# Patient Record
Sex: MALE | Race: WHITE | Employment: OTHER | URBAN - METROPOLITAN AREA
[De-identification: names, ages, dates, MRNs, and addresses within clinical notes are randomized per-mention and may not be internally consistent; named-entity substitution may affect disease eponyms.]

---

## 2017-11-06 ENCOUNTER — HOSPITAL ENCOUNTER (EMERGENCY)
Age: 67
Discharge: HOME | End: 2017-11-06
Attending: EMERGENCY MEDICINE
Payer: MEDICARE

## 2017-11-06 ENCOUNTER — APPOINTMENT (OUTPATIENT)
Dept: GENERAL RADIOLOGY | Age: 67
End: 2017-11-06
Attending: EMERGENCY MEDICINE
Payer: MEDICARE

## 2017-11-06 VITALS
OXYGEN SATURATION: 99 % | SYSTOLIC BLOOD PRESSURE: 155 MMHG | HEART RATE: 70 BPM | RESPIRATION RATE: 12 BRPM | BODY MASS INDEX: 22.19 KG/M2 | DIASTOLIC BLOOD PRESSURE: 63 MMHG | TEMPERATURE: 98.4 F | WEIGHT: 155 LBS | HEIGHT: 70 IN

## 2017-11-06 DIAGNOSIS — M79.605 LEG PAIN, LEFT: ICD-10-CM

## 2017-11-06 DIAGNOSIS — R53.1 LEFT-SIDED WEAKNESS: ICD-10-CM

## 2017-11-06 DIAGNOSIS — R07.9 CHEST PAIN, UNSPECIFIED TYPE: Primary | ICD-10-CM

## 2017-11-06 LAB
ALBUMIN SERPL-MCNC: 2.6 G/DL (ref 3.4–5)
ALBUMIN/GLOB SERPL: 0.8 {RATIO} (ref 0.8–1.7)
ALP SERPL-CCNC: 74 U/L (ref 45–117)
ALT SERPL-CCNC: 11 U/L (ref 16–61)
ANION GAP SERPL CALC-SCNC: 8 MMOL/L (ref 3–18)
AST SERPL-CCNC: 9 U/L (ref 15–37)
ATRIAL RATE: 74 BPM
BASOPHILS # BLD: 0 K/UL (ref 0–0.06)
BASOPHILS NFR BLD: 0 % (ref 0–2)
BILIRUB SERPL-MCNC: 0.3 MG/DL (ref 0.2–1)
BNP SERPL-MCNC: 5383 PG/ML (ref 0–900)
BUN SERPL-MCNC: 28 MG/DL (ref 7–18)
BUN/CREAT SERPL: 17 (ref 12–20)
CALCIUM SERPL-MCNC: 7.8 MG/DL (ref 8.5–10.1)
CALCULATED P AXIS, ECG09: 107 DEGREES
CALCULATED R AXIS, ECG10: 31 DEGREES
CALCULATED T AXIS, ECG11: 52 DEGREES
CHLORIDE SERPL-SCNC: 108 MMOL/L (ref 100–108)
CK MB CFR SERPL CALC: NORMAL % (ref 0–4)
CK MB SERPL-MCNC: <1 NG/ML (ref 5–25)
CK SERPL-CCNC: 47 U/L (ref 39–308)
CO2 SERPL-SCNC: 26 MMOL/L (ref 21–32)
CREAT SERPL-MCNC: 1.62 MG/DL (ref 0.6–1.3)
DIAGNOSIS, 93000: NORMAL
DIFFERENTIAL METHOD BLD: ABNORMAL
EOSINOPHIL # BLD: 0.2 K/UL (ref 0–0.4)
EOSINOPHIL NFR BLD: 1 % (ref 0–5)
ERYTHROCYTE [DISTWIDTH] IN BLOOD BY AUTOMATED COUNT: 14.1 % (ref 11.6–14.5)
GLOBULIN SER CALC-MCNC: 3.2 G/DL (ref 2–4)
GLUCOSE SERPL-MCNC: 145 MG/DL (ref 74–99)
HCT VFR BLD AUTO: 28.9 % (ref 36–48)
HGB BLD-MCNC: 9.5 G/DL (ref 13–16)
LYMPHOCYTES # BLD: 1.6 K/UL (ref 0.9–3.6)
LYMPHOCYTES NFR BLD: 10 % (ref 21–52)
MCH RBC QN AUTO: 29.8 PG (ref 24–34)
MCHC RBC AUTO-ENTMCNC: 32.9 G/DL (ref 31–37)
MCV RBC AUTO: 90.6 FL (ref 74–97)
MONOCYTES # BLD: 0.8 K/UL (ref 0.05–1.2)
MONOCYTES NFR BLD: 5 % (ref 3–10)
NEUTS SEG # BLD: 12.6 K/UL (ref 1.8–8)
NEUTS SEG NFR BLD: 84 % (ref 40–73)
P-R INTERVAL, ECG05: 154 MS
PLATELET # BLD AUTO: 316 K/UL (ref 135–420)
PMV BLD AUTO: 9.2 FL (ref 9.2–11.8)
POTASSIUM SERPL-SCNC: 4.3 MMOL/L (ref 3.5–5.5)
PROT SERPL-MCNC: 5.8 G/DL (ref 6.4–8.2)
Q-T INTERVAL, ECG07: 410 MS
QRS DURATION, ECG06: 84 MS
QTC CALCULATION (BEZET), ECG08: 455 MS
RBC # BLD AUTO: 3.19 M/UL (ref 4.7–5.5)
SODIUM SERPL-SCNC: 142 MMOL/L (ref 136–145)
TROPONIN I BLD-MCNC: 0.07 NG/ML (ref 0–0.08)
TROPONIN I SERPL-MCNC: 0.02 NG/ML (ref 0–0.04)
VENTRICULAR RATE, ECG03: 74 BPM
WBC # BLD AUTO: 15.1 K/UL (ref 4.6–13.2)

## 2017-11-06 PROCEDURE — 80053 COMPREHEN METABOLIC PANEL: CPT | Performed by: EMERGENCY MEDICINE

## 2017-11-06 PROCEDURE — 96374 THER/PROPH/DIAG INJ IV PUSH: CPT

## 2017-11-06 PROCEDURE — 93005 ELECTROCARDIOGRAM TRACING: CPT

## 2017-11-06 PROCEDURE — 74011250636 HC RX REV CODE- 250/636: Performed by: EMERGENCY MEDICINE

## 2017-11-06 PROCEDURE — 71010 XR CHEST PORT: CPT

## 2017-11-06 PROCEDURE — 85025 COMPLETE CBC W/AUTO DIFF WBC: CPT | Performed by: EMERGENCY MEDICINE

## 2017-11-06 PROCEDURE — 84484 ASSAY OF TROPONIN QUANT: CPT | Performed by: EMERGENCY MEDICINE

## 2017-11-06 PROCEDURE — 82550 ASSAY OF CK (CPK): CPT | Performed by: EMERGENCY MEDICINE

## 2017-11-06 PROCEDURE — 83880 ASSAY OF NATRIURETIC PEPTIDE: CPT | Performed by: EMERGENCY MEDICINE

## 2017-11-06 PROCEDURE — 99285 EMERGENCY DEPT VISIT HI MDM: CPT

## 2017-11-06 RX ORDER — FLUTICASONE FUROATE AND VILANTEROL 200; 25 UG/1; UG/1
1 POWDER RESPIRATORY (INHALATION) DAILY
COMMUNITY

## 2017-11-06 RX ORDER — AMMONIUM LACTATE 12 G/100G
LOTION TOPICAL AS NEEDED
COMMUNITY

## 2017-11-06 RX ORDER — NALOXONE HYDROCHLORIDE 4 MG/.1ML
1 SPRAY NASAL
COMMUNITY

## 2017-11-06 RX ORDER — FENTANYL 12.5 UG/1
1 PATCH TRANSDERMAL
COMMUNITY
End: 2017-11-27

## 2017-11-06 RX ORDER — ADHESIVE BANDAGE
30 BANDAGE TOPICAL DAILY PRN
COMMUNITY

## 2017-11-06 RX ORDER — INSULIN ASPART 100 [IU]/ML
INJECTION, SUSPENSION SUBCUTANEOUS
COMMUNITY

## 2017-11-06 RX ORDER — NYSTATIN 100000 U/G
OINTMENT TOPICAL 2 TIMES DAILY
COMMUNITY

## 2017-11-06 RX ORDER — NITROGLYCERIN 0.4 MG/1
0.4 TABLET SUBLINGUAL
COMMUNITY

## 2017-11-06 RX ORDER — CLOPIDOGREL BISULFATE 75 MG/1
75 TABLET ORAL
COMMUNITY

## 2017-11-06 RX ORDER — GUAIFENESIN 600 MG/1
600 TABLET, EXTENDED RELEASE ORAL 2 TIMES DAILY
COMMUNITY

## 2017-11-06 RX ORDER — ATORVASTATIN CALCIUM 80 MG/1
80 TABLET, FILM COATED ORAL DAILY
COMMUNITY

## 2017-11-06 RX ORDER — SENNOSIDES 8.6 MG/1
1 TABLET ORAL DAILY
COMMUNITY

## 2017-11-06 RX ORDER — GABAPENTIN 400 MG/1
400 CAPSULE ORAL 3 TIMES DAILY
COMMUNITY

## 2017-11-06 RX ORDER — HYDROMORPHONE HYDROCHLORIDE 1 MG/ML
1 INJECTION, SOLUTION INTRAMUSCULAR; INTRAVENOUS; SUBCUTANEOUS ONCE
Status: COMPLETED | OUTPATIENT
Start: 2017-11-06 | End: 2017-11-06

## 2017-11-06 RX ORDER — ESCITALOPRAM OXALATE 10 MG/1
10 TABLET ORAL DAILY
COMMUNITY

## 2017-11-06 RX ORDER — AMLODIPINE BESYLATE 10 MG/1
10 TABLET ORAL DAILY
COMMUNITY

## 2017-11-06 RX ORDER — FINASTERIDE 5 MG/1
5 TABLET, FILM COATED ORAL DAILY
COMMUNITY

## 2017-11-06 RX ORDER — BACITRACIN ZINC 500 UNIT/G
OINTMENT (GRAM) TOPICAL 2 TIMES DAILY
COMMUNITY

## 2017-11-06 RX ORDER — LANOLIN ALCOHOL/MO/W.PET/CERES
3 CREAM (GRAM) TOPICAL
COMMUNITY

## 2017-11-06 RX ORDER — OXYCODONE HYDROCHLORIDE 10 MG/1
10 TABLET ORAL
Status: ON HOLD | COMMUNITY
End: 2017-11-22

## 2017-11-06 RX ORDER — SODIUM BICARBONATE 650 MG/1
650 TABLET ORAL 4 TIMES DAILY
COMMUNITY

## 2017-11-06 RX ORDER — GLUCOSAMINE SULFATE 1500 MG
POWDER IN PACKET (EA) ORAL DAILY
COMMUNITY

## 2017-11-06 RX ORDER — TRAZODONE HYDROCHLORIDE 50 MG/1
50 TABLET ORAL
COMMUNITY

## 2017-11-06 RX ORDER — ONDANSETRON 8 MG/1
8 TABLET, ORALLY DISINTEGRATING ORAL
COMMUNITY

## 2017-11-06 RX ADMIN — HYDROMORPHONE HYDROCHLORIDE 1 MG: 1 INJECTION, SOLUTION INTRAMUSCULAR; INTRAVENOUS; SUBCUTANEOUS at 08:35

## 2017-11-06 NOTE — DISCHARGE INSTRUCTIONS
Chest Pain: Care Instructions  Your Care Instructions    There are many things that can cause chest pain. Some are not serious and will get better on their own in a few days. But some kinds of chest pain need more testing and treatment. Your doctor may have recommended a follow-up visit in the next 8 to 12 hours. If you are not getting better, you may need more tests or treatment. Even though your doctor has released you, you still need to watch for any problems. The doctor carefully checked you, but sometimes problems can develop later. If you have new symptoms or if your symptoms do not get better, get medical care right away. If you have worse or different chest pain or pressure that lasts more than 5 minutes or you passed out (lost consciousness), call 911 or seek other emergency help right away. A medical visit is only one step in your treatment. Even if you feel better, you still need to do what your doctor recommends, such as going to all suggested follow-up appointments and taking medicines exactly as directed. This will help you recover and help prevent future problems. How can you care for yourself at home? · Rest until you feel better. · Take your medicine exactly as prescribed. Call your doctor if you think you are having a problem with your medicine. · Do not drive after taking a prescription pain medicine. When should you call for help? Call 911 if:  ? · You passed out (lost consciousness). ? · You have severe difficulty breathing. ? · You have symptoms of a heart attack. These may include:  ¨ Chest pain or pressure, or a strange feeling in your chest.  ¨ Sweating. ¨ Shortness of breath. ¨ Nausea or vomiting. ¨ Pain, pressure, or a strange feeling in your back, neck, jaw, or upper belly or in one or both shoulders or arms. ¨ Lightheadedness or sudden weakness. ¨ A fast or irregular heartbeat.   After you call 911, the  may tell you to chew 1 adult-strength or 2 to 4 low-dose aspirin. Wait for an ambulance. Do not try to drive yourself. ?Call your doctor today if:  ? · You have any trouble breathing. ? · Your chest pain gets worse. ? · You are dizzy or lightheaded, or you feel like you may faint. ? · You are not getting better as expected. ? · You are having new or different chest pain. Where can you learn more? Go to http://lani-caleb.info/. Enter A120 in the search box to learn more about \"Chest Pain: Care Instructions. \"  Current as of: March 20, 2017  Content Version: 11.4  © 1648-8677 [employer]. Care instructions adapted un       Fatigue: Care Instructions  Your Care Instructions    Fatigue is a feeling of tiredness, exhaustion, or lack of energy. You may feel fatigue because of too much or not enough activity. It can also come from stress, lack of sleep, boredom, and poor diet. Many medical problems, such as viral infections, can cause fatigue. Emotional problems, especially depression, are often the cause of fatigue. Fatigue is most often a symptom of another problem. Treatment for fatigue depends on the cause. For example, if you have fatigue because you have a certain health problem, treating this problem also treats your fatigue. If depression or anxiety is the cause, treatment may help. Follow-up care is a key part of your treatment and safety. Be sure to make and go to all appointments, and call your doctor if you are having problems. It's also a good idea to know your test results and keep a list of the medicines you take. How can you care for yourself at home? · Get regular exercise. But don't overdo it. Go back and forth between rest and exercise. · Get plenty of rest.  · Eat a healthy diet. Do not skip meals, especially breakfast.  · Reduce your use of caffeine, tobacco, and alcohol. Caffeine is most often found in coffee, tea, cola drinks, and chocolate. · Limit medicines that can cause fatigue.  This includes tranquilizers and cold and allergy medicines. When should you call for help? Watch closely for changes in your health, and be sure to contact your doctor if:  ? · You have new symptoms such as fever or a rash. ? · Your fatigue gets worse. ? · You have been feeling down, depressed, or hopeless. Or you may have lost interest in things that you usually enjoy. ? · You are not getting better as expected. Where can you learn more? Go to http://lani-caleb.info/. Enter J855 in the search box to learn more about \"Fatigue: Care Instructions. \"  Current as of: March 20, 2017  Content Version: 11.4  © 8658-2653 [employer]. Care instructions adapted under license by [employer] (which disclaims liability or warranty for this information). If you have questions about a medical condition or this instruction, always ask your healthcare professional. [place] [address] any warranty or liability for your use of this information. Weakness: Care Instructions  Your Care Instructions    Weakness is a lack of physical or muscle strength. You may feel that you need to make extra effort to move your arms, legs, or other muscles. Generalized weakness means that you feel weak in most areas of your body. Another type of weakness may affect just one muscle or group of muscles. You may feel weak and tired after you have done too much activity, such as taking an extra-long hike. This is not a serious problem. It often goes away on its own. Feeling weak can also be caused by medical conditions like thyroid problems, depression, or a virus. Sometimes the cause can be serious. Your doctor may want to do more tests to try to find the cause of the weakness. The doctor has checked you carefully, but problems can develop later. If you notice any problems or new symptoms, get medical treatment right away.   Follow-up care is a key part of your treatment and safety. Be sure to make and go to all appointments, and call your doctor if you are having problems. It's also a good idea to know your test results and keep a list of the medicines you take. How can you care for yourself at home? · Rest when you feel tired. · Be safe with medicines. If your doctor prescribed medicine, take it exactly as prescribed. Call your doctor if you think you are having a problem with your medicine. You will get more details on the specific medicines your doctor prescribes. · Do not skip meals. Eating a balanced diet may increase your energy level. · Get some physical activity every day, but do not get too tired. When should you call for help? Call your doctor now or seek immediate medical care if:  ? · You have new or worse weakness. ? · You are dizzy or lightheaded, or you feel like you may faint. ? Watch closely for changes in your health, and be sure to contact your doctor if:  ? · You do not get better as expected. Where can you learn more? Go to http://lani-caleb.info/. Enter 633 [id number] [id number] in the search box to learn more about \"Weakness: Care Instructions. \"  Current as of: March 20, 2017  Content Version: 11.4  © 4307-6435 [employer], [employer]. Care instructions adapted under license by [employer] (which disclaims liability or warranty for this information). If you have questions about a medical condition or this instruction, always ask your healthcare professional. [address] any warranty or liability for your use of this information. huey license by [address] [employer] (which disclaims liability or warranty for this information). If you have questions about a medical condition or this instruction, always ask your healthcare professional. [address] any warranty or liability for your use of this information.

## 2017-11-06 NOTE — ED NOTES
Patient requesting to be taken outside to sit in the sun, explained to the patient that as he arrived for chest pain, I cannot take him off the monitor and have hime sit outside, patient refusing to have any further tests, patient wants to be taken home. Provider in the room to explain the tests to the patient. Katie Davis at [facility] was called to inform her of patient status.

## 2017-11-06 NOTE — ED NOTES
Patient cleaned and a new Depends placed on the patient (patient requested change as his Depends was wet), patient given a pillow and warm blanket

## 2017-11-06 NOTE — ED NOTES
Patient cleaned of stool and urine, placed into a wheel chair per patient's request. Placed socks on patient per request.

## 2017-11-06 NOTE — ED NOTES
Patient was discharged roughly two hours ago by another nurse. No one charted time. This nurse discharged patient from system to clear board.

## 2017-11-06 NOTE — ED PROVIDER NOTES
HPI Comments: 8:33 AM  Nicole Leung is a 78 y.o. male with a h/o MI, HTN, DM, CHF, CAD, and stroke presents to ED, via EMS, c/o left-sided CP onset 2 weeks ago. Pain is 9/10 in intensity, intermittent, radiates to left shoulder, and lasts for 3 minutes each episode. Associated symptoms include SOB. Patient reports that he has a history of seven MI, DVT, PE, and a stroke 1 year ago. He states that the stroke caused left-sided weakness and chronic left leg pain since 1 year ago. Patient states that he was recently admitted to [facility] but did not have an angiogram or cardiac catheterization done, leaving before having a complete workup. He notes that he is usually taken care of at a nursing home. Patient's exertional symptoms are unclear due to him being wheelchair bound. He states that he takes blood thinner Coumadin and would like pain medication for his chronic left leg, which he states that the pain medications at the nursing home does not improve this pain. Patient denies any other symptoms or complaints. PCP: Ashlee Kelley MD      The history is provided by the patient. No  was used. Past Medical History:   Diagnosis Date    CAD (coronary artery disease)     [facility]     Diabetes ([facility]     Hypertension     Ill-defined condition     hip pain/infection    MI, old     Stroke (cerebrum) ([facility]        Past Surgical History:   Procedure Laterality Date    HX APPENDECTOMY  13         Family History:   Problem Relation Age of Onset    Cancer Father      Pancreatic       Social History     Social History    Marital status: SINGLE     Spouse name: N/A    Number of children: N/A    Years of education: N/A     Occupational History    Not on file.      Social History Main Topics    Smoking status: Current Every Day Smoker     Packs/day: 1.00    Smokeless tobacco: Not on file    Alcohol use 3.6 oz/week     6 Cans of beer per week    Drug use: Not on file    Sexual activity: Not on file     Other Topics Concern    Not on file     Social History Narrative         ALLERGIES: Biaxin [clarithromycin]    Review of Systems   Constitutional: Negative for activity change, fatigue and fever. HENT: Negative for congestion and rhinorrhea. Eyes: Negative for visual disturbance. Respiratory: Positive for shortness of breath. Cardiovascular: Positive for chest pain (left sided). Negative for palpitations. Gastrointestinal: Negative for abdominal pain, diarrhea, nausea and vomiting. Genitourinary: Negative for dysuria and hematuria. Musculoskeletal: Negative for back pain. Hip pain. Skin: Negative for rash. Neurological: Positive for weakness (left sude). Negative for dizziness and light-headedness. Psychiatric/Behavioral: Negative for agitation. All other systems reviewed and are negative. Vitals:    11/06/17 1150 11/06/17 1200 11/06/17 1230 11/06/17 1240   BP: 151/66 137/57 137/73 155/63   Pulse: 68 70 71 70   Resp: 13 17 16 12   Temp:       SpO2: 98% 99%     Weight:       Height:                Physical Exam   Constitutional: He appears well-developed and well-nourished. HENT:   Head: Normocephalic and atraumatic. Eyes: Conjunctivae are normal. Pupils are equal, round, and reactive to light. Neck: Normal range of motion. Neck supple. Cardiovascular: Normal rate and regular rhythm. Pulmonary/Chest: Effort normal and breath sounds normal.   Abdominal: Soft. Bowel sounds are normal.   Right to midline abdomen hernia appreciated, reducible and non-tender. Musculoskeletal: Normal range of motion. Lymphadenopathy:     He has no cervical adenopathy. Neurological: He is alert. Skin: Skin is warm. No inguinal or perineal rash. Right foot dorsum stage 3 ulcer with mild erythema. Large eschar to the right heel. Psychiatric: He has a normal mood and affect.         MDM  Number of Diagnoses or Management Options  Diagnosis management comments: 78 y.o. Male presents to ED with CP. He has a history of peripheral vascular disease, stroke, and recent admission to [facility] with an incomplete workup. Patient has left leg pain from stroke, a history of DVT and PE, and right foot ulcer. I will rule out CAD and do a chest x-ray for pneumonia. Patient states that chronic pain is the main reason he is here. He would like pain medication for his leg.     ED Course       Procedures    Vitals:  Patient Vitals for the past 12 hrs:   Temp Pulse Resp BP SpO2   11/06/17 1240 - 70 12 155/63 -   11/06/17 1230 - 71 16 137/73 -   11/06/17 1200 - 70 17 137/57 99 %   11/06/17 1150 - 68 13 151/66 98 %   11/06/17 1140 - 72 15 (!) 118/94 -   11/06/17 1130 - 67 15 177/65 -   11/06/17 1120 - 66 14 157/70 -   11/06/17 1110 - 68 17 134/56 99 %   11/06/17 1100 - 64 14 166/62 100 %   11/06/17 1050 - 64 11 173/61 99 %   11/06/17 1040 - 65 9 159/62 100 %   11/06/17 1030 - 65 13 172/62 99 %   11/06/17 1020 - 65 13 167/59 100 %   11/06/17 1010 - 66 14 168/59 99 %   11/06/17 1000 - 64 13 161/57 99 %   11/06/17 0950 - 65 14 159/57 98 %   11/06/17 0930 - 65 13 166/57 100 %   11/06/17 0900 - 66 18 137/59 100 %   11/06/17 0830 - 69 17 159/61 100 %   11/06/17 0800 - 74 21 161/61 99 %   11/06/17 0750 98.4 °F (36.9 °C) 69 17 159/61 -       Medications Ordered:  Medications   HYDROmorphone (PF) (DILAUDID) injection 1 mg (1 mg IntraVENous Given 11/6/17 0835)       Lab Findings:  Recent Results (from the past 12 hour(s))   EKG, 12 LEAD, INITIAL    Collection Time: 11/06/17  7:38 AM   Result Value Ref Range    Ventricular Rate 74 BPM    Atrial Rate 74 BPM    P-R Interval 154 ms    QRS Duration 84 ms    Q-T Interval 410 ms    QTC Calculation (Bezet) 455 ms    Calculated P Axis 107 degrees    Calculated R Axis 31 degrees    Calculated T Axis 52 degrees    Diagnosis       Sinus rhythm with premature atrial complexes  Otherwise normal ECG  When compared with ECG of 14-MAY-2016 09:00,  premature atrial complexes are now present  Confirmed by Jenny Reich ([id number]) on 11/6/2017 9:07:24 AM     CBC WITH AUTOMATED DIFF    Collection Time: 11/06/17  8:00 AM   Result Value Ref Range    WBC 15.1 (H) 4.6 - 13.2 K/uL    RBC 3.19 (L) 4.70 - 5.50 M/uL    HGB 9.5 (L) 13.0 - 16.0 g/dL    HCT 28.9 (L) 36.0 - 48.0 %    MCV 90.6 74.0 - 97.0 FL    MCH 29.8 24.0 - 34.0 PG    MCHC 32.9 31.0 - 37.0 g/dL    RDW 14.1 11.6 - 14.5 %    PLATELET 837 303 - 674 K/uL    MPV 9.2 9.2 - 11.8 FL    NEUTROPHILS 84 (H) 40 - 73 %    LYMPHOCYTES 10 (L) 21 - 52 %    MONOCYTES 5 3 - 10 %    EOSINOPHILS 1 0 - 5 %    BASOPHILS 0 0 - 2 %    ABS. NEUTROPHILS 12.6 (H) 1.8 - 8.0 K/UL    ABS. LYMPHOCYTES 1.6 0.9 - 3.6 K/UL    ABS. MONOCYTES 0.8 0.05 - 1.2 K/UL    ABS. EOSINOPHILS 0.2 0.0 - 0.4 K/UL    ABS. BASOPHILS 0.0 0.0 - 0.06 K/UL    DF AUTOMATED     METABOLIC PANEL, COMPREHENSIVE    Collection Time: 11/06/17  8:00 AM   Result Value Ref Range    Sodium 142 136 - 145 mmol/L    Potassium 4.3 3.5 - 5.5 mmol/L    Chloride 108 100 - 108 mmol/L    CO2 26 21 - 32 mmol/L    Anion gap 8 3.0 - 18 mmol/L    Glucose 145 (H) 74 - 99 mg/dL    BUN 28 (H) 7.0 - 18 MG/DL    Creatinine 1.62 (H) 0.6 - 1.3 MG/DL    BUN/Creatinine ratio 17 12 - 20      GFR est AA 52 (L) >60 ml/min/1.73m2    GFR est non-AA 43 (L) >60 ml/min/1.73m2    Calcium 7.8 (L) 8.5 - 10.1 MG/DL    Bilirubin, total 0.3 0.2 - 1.0 MG/DL    ALT (SGPT) 11 (L) 16 - 61 U/L    AST (SGOT) 9 (L) 15 - 37 U/L    Alk.  phosphatase 74 45 - 117 U/L    Protein, total 5.8 (L) 6.4 - 8.2 g/dL    Albumin 2.6 (L) 3.4 - 5.0 g/dL    Globulin 3.2 2.0 - 4.0 g/dL    A-G Ratio 0.8 0.8 - 1.7     CARDIAC PANEL,(CK, CKMB & TROPONIN)    Collection Time: 11/06/17  8:00 AM   Result Value Ref Range    CK 47 39 - 308 U/L    CK - MB <1.0 <3.6 ng/ml    CK-MB Index  0.0 - 4.0 %     CALCULATION NOT PERFORMED WHEN RESULT IS BELOW LINEAR LIMIT    Troponin-I, Qt. 0.02 0.0 - 0.045 NG/ML   NT-PRO BNP    Collection Time: 11/06/17  8:00 AM   Result Value Ref Range    NT pro-BNP 5383 (H) 0 - 900 PG/ML   POC TROPONIN-I    Collection Time: 11/06/17 11:59 AM   Result Value Ref Range    Troponin-I (POC) 0.07 0.00 - 0.08 ng/mL       EKG Interpretation by ED physician:  7:38 AM  NSR, 74, normal intervals, and normal axis. No ischemic changes. X-ray, CT or radiology findings or impressions:  XR CHEST PORT   Final Result      Xr Chest Port    Result Date: 11/6/2017  Chest, single view Indication: Coronary artery disease, left-sided chest pain. Comparison: Chest radiograph May 14, 2016 Findings:  Portable upright AP view of the chest was obtained. There is no focal pneumonic opacity, pneumothorax, or pleural effusion. Cardiac size and mediastinal contours are stable. Atherosclerotic calcification of the thoracic aorta noted. No acute osseous abnormality. Impression: No radiographic evidence of an acute abnormality. Stable exam.        Progress notes, consult notes, or additional procedure notes:  8:48 AM  Patient's white count is 15,000 and hemoglobin is 9. Creatine is 1.62. These labs are abnormal and worsening compared to May of 2016.    8:50 AM  Compared to [facility] labs 3 days ago, patient's hemoglobin is 9.2. There was a request to get an aortogram but patient was unable to get that done and left before treatment was completed. 1:04 PM  Patient's case was discussed with Dr. Marry EMERSON who states patient has chronic leg pain and body pain. I did ask that patient has multiple stents and CAD hx, and she was unaware of this pathology, and I have asked them to follow up on his care. At 1300 hour patient stated that he is ready to go home. I did tell him that his 2nd tropinin was a different number because it was PO testing. Patient does not want to stay for repeat testing to make sure it was not abnormal. I am honoroing patients wishes. He is awake, alert, and oriented.  Patient will follow up in the clinic. Reevaluation of the patient:      Diagnosis:   1. Chest pain, unspecified type    2. Leg pain, left    3. Left-sided weakness        Disposition: DC home with followup / patient was done and did not want any more testing done. He was awake. alert. No distress and wanted to go out and sit in the sun. Follow-up Information     Follow up With Details Comments Contact Info    Zeeshan Lagunas MD Call today Follow Up From Emergency Department [address] [facility] EMERGENCY DEPT  If symptoms worsen [address] [facility] [address].  [phone]           Patient's Medications   Start Taking    No medications on file   Continue Taking    ACETAMINOPHEN (TYLENOL) 325 MG TABLET    Take  by mouth every four (4) hours as needed for Pain. AMLODIPINE (NORVASC) 10 MG TABLET    Take 10 mg by mouth daily. AMMONIUM LACTATE (LAC-HYDRIN) 12 % LOTION    Apply  to affected area as needed. rub in to affected area well    APIXABAN (ELIQUIS) 5 MG TABLET    Take 2.5 mg by mouth two (2) times a day. ASPIRIN DELAYED-RELEASE 81 MG TABLET    Take 1 Tab by mouth daily. ATORVASTATIN (LIPITOR) 80 MG TABLET    Take 80 mg by mouth daily. BACITRACIN ZINC (BACITRACIN) OINTMENT    Apply  to affected area two (2) times a day. CHOLECALCIFEROL (VITAMIN D3) 1,000 UNIT CAP    Take  by mouth daily. CLOPIDOGREL (PLAVIX) 75 MG TAB    Take 75 mg by mouth. DIPHENHYDRAMINE-ZINC ACETATE 2%-0.1% (BENADRYL EXTRA STRENGTH) 2-0.1 % TOPICAL CREAM    Apply  to affected area two (2) times daily as needed for Itching. DULOXETINE (CYMBALTA) 60 MG CAPSULE    Take 60 mg by mouth daily. ESCITALOPRAM OXALATE (LEXAPRO) 10 MG TABLET    Take 10 mg by mouth daily. FENTANYL (DURAGESIC) 12 MCG/HR PATCH    1 Patch by TransDERmal route every seventy-two (72) hours. FINASTERIDE (PROSCAR) 5 MG TABLET    Take 5 mg by mouth daily.     FLUTICASONE-VILANTEROL (BREO ELLIPTA) 200-25 MCG/DOSE INHALER    Take 1 Puff by inhalation daily. FUROSEMIDE (LASIX) 20 MG TABLET    Take  by mouth daily. GABAPENTIN (NEURONTIN) 400 MG CAPSULE    Take 400 mg by mouth three (3) times daily. GUAIFENESIN ER (MUCINEX) 600 MG ER TABLET    Take 600 mg by mouth two (2) times a day. INSULIN ASPART PROTAMINE/INSULIN ASPART (NOVOLOG MIX 70-30 FLEXPEN) 100 UNIT/ML (70-30) INPN    by SubCUTAneous route. INSULIN GLARGINE (LANTUS) 100 UNIT/ML INJECTION    by SubCUTAneous route nightly. Per scale    INSULIN LISPRO (HUMALOG) 100 UNIT/ML INJECTION    by SubCUTAneous route once. INSULIN NPH/INSULIN REGULAR (NOVOLIN 70/30) 100 UNIT/ML (70-30) INJECTION    10 Units by SubCUTAneous route two (2) times a day. IPRATROPIUM-ALBUTEROL (COMBIVENT RESPIMAT)  MCG/ACTUATION INHALER    Take 1 Puff by inhalation two (2) times a day. LINAGLIPTIN (TRADJENTA) 5 MG TABLET    Take 5 mg by mouth daily. LOSARTAN (COZAAR) 100 MG TABLET    Take 100 mg by mouth daily. MAGNESIUM HYDROXIDE (iPAYst MILK OF MAGNESIA) 400 MG/5 ML SUSPENSION    Take 30 mL by mouth daily as needed for Constipation. MELATONIN 3 MG TABLET    Take 3 mg by mouth. MIRABEGRON ER (MYRBETRIQ) 25 MG ER TABLET    Take 1 Tab by mouth daily. NALOXONE (NARCAN) 4 MG/ACTUATION NASAL SPRAY    1 [place] by IntraNASal route once as needed. Use 1 spray intranasally into 1 nostril. Use a new Narcan nasal spray for subsequent doses and administer into alternating nostrils. May repeat every 2 to 3 minutes as needed. NITROGLYCERIN (NITROSTAT) 0.4 MG SL TABLET    0.4 mg by SubLINGual route every five (5) minutes as needed for Chest Pain. NYSTATIN (MYCOSTATIN) 100,000 UNIT/GRAM OINTMENT    Apply  to affected area two (2) times a day. OMEGA-3 FATTY ACIDS/FISH OIL (THERAGRAN-M PO)    Take  by mouth. ONDANSETRON (ZOFRAN ODT) 8 MG DISINTEGRATING TABLET    Take 8 mg by mouth every eight (8) hours as needed for Nausea.     OXYCODONE IR (Stana Luz Elena) 10 MG TAB IMMEDIATE RELEASE TABLET    Take 10 mg by mouth. OXYMETAZOLINE (AFRIN, OXYMETAZOLINE,) 0.05 % NASAL SPRAY    2 Sprays two (2) times a day. SENNA (SENNA) 8.6 MG TABLET    Take 1 Tab by mouth daily. SODIUM BICARBONATE 650 MG TABLET    Take 650 mg by mouth four (4) times daily. TAMSULOSIN (FLOMAX) 0.4 MG CAPSULE    Take 0.4 mg by mouth daily. TIOTROPIUM (SPIRIVA) 18 MCG INHALATION CAPSULE    Take 1 Cap by inhalation daily. TRAZODONE (DESYREL) 50 MG TABLET    Take 50 mg by mouth nightly. ZINC OXIDE (SECURA PROTECTIVE, ZINC OXIDE,) 10 % TOPICAL CREAM    Apply  to affected area. These Medications have changed    No medications on file   Stop Taking    No medications on file       Scribe Attestation      Talisha Benavides acting as a scribe for and in the presence of Leeann Foster MD  November 06, 2017 at 8:37 AM       Provider Attestation:      I personally performed the services described in the documentation, reviewed the documentation, as recorded by the scribe in my presence, and it accurately and completely records my words and actions.  November 06, 2017 at 8:37 AM - Leeann Foster MD

## 2017-11-17 ENCOUNTER — HOSPITAL ENCOUNTER (INPATIENT)
Age: 67
LOS: 10 days | Discharge: NURSING, REHAB OR LONG-TERM CARE | DRG: 270 | End: 2017-11-27
Attending: EMERGENCY MEDICINE | Admitting: HOSPITALIST
Payer: MEDICARE

## 2017-11-17 ENCOUNTER — APPOINTMENT (OUTPATIENT)
Dept: GENERAL RADIOLOGY | Age: 67
DRG: 270 | End: 2017-11-17
Attending: PHYSICIAN ASSISTANT
Payer: MEDICARE

## 2017-11-17 DIAGNOSIS — L89.894: Primary | ICD-10-CM

## 2017-11-17 DIAGNOSIS — E86.0 DEHYDRATION: ICD-10-CM

## 2017-11-17 DIAGNOSIS — R77.8 ELEVATED TROPONIN: ICD-10-CM

## 2017-11-17 DIAGNOSIS — N17.9 AKI (ACUTE KIDNEY INJURY) (HCC): ICD-10-CM

## 2017-11-17 PROBLEM — I99.8: Status: ACTIVE | Noted: 2017-11-17

## 2017-11-17 PROBLEM — M79.673: Status: ACTIVE | Noted: 2017-11-17

## 2017-11-17 LAB
ALBUMIN SERPL-MCNC: 2.2 G/DL (ref 3.4–5)
ALBUMIN/GLOB SERPL: 0.4 {RATIO} (ref 0.8–1.7)
ALP SERPL-CCNC: 81 U/L (ref 45–117)
ALT SERPL-CCNC: 11 U/L (ref 16–61)
ANION GAP SERPL CALC-SCNC: 7 MMOL/L (ref 3–18)
APPEARANCE UR: CLEAR
AST SERPL-CCNC: 14 U/L (ref 15–37)
ATRIAL RATE: 86 BPM
BACTERIA URNS QL MICRO: ABNORMAL /HPF
BASOPHILS # BLD: 0 K/UL (ref 0–0.06)
BASOPHILS NFR BLD: 0 % (ref 0–2)
BILIRUB SERPL-MCNC: 0.3 MG/DL (ref 0.2–1)
BILIRUB UR QL: NEGATIVE
BNP SERPL-MCNC: ABNORMAL PG/ML (ref 0–900)
BUN SERPL-MCNC: 44 MG/DL (ref 7–18)
BUN/CREAT SERPL: 18 (ref 12–20)
CALCIUM SERPL-MCNC: 8.6 MG/DL (ref 8.5–10.1)
CALCULATED P AXIS, ECG09: 4 DEGREES
CALCULATED R AXIS, ECG10: 60 DEGREES
CALCULATED T AXIS, ECG11: -47 DEGREES
CHLORIDE SERPL-SCNC: 104 MMOL/L (ref 100–108)
CK MB CFR SERPL CALC: 2.3 % (ref 0–4)
CK MB SERPL-MCNC: 3.8 NG/ML (ref 5–25)
CK SERPL-CCNC: 168 U/L (ref 39–308)
CO2 SERPL-SCNC: 27 MMOL/L (ref 21–32)
COLOR UR: YELLOW
CREAT SERPL-MCNC: 2.4 MG/DL (ref 0.6–1.3)
DIAGNOSIS, 93000: NORMAL
DIFFERENTIAL METHOD BLD: ABNORMAL
EOSINOPHIL # BLD: 0.2 K/UL (ref 0–0.4)
EOSINOPHIL NFR BLD: 1 % (ref 0–5)
EPITH CASTS URNS QL MICRO: ABNORMAL /LPF (ref 0–5)
ERYTHROCYTE [DISTWIDTH] IN BLOOD BY AUTOMATED COUNT: 14.1 % (ref 11.6–14.5)
EST. AVERAGE GLUCOSE BLD GHB EST-MCNC: 120 MG/DL
GLOBULIN SER CALC-MCNC: 4.9 G/DL (ref 2–4)
GLUCOSE SERPL-MCNC: 136 MG/DL (ref 74–99)
GLUCOSE UR STRIP.AUTO-MCNC: 250 MG/DL
HBA1C MFR BLD: 5.8 % (ref 4.2–5.6)
HCT VFR BLD AUTO: 26.7 % (ref 36–48)
HGB BLD-MCNC: 8.6 G/DL (ref 13–16)
HGB UR QL STRIP: NEGATIVE
KETONES UR QL STRIP.AUTO: NEGATIVE MG/DL
LACTATE BLD-SCNC: 1.4 MMOL/L (ref 0.4–2)
LEUKOCYTE ESTERASE UR QL STRIP.AUTO: NEGATIVE
LYMPHOCYTES # BLD: 1.4 K/UL (ref 0.9–3.6)
LYMPHOCYTES NFR BLD: 11 % (ref 21–52)
MCH RBC QN AUTO: 29.5 PG (ref 24–34)
MCHC RBC AUTO-ENTMCNC: 32.2 G/DL (ref 31–37)
MCV RBC AUTO: 91.4 FL (ref 74–97)
MONOCYTES # BLD: 0.6 K/UL (ref 0.05–1.2)
MONOCYTES NFR BLD: 5 % (ref 3–10)
NEUTS SEG # BLD: 10.9 K/UL (ref 1.8–8)
NEUTS SEG NFR BLD: 83 % (ref 40–73)
NITRITE UR QL STRIP.AUTO: NEGATIVE
P-R INTERVAL, ECG05: 140 MS
PH UR STRIP: 5 [PH] (ref 5–8)
PLATELET # BLD AUTO: 332 K/UL (ref 135–420)
PMV BLD AUTO: 9.1 FL (ref 9.2–11.8)
POTASSIUM SERPL-SCNC: 5.4 MMOL/L (ref 3.5–5.5)
PROT SERPL-MCNC: 7.1 G/DL (ref 6.4–8.2)
PROT UR STRIP-MCNC: >1000 MG/DL
Q-T INTERVAL, ECG07: 370 MS
QRS DURATION, ECG06: 82 MS
QTC CALCULATION (BEZET), ECG08: 442 MS
RBC # BLD AUTO: 2.92 M/UL (ref 4.7–5.5)
RBC #/AREA URNS HPF: ABNORMAL /HPF (ref 0–5)
SODIUM SERPL-SCNC: 138 MMOL/L (ref 136–145)
SP GR UR REFRACTOMETRY: 1.02 (ref 1–1.03)
TROPONIN I SERPL-MCNC: 0.94 NG/ML (ref 0–0.04)
TROPONIN I SERPL-MCNC: 1.23 NG/ML (ref 0–0.04)
UROBILINOGEN UR QL STRIP.AUTO: 0.2 EU/DL (ref 0.2–1)
VENTRICULAR RATE, ECG03: 86 BPM
WBC # BLD AUTO: 13 K/UL (ref 4.6–13.2)
WBC URNS QL MICRO: ABNORMAL /HPF (ref 0–4)

## 2017-11-17 PROCEDURE — 77030011256 HC DRSG MEPILEX <16IN NO BORD MOLN -A

## 2017-11-17 PROCEDURE — 83880 ASSAY OF NATRIURETIC PEPTIDE: CPT | Performed by: HOSPITALIST

## 2017-11-17 PROCEDURE — 74011250636 HC RX REV CODE- 250/636: Performed by: EMERGENCY MEDICINE

## 2017-11-17 PROCEDURE — 80053 COMPREHEN METABOLIC PANEL: CPT | Performed by: PHYSICIAN ASSISTANT

## 2017-11-17 PROCEDURE — 74011000258 HC RX REV CODE- 258: Performed by: PHYSICIAN ASSISTANT

## 2017-11-17 PROCEDURE — 96367 TX/PROPH/DG ADDL SEQ IV INF: CPT

## 2017-11-17 PROCEDURE — 74011250636 HC RX REV CODE- 250/636: Performed by: HOSPITALIST

## 2017-11-17 PROCEDURE — 83036 HEMOGLOBIN GLYCOSYLATED A1C: CPT | Performed by: HOSPITALIST

## 2017-11-17 PROCEDURE — 74011250637 HC RX REV CODE- 250/637: Performed by: HOSPITALIST

## 2017-11-17 PROCEDURE — 96368 THER/DIAG CONCURRENT INF: CPT

## 2017-11-17 PROCEDURE — 74011250636 HC RX REV CODE- 250/636: Performed by: PHYSICIAN ASSISTANT

## 2017-11-17 PROCEDURE — 51701 INSERT BLADDER CATHETER: CPT

## 2017-11-17 PROCEDURE — 74011250637 HC RX REV CODE- 250/637: Performed by: PHYSICIAN ASSISTANT

## 2017-11-17 PROCEDURE — 82550 ASSAY OF CK (CPK): CPT | Performed by: HOSPITALIST

## 2017-11-17 PROCEDURE — 93005 ELECTROCARDIOGRAM TRACING: CPT

## 2017-11-17 PROCEDURE — 65270000029 HC RM PRIVATE

## 2017-11-17 PROCEDURE — 94762 N-INVAS EAR/PLS OXIMTRY CONT: CPT

## 2017-11-17 PROCEDURE — 85025 COMPLETE CBC W/AUTO DIFF WBC: CPT | Performed by: PHYSICIAN ASSISTANT

## 2017-11-17 PROCEDURE — 81001 URINALYSIS AUTO W/SCOPE: CPT | Performed by: PHYSICIAN ASSISTANT

## 2017-11-17 PROCEDURE — 71010 XR CHEST PORT: CPT

## 2017-11-17 PROCEDURE — 96365 THER/PROPH/DIAG IV INF INIT: CPT

## 2017-11-17 PROCEDURE — 83605 ASSAY OF LACTIC ACID: CPT

## 2017-11-17 PROCEDURE — 84484 ASSAY OF TROPONIN QUANT: CPT | Performed by: PHYSICIAN ASSISTANT

## 2017-11-17 PROCEDURE — 73630 X-RAY EXAM OF FOOT: CPT

## 2017-11-17 PROCEDURE — 99284 EMERGENCY DEPT VISIT MOD MDM: CPT

## 2017-11-17 PROCEDURE — 96366 THER/PROPH/DIAG IV INF ADDON: CPT

## 2017-11-17 PROCEDURE — 87040 BLOOD CULTURE FOR BACTERIA: CPT | Performed by: PHYSICIAN ASSISTANT

## 2017-11-17 RX ORDER — DULOXETIN HYDROCHLORIDE 60 MG/1
60 CAPSULE, DELAYED RELEASE ORAL DAILY
Status: DISCONTINUED | OUTPATIENT
Start: 2017-11-18 | End: 2017-11-27 | Stop reason: HOSPADM

## 2017-11-17 RX ORDER — ACETAMINOPHEN 325 MG/1
650 TABLET ORAL
Status: DISCONTINUED | OUTPATIENT
Start: 2017-11-17 | End: 2017-11-27 | Stop reason: HOSPADM

## 2017-11-17 RX ORDER — LEVOFLOXACIN 5 MG/ML
750 INJECTION, SOLUTION INTRAVENOUS
Status: DISCONTINUED | OUTPATIENT
Start: 2017-11-17 | End: 2017-11-19 | Stop reason: CLARIF

## 2017-11-17 RX ORDER — SODIUM BICARBONATE 650 MG/1
650 TABLET ORAL 4 TIMES DAILY
Status: DISCONTINUED | OUTPATIENT
Start: 2017-11-17 | End: 2017-11-27 | Stop reason: HOSPADM

## 2017-11-17 RX ORDER — CLOPIDOGREL BISULFATE 75 MG/1
75 TABLET ORAL DAILY
Status: DISCONTINUED | OUTPATIENT
Start: 2017-11-18 | End: 2017-11-19

## 2017-11-17 RX ORDER — AMLODIPINE BESYLATE 10 MG/1
10 TABLET ORAL DAILY
Status: DISCONTINUED | OUTPATIENT
Start: 2017-11-18 | End: 2017-11-27 | Stop reason: HOSPADM

## 2017-11-17 RX ORDER — ADHESIVE BANDAGE
30 BANDAGE TOPICAL DAILY PRN
Status: DISCONTINUED | OUTPATIENT
Start: 2017-11-17 | End: 2017-11-27 | Stop reason: HOSPADM

## 2017-11-17 RX ORDER — ACETAMINOPHEN 500 MG
1000 TABLET ORAL
Status: COMPLETED | OUTPATIENT
Start: 2017-11-17 | End: 2017-11-17

## 2017-11-17 RX ORDER — NITROGLYCERIN 0.4 MG/1
0.4 TABLET SUBLINGUAL
Status: DISCONTINUED | OUTPATIENT
Start: 2017-11-17 | End: 2017-11-27 | Stop reason: HOSPADM

## 2017-11-17 RX ORDER — ONDANSETRON 2 MG/ML
4 INJECTION INTRAMUSCULAR; INTRAVENOUS
Status: DISCONTINUED | OUTPATIENT
Start: 2017-11-17 | End: 2017-11-27 | Stop reason: HOSPADM

## 2017-11-17 RX ORDER — SODIUM CHLORIDE 0.9 % (FLUSH) 0.9 %
5-10 SYRINGE (ML) INJECTION AS NEEDED
Status: DISCONTINUED | OUTPATIENT
Start: 2017-11-17 | End: 2017-11-27 | Stop reason: HOSPADM

## 2017-11-17 RX ORDER — FUROSEMIDE 20 MG/1
20 TABLET ORAL DAILY
Status: DISCONTINUED | OUTPATIENT
Start: 2017-11-18 | End: 2017-11-18

## 2017-11-17 RX ORDER — GABAPENTIN 400 MG/1
400 CAPSULE ORAL 3 TIMES DAILY
Status: DISCONTINUED | OUTPATIENT
Start: 2017-11-17 | End: 2017-11-27 | Stop reason: HOSPADM

## 2017-11-17 RX ORDER — FINASTERIDE 5 MG/1
5 TABLET, FILM COATED ORAL DAILY
Status: DISCONTINUED | OUTPATIENT
Start: 2017-11-18 | End: 2017-11-27 | Stop reason: HOSPADM

## 2017-11-17 RX ORDER — SODIUM CHLORIDE 9 MG/ML
50 INJECTION, SOLUTION INTRAVENOUS CONTINUOUS
Status: DISCONTINUED | OUTPATIENT
Start: 2017-11-17 | End: 2017-11-18

## 2017-11-17 RX ORDER — TAMSULOSIN HYDROCHLORIDE 0.4 MG/1
0.4 CAPSULE ORAL DAILY
Status: DISCONTINUED | OUTPATIENT
Start: 2017-11-18 | End: 2017-11-27 | Stop reason: HOSPADM

## 2017-11-17 RX ORDER — ASPIRIN 81 MG/1
81 TABLET ORAL DAILY
Status: DISCONTINUED | OUTPATIENT
Start: 2017-11-18 | End: 2017-11-27 | Stop reason: HOSPADM

## 2017-11-17 RX ORDER — FENTANYL 12.5 UG/1
1 PATCH TRANSDERMAL
Status: DISCONTINUED | OUTPATIENT
Start: 2017-11-17 | End: 2017-11-26

## 2017-11-17 RX ORDER — LOSARTAN POTASSIUM 50 MG/1
100 TABLET ORAL DAILY
Status: DISCONTINUED | OUTPATIENT
Start: 2017-11-18 | End: 2017-11-27 | Stop reason: HOSPADM

## 2017-11-17 RX ORDER — GUAIFENESIN 600 MG/1
600 TABLET, EXTENDED RELEASE ORAL 2 TIMES DAILY
Status: DISCONTINUED | OUTPATIENT
Start: 2017-11-17 | End: 2017-11-27 | Stop reason: HOSPADM

## 2017-11-17 RX ORDER — ATORVASTATIN CALCIUM 40 MG/1
80 TABLET, FILM COATED ORAL DAILY
Status: DISCONTINUED | OUTPATIENT
Start: 2017-11-18 | End: 2017-11-27 | Stop reason: HOSPADM

## 2017-11-17 RX ORDER — HYDROCODONE BITARTRATE AND ACETAMINOPHEN 5; 325 MG/1; MG/1
1 TABLET ORAL
Status: DISCONTINUED | OUTPATIENT
Start: 2017-11-17 | End: 2017-11-20

## 2017-11-17 RX ADMIN — GABAPENTIN 400 MG: 400 CAPSULE ORAL at 22:10

## 2017-11-17 RX ADMIN — APIXABAN 2.5 MG: 2.5 TABLET, FILM COATED ORAL at 23:02

## 2017-11-17 RX ADMIN — ACETAMINOPHEN 1000 MG: 500 TABLET ORAL at 14:43

## 2017-11-17 RX ADMIN — SODIUM CHLORIDE 50 ML/HR: 900 INJECTION, SOLUTION INTRAVENOUS at 23:07

## 2017-11-17 RX ADMIN — SODIUM CHLORIDE 500 ML: 900 INJECTION, SOLUTION INTRAVENOUS at 13:30

## 2017-11-17 RX ADMIN — ONDANSETRON 4 MG: 2 INJECTION INTRAMUSCULAR; INTRAVENOUS at 22:06

## 2017-11-17 RX ADMIN — SODIUM CHLORIDE 500 ML: 900 INJECTION, SOLUTION INTRAVENOUS at 13:27

## 2017-11-17 RX ADMIN — SODIUM BICARBONATE 650 MG TABLET 650 MG: at 23:03

## 2017-11-17 RX ADMIN — SODIUM CHLORIDE 1750 MG: 900 INJECTION, SOLUTION INTRAVENOUS at 15:31

## 2017-11-17 RX ADMIN — PIPERACILLIN SODIUM,TAZOBACTAM SODIUM 4.5 G: 4; .5 INJECTION, POWDER, FOR SOLUTION INTRAVENOUS at 13:47

## 2017-11-17 RX ADMIN — ACETAMINOPHEN 650 MG: 325 TABLET, FILM COATED ORAL at 22:10

## 2017-11-17 RX ADMIN — GUAIFENESIN 600 MG: 600 TABLET, EXTENDED RELEASE ORAL at 22:10

## 2017-11-17 RX ADMIN — LEVOFLOXACIN 750 MG: 5 INJECTION, SOLUTION INTRAVENOUS at 13:30

## 2017-11-17 NOTE — IP AVS SNAPSHOT
[address] [address] 
[phone] Patient: Zeyad Limon MRN: [id number] My Medications STOP taking these medications   
 fentaNYL 12 mcg/hr patch Commonly known as:  [address] these medications as instructed Instructions Each Dose to Equal  
 Morning Noon Evening Bedtime  
 acetaminophen 325 mg tablet Commonly known as:  TYLENOL Your last dose was: Your next dose is: Take  by mouth every four (4) hours as needed for Pain. AFRIN (OXYMETAZOLINE) 0.05 % nasal spray Generic drug:  oxymetazoline Your last dose was: Your next dose is: 2 Sprays two (2) times a day. 2 Spray  
    
   
   
   
  
 ammonium lactate 12 % lotion Commonly known as:  LAC-HYDRIN Your last dose was: Your next dose is:    
   
   
 Apply  to affected area as needed. rub in to affected area well  
     
   
   
   
  
 amoxicillin-clavulanate 875-125 mg per tablet Commonly known as:  AUGMENTIN Your last dose was: Your next dose is: Take 1 Tab by mouth every twelve (12) hours for 3 days. 1 Tab  
    
   
   
   
  
 aspirin delayed-release 81 mg tablet Your last dose was: Your next dose is: Take 1 Tab by mouth daily. 81 mg  
    
   
   
   
  
 atorvastatin 80 mg tablet Commonly known as:  LIPITOR Your last dose was: Your next dose is: Take 80 mg by mouth daily. 80 mg  
    
   
   
   
  
 bacitracin zinc ointment Commonly known as:  BACITRACIN Your last dose was: Your next dose is:    
   
   
 Apply  to affected area two (2) times a day. BENADRYL EXTRA STRENGTH 2-0.1 % topical cream  
Generic drug:  diphenhydrAMINE-zinc acetate 2%-0.1% Your last dose was: Your next dose is: Apply  to affected area two (2) times daily as needed for Itching. BREO ELLIPTA 200-25 mcg/dose inhaler Generic drug:  fluticasone-vilanterol Your last dose was: Your next dose is: Take 1 Puff by inhalation daily. 1 Puff DULoxetine 60 mg capsule Commonly known as:  CYMBALTA Your last dose was: Your next dose is: Take 60 mg by mouth daily. 60 mg  
    
   
   
   
  
 ELIQUIS 5 mg tablet Generic drug:  apixaban Your last dose was: Your next dose is: Take 2.5 mg by mouth two (2) times a day. 2.5 mg  
    
   
   
   
  
 FLOMAX 0.4 mg capsule Generic drug:  tamsulosin Your last dose was: Your next dose is: Take 0.4 mg by mouth daily. 0.4 mg  
    
   
   
   
  
 insulin glargine 100 unit/mL injection Commonly known as:  LANTUS Your last dose was: Your next dose is:    
   
   
 by SubCUTAneous route nightly. Per scale  
     
   
   
   
  
 insulin lispro 100 unit/mL injection Commonly known as:  HUMALOG Your last dose was: Your next dose is:    
   
   
 by SubCUTAneous route once. ipratropium-albuterol  mcg/actuation inhaler Commonly known as:  [place] Your last dose was: Your next dose is: Take 1 Puff by inhalation two (2) times a day. 1 Puff LASIX 20 mg tablet Generic drug:  furosemide Your last dose was: Your next dose is: Take  by mouth daily. LEXAPRO 10 mg tablet Generic drug:  escitalopram oxalate Your last dose was: Your next dose is: Take 10 mg by mouth daily. 10 mg  
    
   
   
   
  
 losartan 100 mg tablet Commonly known as:  COZAAR Your last dose was: Your next dose is: Take 100 mg by mouth daily. 100 mg  
    
   
   
   
  
 melatonin 3 mg tablet Your last dose was: Your next dose is: Take 3 mg by mouth. 3 mg  
    
   
   
   
  
 mirabegron ER 25 mg ER tablet Commonly known as:  MYRBETRIQ Your last dose was: Your next dose is: Take 1 Tab by mouth daily. 25 mg  
    
   
   
   
  
 MUCINEX 600 mg ER tablet Generic drug:  guaiFENesin ER Your last dose was: Your next dose is: Take 600 mg by mouth two (2) times a day. 600 mg NARCAN 4 mg/actuation nasal spray Generic drug:  naloxone Your last dose was: Your next dose is:    
   
   
 1 [place] by IntraNASal route once as needed. Use 1 spray intranasally into 1 nostril. Use a new Narcan nasal spray for subsequent doses and administer into alternating nostrils. May repeat every 2 to 3 minutes as needed. 1 Spray NEURONTIN 400 mg capsule Generic drug:  gabapentin Your last dose was: Your next dose is: Take 400 mg by mouth three (3) times daily. 400 mg NITROSTAT 0.4 mg SL tablet Generic drug:  nitroglycerin Your last dose was: Your next dose is: 0.4 mg by SubLINGual route every five (5) minutes as needed for Chest Pain. 0.4 mg  
    
   
   
   
  
 NORVASC 10 mg tablet Generic drug:  amLODIPine Your last dose was: Your next dose is: Take 10 mg by mouth daily. 10 mg NovoLIN 70/30 100 unit/mL (70-30) injection Generic drug:  insulin NPH/insulin regular Your last dose was: Your next dose is:    
   
   
 10 Units by SubCUTAneous route two (2) times a day. 10 Units NovoLOG Mix 70-30 FlexPen 100 unit/mL (70-30) Inpn Generic drug:  insulin aspart protamine/insulin aspart Your last dose was: Your next dose is:    
   
   
 by SubCUTAneous route. nystatin 100,000 unit/gram ointment Commonly known as:  MYCOSTATIN Your last dose was: Your next dose is:    
   
   
 Apply  to affected area two (2) times a day. oxyCODONE IR 10 mg Tab immediate release tablet Commonly known as:  Linda Wheat Your last dose was: Your next dose is: Take 1 Tab by mouth every six (6) hours as needed. Max Daily Amount: 40 mg.  
 10 mg BROWN MILK OF MAGNESIA 400 mg/5 mL suspension Generic drug:  magnesium hydroxide Your last dose was: Your next dose is: Take 30 mL by mouth daily as needed for Constipation. 30 mL PLAVIX 75 mg Tab Generic drug:  clopidogrel Your last dose was: Your next dose is: Take 75 mg by mouth. 75 mg PROSCAR 5 mg tablet Generic drug:  finasteride Your last dose was: Your next dose is: Take 5 mg by mouth daily. 5 mg SECURA PROTECTIVE (ZINC OXIDE) 10 % topical cream  
Generic drug:  zinc oxide Your last dose was: Your next dose is:    
   
   
 Apply  to affected area. Senna 8.6 mg tablet Generic drug:  senna Your last dose was: Your next dose is: Take 1 Tab by mouth daily. 1 Tab  
    
   
   
   
  
 sodium bicarbonate 650 mg tablet Your last dose was: Your next dose is: Take 650 mg by mouth four (4) times daily. 650 mg  
    
   
   
   
  
 THERAGRAN-M PO Your last dose was: Your next dose is: Take  by mouth. tiotropium 18 mcg inhalation capsule Commonly known as:  Ambar Monsivais Your last dose was: Your next dose is: Take 1 Cap by inhalation daily. 1 Cap TRADJENTA 5 mg tablet Generic drug:  linagliptin Your last dose was: Your next dose is: Take 5 mg by mouth daily. 5 mg  
    
   
   
   
  
 traZODone 50 mg tablet Commonly known as:  Ginger Lawson Your last dose was: Your next dose is: Take 50 mg by mouth nightly. 50 mg  
    
   
   
   
  
 VITAMIN D3 1,000 unit Cap Generic drug:  cholecalciferol Your last dose was: Your next dose is: Take  by mouth daily. ZOFRAN ODT 8 mg disintegrating tablet Generic drug:  ondansetron Your last dose was: Your next dose is: Take 8 mg by mouth every eight (8) hours as needed for Nausea. 8 mg Where to Get Your Medications These medications were sent to [address] [place] [address]. [address] [place] [id number] Phone:  [phone]  
  amoxicillin-clavulanate 875-125 mg per tablet Information on where to get these meds will be given to you by the nurse or doctor. ! Ask your nurse or doctor about these medications  
  oxyCODONE IR 10 mg Tab immediate release tablet

## 2017-11-17 NOTE — ED PROVIDER NOTES
[facility]  Emergency Department     78 y.o. male with a PMH of MI, HTN, DM, CHF, CAD, CVA, COPD, and incomplete bladder emptying presents to the ED c/o feeling feverish since this afternoon. States he woke up from his nap and felt very sweaty. He notes having SOB and a cough for the past few days, cannot recall how many days exactly. He also notes having dark colored urine for the past few days as well. He denies any abdominal pain, nausea, vomiting, diarrhea, constipation, back pain, or other symptoms at this time. Pt is a poor historian and does not answer very many of my questions.     Current Facility-Administered Medications   Medication Dose Route Frequency    influenza vaccine 2017-18 (3 yrs+)(PF) (FLUZONE QUAD/FLUARIX QUAD) injection 0.5 mL  0.5 mL IntraMUSCular PRIOR TO DISCHARGE    sodium chloride (NS) flush 5-10 mL  5-10 mL IntraVENous PRN    levoFLOXacin (LEVAQUIN) 750 mg in D5W IVPB  750 mg IntraVENous Q48H    vancomycin (VANCOCIN) 750 mg in 0.9% sodium chloride (MBP/ADV) 250 mL ADV  750 mg IntraVENous Q24H    [START ON 11/20/2017] VANCOMYCIN INFORMATION NOTE   Other ONCE    amLODIPine (NORVASC) tablet 10 mg  10 mg Oral DAILY    apixaban (ELIQUIS) tablet 2.5 mg  2.5 mg Oral BID    aspirin delayed-release tablet 81 mg  81 mg Oral DAILY    atorvastatin (LIPITOR) tablet 80 mg  80 mg Oral DAILY    DULoxetine (CYMBALTA) capsule 60 mg  60 mg Oral DAILY    finasteride (PROSCAR) tablet 5 mg  5 mg Oral DAILY    gabapentin (NEURONTIN) capsule 400 mg  400 mg Oral TID    guaiFENesin ER (MUCINEX) tablet 600 mg  600 mg Oral BID    losartan (COZAAR) tablet 100 mg  100 mg Oral DAILY    magnesium hydroxide (MILK OF MAGNESIA) 400 mg/5 mL oral suspension 30 mL  30 mL Oral DAILY PRN    mirabegron ER (MYRBETRIQ) tablet 25 mg  25 mg Oral DAILY    nitroglycerin (NITROSTAT) tablet 0.4 mg  0.4 mg SubLINGual Q5MIN PRN    tamsulosin (FLOMAX) capsule 0.4 mg  0.4 mg Oral DAILY    0.9% sodium chloride infusion  50 mL/hr IntraVENous CONTINUOUS    acetaminophen (TYLENOL) tablet 650 mg  650 mg Oral Q4H PRN    HYDROcodone-acetaminophen (NORCO) 5-325 mg per tablet 1 Tab  1 Tab Oral Q4H PRN    ondansetron (ZOFRAN) injection 4 mg  4 mg IntraVENous Q4H PRN    clopidogrel (PLAVIX) tablet 75 mg  75 mg Oral DAILY    furosemide (LASIX) tablet 20 mg  20 mg Oral DAILY    fentaNYL (DURAGESIC) 12 mcg/hr patch 1 Patch  1 Patch TransDERmal Q72H    sodium bicarbonate tablet 650 mg  650 mg Oral QID       Past Medical History:   Diagnosis Date    CAD (coronary artery disease)     [facility]     [facility]     Diabetes ([facility]     Hypertension     Ill-defined condition     hip pain/infection    Incomplete bladder emptying     MI, old     Pneumonia     Stroke (cerebrum) ([facility])     Urinary frequency     Weak urinary stream        Past Surgical History:   Procedure Laterality Date    HX APPENDECTOMY  1976    HX ROTATOR CUFF REPAIR         Family History   Problem Relation Age of Onset    Cancer Father      Pancreatic       Social History     Social History    Marital status: SINGLE     Spouse name: N/A    Number of children: N/A    Years of education: N/A     Occupational History    Not on file. Social History Main Topics    Smoking status: Current Every Day Smoker     Packs/day: 1.00    Smokeless tobacco: Not on file    Alcohol use 3.6 oz/week     6 Cans of beer per week    Drug use: Not on file    Sexual activity: Not on file     Other Topics Concern    Not on file     Social History Narrative       Allergies   Allergen Reactions    Biaxin [Clarithromycin] Rash       Patient's primary care provider (as noted in EPIC): Dawn Chan MD    Constitutional: + fever. Denies malaise, chills. Cardiac:  Denies chest pain or palpitations. Respiratory:  + cough, SOB. Denies wheezing.   GI/ABD:  Denies injury, pain, distention, nausea, vomiting, diarrhea. : + dark urine. Denies injury, pain, dysuria or urgency. Back:  Denies injury or pain. Neuro:  Denies dizziness, neurologic symptoms/deficits/paresthesias. Skin: Denies injury, rash, itching or skin changes. All other systems negative as reviewed. Visit Vitals    /67 (BP 1 Location: Left arm)    Pulse 70    Temp 98 °F (36.7 °C)    Resp 18    Ht 5' 9\" (1.753 m)    Wt 70.3 kg (155 lb)    SpO2 91%    BMI 22.89 kg/m2       PHYSICAL EXAM:    CONSTITUTIONAL:  Alert, in no apparent distress;  well developed;  well nourished. HEAD:  Normocephalic, atraumatic. EYES:  EOMI. Non-icteric sclera. Normal conjunctiva. ENTM:  Mouth: mucous membranes dry. NECK:  Supple  RESPIRATORY:  Chest clear, equal breath sounds, good air movement. Without wheezes, rhonchi or rales. CARDIOVASCULAR:  Regular rate and rhythm. No murmurs, rubs, or gallops. GI:  Normal bowel sounds, abdomen soft and non-tender. No rebound or guarding. BACK:  Non-tender. LE: right superior foot with stage IV ulcer with surrounding erythema; right heel with necrosis noted; minimal sensation to right foot; pedal pulses intact. NEURO:  Moves all four extremities, and grossly normal motor exam.  SKIN:  No rashes;  Normal for age. PSYCH:  Alert and normal affect. DIFFERENTIAL DIAGNOSES/ MEDICAL DECISION MAKING:   COPD/asthma, pneumonia, upper respiratory infection, UTI, cardiac event to include acute coronary syndrome, acute myocardial infarction, cellulitis, or a combination of the above (ex URI on top of COPD thus causing respiratory distress).     CXR: NAD as read by IDANIA King and Dr. Amie Pablo    Recent Results (from the past 12 hour(s))   POC LACTIC ACID    Collection Time: 11/17/17 10:34 PM   Result Value Ref Range    Lactic Acid (POC) 1.4 0.4 - 2.0 mmol/L   GLUCOSE, POC    Collection Time: 11/18/17 12:42 AM   Result Value Ref Range    Glucose (POC) 189 (H) 70 - 110 mg/dL   CARDIAC PANEL,(CK, CKMB & TROPONIN)    Collection Time: 11/18/17  1:53 AM   Result Value Ref Range     39 - 308 U/L    CK - MB 4.4 (H) <3.6 ng/ml    CK-MB Index 3.0 0.0 - 4.0 %    Troponin-I, Qt. 0.67 (H) 0.0 - 0.045 NG/ML   CBC WITH AUTOMATED DIFF    Collection Time: 11/18/17  6:06 AM   Result Value Ref Range    WBC 14.3 (H) 4.6 - 13.2 K/uL    RBC 2.39 (L) 4.70 - 5.50 M/uL    HGB 7.0 (L) 13.0 - 16.0 g/dL    HCT 22.0 (L) 36.0 - 48.0 %    MCV 92.1 74.0 - 97.0 FL    MCH 29.3 24.0 - 34.0 PG    MCHC 31.8 31.0 - 37.0 g/dL    RDW 14.3 11.6 - 14.5 %    PLATELET 025 287 - 508 K/uL    MPV 9.5 9.2 - 11.8 FL    NEUTROPHILS 85 (H) 40 - 73 %    LYMPHOCYTES 10 (L) 21 - 52 %    MONOCYTES 4 3 - 10 %    EOSINOPHILS 1 0 - 5 %    BASOPHILS 0 0 - 2 %    ABS. NEUTROPHILS 12.1 (H) 1.8 - 8.0 K/UL    ABS. LYMPHOCYTES 1.4 0.9 - 3.6 K/UL    ABS. MONOCYTES 0.6 0.05 - 1.2 K/UL    ABS. EOSINOPHILS 0.2 0.0 - 0.4 K/UL    ABS. BASOPHILS 0.0 0.0 - 0.06 K/UL    DF AUTOMATED     METABOLIC PANEL, COMPREHENSIVE    Collection Time: 11/18/17  6:06 AM   Result Value Ref Range    Sodium 138 136 - 145 mmol/L    Potassium 6.0 (H) 3.5 - 5.5 mmol/L    Chloride 107 100 - 108 mmol/L    CO2 24 21 - 32 mmol/L    Anion gap 7 3.0 - 18 mmol/L    Glucose 112 (H) 74 - 99 mg/dL    BUN 49 (H) 7.0 - 18 MG/DL    Creatinine 2.47 (H) 0.6 - 1.3 MG/DL    BUN/Creatinine ratio 20 12 - 20      GFR est AA 32 (L) >60 ml/min/1.73m2    GFR est non-AA 26 (L) >60 ml/min/1.73m2    Calcium 7.4 (L) 8.5 - 10.1 MG/DL    Bilirubin, total 0.3 0.2 - 1.0 MG/DL    ALT (SGPT) 11 (L) 16 - 61 U/L    AST (SGOT) 14 (L) 15 - 37 U/L    Alk.  phosphatase 69 45 - 117 U/L    Protein, total 5.2 (L) 6.4 - 8.2 g/dL    Albumin 1.8 (L) 3.4 - 5.0 g/dL    Globulin 3.4 2.0 - 4.0 g/dL    A-G Ratio 0.5 (L) 0.8 - 1.7     TSH 3RD GENERATION    Collection Time: 11/18/17  6:06 AM   Result Value Ref Range    TSH 0.45 0.36 - 3.74 uIU/mL   CARDIAC PANEL,(CK, CKMB & TROPONIN)    Collection Time: 11/18/17  6:06 AM   Result Value Ref Range     39 - 308 U/L    CK - MB 3.9 (H) <3.6 ng/ml    CK-MB Index 3.1 0.0 - 4.0 %    Troponin-I, Qt. 0.61 (H) 0.0 - 0.045 NG/ML      WBC count 13 with left shift. CXR: NAD    UA shows dehydration, not alarming for infection. Pt was given 1 L NS, and initially given sepsis protocol abx for HCAP. Lactate WNL. Right foot does have infected ulcer present. Consult 2:45 PM:   I have discussed case with Dr. Flo Louise, hospitalist.  Standard discussion regarding this patient's presenting symptoms, PMHX, exam, vital signs, available ancillary and diagnostic studies. Consulting MD states: he will come and see the patient and accept him for admission. Requests podiatry consult. Diagnosis:   1. Pressure ulcer of dorsum of right foot, stage 4 (HCC)    2. TAD (acute kidney injury) ([facility] [address]    3. Dehydration    4. Elevated troponin        Disposition: Admission    Current Discharge Medication List      CONTINUE these medications which have NOT CHANGED    Details   atorvastatin (LIPITOR) 80 mg tablet Take 80 mg by mouth daily. bacitracin zinc (BACITRACIN) ointment Apply  to affected area two (2) times a day. fluticasone-vilanterol (BREO ELLIPTA) 200-25 mcg/dose inhaler Take 1 Puff by inhalation daily. fentaNYL (DURAGESIC) 12 mcg/hr patch 1 Patch by TransDERmal route every seventy-two (72) hours. ammonium lactate (LAC-HYDRIN) 12 % lotion Apply  to affected area as needed. rub in to affected area well      escitalopram oxalate (LEXAPRO) 10 mg tablet Take 10 mg by mouth daily. melatonin 3 mg tablet Take 3 mg by mouth.      magnesium hydroxide (BROWN MILK OF MAGNESIA) 400 mg/5 mL suspension Take 30 mL by mouth daily as needed for Constipation. guaiFENesin ER (MUCINEX) 600 mg ER tablet Take 600 mg by mouth two (2) times a day.      naloxone (NARCAN) 4 mg/actuation nasal spray 1 [place] by IntraNASal route once as needed. Use 1 spray intranasally into 1 nostril.  Use a new Narcan nasal spray for subsequent doses and administer into alternating nostrils. May repeat every 2 to 3 minutes as needed. gabapentin (NEURONTIN) 400 mg capsule Take 400 mg by mouth three (3) times daily. nitroglycerin (NITROSTAT) 0.4 mg SL tablet 0.4 mg by SubLINGual route every five (5) minutes as needed for Chest Pain. amLODIPine (NORVASC) 10 mg tablet Take 10 mg by mouth daily. insulin NPH/insulin regular (NOVOLIN 70/30) 100 unit/mL (70-30) injection 10 Units by SubCUTAneous route two (2) times a day. insulin aspart protamine/insulin aspart (NOVOLOG MIX 70-30 FLEXPEN) 100 unit/mL (70-30) inpn by SubCUTAneous route. nystatin (MYCOSTATIN) 100,000 unit/gram ointment Apply  to affected area two (2) times a day. oxyCODONE IR (ROXICODONE) 10 mg tab immediate release tablet Take 10 mg by mouth. clopidogrel (PLAVIX) 75 mg tab Take 75 mg by mouth. finasteride (PROSCAR) 5 mg tablet Take 5 mg by mouth daily. zinc oxide (SECURA PROTECTIVE, ZINC OXIDE,) 10 % topical cream Apply  to affected area. senna (SENNA) 8.6 mg tablet Take 1 Tab by mouth daily. sodium bicarbonate 650 mg tablet Take 650 mg by mouth four (4) times daily. OMEGA-3 FATTY ACIDS/FISH OIL (THERAGRAN-M PO) Take  by mouth. tiotropium (SPIRIVA) 18 mcg inhalation capsule Take 1 Cap by inhalation daily. linagliptin (TRADJENTA) 5 mg tablet Take 5 mg by mouth daily. traZODone (DESYREL) 50 mg tablet Take 50 mg by mouth nightly. cholecalciferol (VITAMIN D3) 1,000 unit cap Take  by mouth daily. ondansetron (ZOFRAN ODT) 8 mg disintegrating tablet Take 8 mg by mouth every eight (8) hours as needed for Nausea. acetaminophen (TYLENOL) 325 mg tablet Take  by mouth every four (4) hours as needed for Pain. oxymetazoline (AFRIN, OXYMETAZOLINE,) 0.05 % nasal spray 2 Sprays two (2) times a day.       diphenhydrAMINE-zinc acetate 2%-0.1% (BENADRYL EXTRA STRENGTH) 2-0.1 % topical cream Apply  to affected area two (2) times daily as needed for Itching. DULoxetine (CYMBALTA) 60 mg capsule Take 60 mg by mouth daily. apixaban (ELIQUIS) 5 mg tablet Take 2.5 mg by mouth two (2) times a day. tamsulosin (FLOMAX) 0.4 mg capsule Take 0.4 mg by mouth daily. furosemide (LASIX) 20 mg tablet Take  by mouth daily. losartan (COZAAR) 100 mg tablet Take 100 mg by mouth daily. mirabegron ER (MYRBETRIQ) 25 mg ER tablet Take 1 Tab by mouth daily. Qty: 30 Tab, Refills: 6      aspirin delayed-release 81 mg tablet Take 1 Tab by mouth daily. Qty: 30 Tab, Refills: 1      ipratropium-albuterol (COMBIVENT RESPIMAT)  mcg/actuation inhaler Take 1 Puff by inhalation two (2) times a day. Qty: 1 Inhaler, Refills: 1      insulin glargine (LANTUS) 100 unit/mL injection by SubCUTAneous route nightly. Per scale      insulin lispro (HUMALOG) 100 unit/mL injection by SubCUTAneous route once.            IDANIA Villalobos

## 2017-11-17 NOTE — IP AVS SNAPSHOT
[address] [address] 
[phone] Patient: Dewayne Zazueta MRN: [id number] About your hospitalization You were admitted on:  November 17, 2017 You last received care in the:  [facility] 2 NEURO MED You were discharged on:  November 27, 2017 Why you were hospitalized Your primary diagnosis was:  Gangrene (Hcc) Your diagnoses also included:  Pain Of Foot Due To Ischemia When Walking (Hcc) Things You Need To Do (next 8 weeks) Follow up with Moses Ashton MD in 1 week(s) Phone:  [phone] Where:  [facility], [address], [address] Follow up with Cristina Nina DPM in 1 week(s) Phone:  [phone] Where:  [address] [facility], [address], [place] [address] Follow up with [facility] OP WOUND CARE in 1 week(s) Phone:  [phone] Where:  [address] [address], [address] [facility] 43 Discharge Orders None A check carolina indicates which time of day the medication should be taken. My Medications STOP taking these medications   
 fentaNYL 12 mcg/hr patch Commonly known as:  [address] these medications as instructed Instructions Each Dose to Equal  
 Morning Noon Evening Bedtime  
 acetaminophen 325 mg tablet Commonly known as:  TYLENOL Your last dose was: Your next dose is: Take  by mouth every four (4) hours as needed for Pain. AFRIN (OXYMETAZOLINE) 0.05 % nasal spray Generic drug:  oxymetazoline Your last dose was: Your next dose is: 2 Sprays two (2) times a day. 2 Spray  
    
   
   
   
  
 ammonium lactate 12 % lotion Commonly known as:  LAC-HYDRIN Your last dose was: Your next dose is:    
   
   
 Apply  to affected area as needed. rub in to affected area well amoxicillin-clavulanate 875-125 mg per tablet Commonly known as:  AUGMENTIN Your last dose was: Your next dose is: Take 1 Tab by mouth every twelve (12) hours for 3 days. 1 Tab  
    
   
   
   
  
 aspirin delayed-release 81 mg tablet Your last dose was: Your next dose is: Take 1 Tab by mouth daily. 81 mg  
    
   
   
   
  
 atorvastatin 80 mg tablet Commonly known as:  LIPITOR Your last dose was: Your next dose is: Take 80 mg by mouth daily. 80 mg  
    
   
   
   
  
 bacitracin zinc ointment Commonly known as:  BACITRACIN Your last dose was: Your next dose is:    
   
   
 Apply  to affected area two (2) times a day. BENADRYL EXTRA STRENGTH 2-0.1 % topical cream  
Generic drug:  diphenhydrAMINE-zinc acetate 2%-0.1% Your last dose was: Your next dose is:    
   
   
 Apply  to affected area two (2) times daily as needed for Itching. BREO ELLIPTA 200-25 mcg/dose inhaler Generic drug:  fluticasone-vilanterol Your last dose was: Your next dose is: Take 1 Puff by inhalation daily. 1 Puff DULoxetine 60 mg capsule Commonly known as:  CYMBALTA Your last dose was: Your next dose is: Take 60 mg by mouth daily. 60 mg  
    
   
   
   
  
 ELIQUIS 5 mg tablet Generic drug:  apixaban Your last dose was: Your next dose is: Take 2.5 mg by mouth two (2) times a day. 2.5 mg  
    
   
   
   
  
 FLOMAX 0.4 mg capsule Generic drug:  tamsulosin Your last dose was: Your next dose is: Take 0.4 mg by mouth daily. 0.4 mg  
    
   
   
   
  
 insulin glargine 100 unit/mL injection Commonly known as:  LANTUS Your last dose was: Your next dose is: by SubCUTAneous route nightly. Per scale  
     
   
   
   
  
 insulin lispro 100 unit/mL injection Commonly known as:  HUMALOG Your last dose was: Your next dose is:    
   
   
 by SubCUTAneous route once. ipratropium-albuterol  mcg/actuation inhaler Commonly known as:  Lajean Lat Your last dose was: Your next dose is: Take 1 Puff by inhalation two (2) times a day. 1 Puff LASIX 20 mg tablet Generic drug:  furosemide Your last dose was: Your next dose is: Take  by mouth daily. LEXAPRO 10 mg tablet Generic drug:  escitalopram oxalate Your last dose was: Your next dose is: Take 10 mg by mouth daily. 10 mg  
    
   
   
   
  
 losartan 100 mg tablet Commonly known as:  COZAAR Your last dose was: Your next dose is: Take 100 mg by mouth daily. 100 mg  
    
   
   
   
  
 melatonin 3 mg tablet Your last dose was: Your next dose is: Take 3 mg by mouth. 3 mg  
    
   
   
   
  
 mirabegron ER 25 mg ER tablet Commonly known as:  MYRBETRIQ Your last dose was: Your next dose is: Take 1 Tab by mouth daily. 25 mg  
    
   
   
   
  
 MUCINEX 600 mg ER tablet Generic drug:  guaiFENesin ER Your last dose was: Your next dose is: Take 600 mg by mouth two (2) times a day. 600 mg NARCAN 4 mg/actuation nasal spray Generic drug:  naloxone Your last dose was: Your next dose is:    
   
   
 1 [place] by IntraNASal route once as needed. Use 1 spray intranasally into 1 nostril. Use a new Narcan nasal spray for subsequent doses and administer into alternating nostrils. May repeat every 2 to 3 minutes as needed. 1 Spray NEURONTIN 400 mg capsule Generic drug:  gabapentin Your last dose was: Your next dose is: Take 400 mg by mouth three (3) times daily. 400 mg NITROSTAT 0.4 mg SL tablet Generic drug:  nitroglycerin Your last dose was: Your next dose is: 0.4 mg by SubLINGual route every five (5) minutes as needed for Chest Pain. 0.4 mg  
    
   
   
   
  
 NORVASC 10 mg tablet Generic drug:  amLODIPine Your last dose was: Your next dose is: Take 10 mg by mouth daily. 10 mg NovoLIN 70/30 100 unit/mL (70-30) injection Generic drug:  insulin NPH/insulin regular Your last dose was: Your next dose is:    
   
   
 10 Units by SubCUTAneous route two (2) times a day. 10 Units NovoLOG Mix 70-30 FlexPen 100 unit/mL (70-30) Inpn Generic drug:  insulin aspart protamine/insulin aspart Your last dose was: Your next dose is:    
   
   
 by SubCUTAneous route. nystatin 100,000 unit/gram ointment Commonly known as:  MYCOSTATIN Your last dose was: Your next dose is:    
   
   
 Apply  to affected area two (2) times a day. oxyCODONE IR 10 mg Tab immediate release tablet Commonly known as:  Ina Moody Your last dose was: Your next dose is: Take 1 Tab by mouth every six (6) hours as needed. Max Daily Amount: 40 mg.  
 10 mg BROWN MILK OF MAGNESIA 400 mg/5 mL suspension Generic drug:  magnesium hydroxide Your last dose was: Your next dose is: Take 30 mL by mouth daily as needed for Constipation. 30 mL PLAVIX 75 mg Tab Generic drug:  clopidogrel Your last dose was: Your next dose is: Take 75 mg by mouth. 75 mg PROSCAR 5 mg tablet Generic drug:  finasteride Your last dose was: Your next dose is: Take 5 mg by mouth daily. 5 mg SECURA PROTECTIVE (ZINC OXIDE) 10 % topical cream  
Generic drug:  zinc oxide Your last dose was: Your next dose is:    
   
   
 Apply  to affected area. Senna 8.6 mg tablet Generic drug:  senna Your last dose was: Your next dose is: Take 1 Tab by mouth daily. 1 Tab  
    
   
   
   
  
 sodium bicarbonate 650 mg tablet Your last dose was: Your next dose is: Take 650 mg by mouth four (4) times daily. 650 mg  
    
   
   
   
  
 THERAGRAN-M PO Your last dose was: Your next dose is: Take  by mouth. tiotropium 18 mcg inhalation capsule Commonly known as:  Kirt Valencia Your last dose was: Your next dose is: Take 1 Cap by inhalation daily. 1 Cap TRADJENTA 5 mg tablet Generic drug:  linagliptin Your last dose was: Your next dose is: Take 5 mg by mouth daily. 5 mg  
    
   
   
   
  
 traZODone 50 mg tablet Commonly known as:  Corin Manzo Your last dose was: Your next dose is: Take 50 mg by mouth nightly. 50 mg  
    
   
   
   
  
 VITAMIN D3 1,000 unit Cap Generic drug:  cholecalciferol Your last dose was: Your next dose is: Take  by mouth daily. ZOFRAN ODT 8 mg disintegrating tablet Generic drug:  ondansetron Your last dose was: Your next dose is: Take 8 mg by mouth every eight (8) hours as needed for Nausea. 8 mg Where to Get Your Medications These medications were sent to [address] [place] [address]. Replaced by [facility] [address] [address] Phone:  [phone]  
  amoxicillin-clavulanate 875-125 mg per tablet Information on where to get these meds will be given to you by the nurse or doctor. ! Ask your nurse or doctor about these medications  
  oxyCODONE IR 10 mg Tab immediate release tablet Discharge Instructions DISCHARGE SUMMARY from Nurse PATIENT INSTRUCTIONS: 
 
 
F-face looks uneven A-arms unable to move or move unevenly S-speech slurred or non-existent T-time-call 911 as soon as signs and symptoms begin-DO NOT go Back to bed or wait to see if you get better-TIME IS BRAIN. Warning Signs of HEART ATTACK Call 911 if you have these symptoms: 
? Chest discomfort. Most heart attacks involve discomfort in the center of the chest that lasts more than a few minutes, or that goes away and comes back. It can feel like uncomfortable pressure, squeezing, fullness, or pain. ? Discomfort in other areas of the upper body. Symptoms can include pain or discomfort in one or both arms, the back, neck, jaw, or stomach. ? Shortness of breath with or without chest discomfort. ? Other signs may include breaking out in a cold sweat, nausea, or lightheadedness. Don't wait more than five minutes to call [address]! Fast action can save your life. Calling 911 is almost always the fastest way to get lifesaving treatment. Emergency Medical Services staff can begin treatment when they arrive  up to an hour sooner than if someone gets to the hospital by car. Patient armband removed and shredded. The discharge information has been reviewed with the patient. The patient verbalized understanding. Discharge medications reviewed with the patient and appropriate educational materials and side effects teaching were provided. ___________________________________________________________________________________________________________________________________ MyChart Announcement We are excited to announce that we are making your provider's discharge notes available to you in [employer]. You will see these notes when they are completed and signed by the physician that discharged you from your recent hospital stay. If you have any questions or concerns about any information you see in [employer], please call the Health Information Department where you were seen or reach out to your Primary Care Provider for more information about your plan of care. Introducing [facility]! [employer] introduces [employer] patient portal. Now you can access parts of your medical record, email your doctor's office, and request medication refills online. 1. In your internet browser, go to [email or web address] 2. Click on the First Time User? Click Here link in the Sign In box. You will see the New Member Sign Up page. 3. Enter your [employer] Access Code exactly as it appears below. You will not need to use this code after youve completed the sign-up process. If you do not sign up before the expiration date, you must request a new code. · [employer] Access Code: [facility] Expires: 2/16/2018  1:46 AM 
 
4. Enter the last four digits of your Social Security Number (xxxx) and Date of Birth (mm/dd/yyyy) as indicated and click Submit. You will be taken to the next sign-up page. 5. Create a [employer] ID. This will be your [employer] login ID and cannot be changed, so think of one that is secure and easy to remember. 6. Create a [employer] password. You can change your password at any time. 7. Enter your Password Reset Question and Answer.  This can be used at a later time if you forget your password. 8. Enter your e-mail address. You will receive e-mail notification when new information is available in [address] 9. Click Sign Up. You can now view and download portions of your medical record. 10. Click the Download Summary menu link to download a portable copy of your medical information. If you have questions, please visit the Frequently Asked Questions section of the MyChart website. Remember, [employer] is NOT to be used for urgent needs. For medical emergencies, dial 911. Now available from your iPhone and Android! Providers Seen During Your Hospitalization Provider Specialty Primary office phone Megan Dunlap MD Emergency Medicine [phone] Gregorio Abreu MD Internal Medicine [phone] Ambreen Ren MD [facility] [phone] Yessica Pardo DO Internal Medicine [phone] Destiny Sesay, [address] [place] [address] Internal Medicine [phone] Tania Hernandez DO Internal Medicine [phone] Immunizations Administered for This Admission Name Date Influenza Vaccine (Quad) PF  Deferred () Your Primary Care Physician (PCP) Primary Care Physician Office Phone Office Fax Maria De Jesus Pringle [phone] You are allergic to the following Allergen Reactions Biaxin (Clarithromycin) Rash Recent Documentation Height Weight BMI Smoking Status 1.765 m 77.1 kg 24.74 kg/m2 Current Every Day Smoker Emergency Contacts Name Discharge Info Relation Home Work Mobile Monica Katz DISCHARGE CAREGIVER [3] Mother [14] [phone] My Self  Other Relative [6] [phone] Patient Belongings The following personal items are in your possession at time of discharge: 
  Dental Appliances: Other (comment)  Visual Aid: None   Hearing Aids/Status: At home         Clothing:  (everything kept in pt room, 2111) Discharge Instructions Attachments/References WOUND: VAC (VACUUM-ASSISTED CLOSURE) (ENGLISH) AMOXICILLIN/CLAVULANATE POTASSIUM (BY MOUTH) (ENGLISH) OXYCODONE, RAPID RELEASE (BY MOUTH) (ENGLISH) Patient Handouts Vacuum-Assisted Closure for Wound Healing: Care Instructions Your Care Instructions When you have a wound that is hard to close your doctor may treat it with vacuum-assisted closure (VAC). VAC uses negative pressure (suction) to help bring the edges of your wound together. It also removes fluid and dead tissue from the wound area. In VAC: 
· A special piece of foam or cotton gauze fits over your wound. This covers and protects the wound. A clear bandage (film dressing) goes several inches beyond the foam or gauze dressing to create a seal for the vacuum. · A tube connects the foam to a small machine called the therapy unit. The therapy unit creates the suction. · The [facility] system may be carried around (portable) or may stay in one place (stationary). VAC does not hurt. You may feel a mild pulling on the wound when treatment first starts. How long you need VAC depends on the size and type of wound you have and how well the [facility] works. You will be limited in what you can do while the wound heals. You will use VAC 24 hours a day. Follow-up care is a key part of your treatment and safety. Be sure to make and go to all appointments, and call your doctor if you are having problems. It's also a good idea to know your test results and keep a list of the medicines you take. How can you care for yourself at home? · A home health care worker will come to your home a few times a week to change the bandage and check the machine. You may need it changed more often if there is a lot of drainage. · Your doctor will give you information on what you can and can't do. This depends on where your wound is located. Your activities may be limited during the time you're using VAC. · You will be able to take sponge baths. Don't shower or take baths unless your doctor says it is okay. · Take pain medicines exactly as directed. ¨ If the doctor gave you a prescription medicine for pain, take it as prescribed. ¨ If you are not taking a prescription pain medicine, ask your doctor if you can take an over-the-counter medicine. · If your doctor prescribed antibiotics, take them as directed. Do not stop taking them just because you feel better. You need to take the full course of antibiotics. When should you call for help? Call 911 anytime you think you may need emergency care. For example, call if: 
? · You have a lot of bleeding or see a sudden change in the color or texture of the drainage. ? · The wound splits open and organs under the skin can be seen (evisceration). ?Call your doctor now or seek immediate medical care if: 
? · The wound starts bleeding. ? · The bandage comes off. Cover the area with a sterile bandage until you can see your doctor or your home health care worker comes by. ? · You have signs of infection, such as: 
¨ Increased pain, swelling, warmth, or redness around the wound. ¨ Red streaks leading from the wound. ¨ Pus draining from the wound. ¨ A fever. ? Watch closely for changes in your health, and be sure to contact your doctor if: 
? · The noise the machine makes changes or gets very loud. This may mean the seal is broken or the machine is not producing enough suction. Where can you learn more? Go to http://lani-caleb.info/. Enter K313 in the search box to learn more about \"Vacuum-Assisted Closure for Wound Healing: Care Instructions. \" Current as of: March 20, 2017 Content Version: 11.4 © 1952-3102 [employer]. Care instructions adapted under license by [employer] (which disclaims liability or warranty for this information).  If you have questions about a medical condition or this instruction, always ask your healthcare professional. [address] [address] any warranty or liability for your use of this information. Amoxicillin/Clavulanate Potassium (By mouth) Amoxicillin (b-ver-d-JOLLY-in), Clavulanate Potassium (WAAD-to-ju-harris yno-SQM-ps-um) Treats infections. This medicine is a penicillin antibiotic. Brand Name(s): Augmentin, Augmentin ES-600, Augmentin XR There may be other brand names for this medicine. When This Medicine Should Not Be Used: This medicine is not right for everyone. Do not use it if you had an allergic reaction to amoxicillin, clavulanate, or a similar antibiotic (penicillin or cephalosporin), or if you had liver problems caused by Augmentin®. How to Use This Medicine:  
Liquid, Tablet, Chewable Tablet, Long Acting Tablet · Your doctor will tell you how much medicine to use. Do not use more than directed. · Take this medicine with a snack or at the beginning of a meal to help prevent nausea. · Chewable tablets: Chew the tablet completely before you swallow it. · Measure the oral liquid medicine with a marked measuring spoon, oral syringe, or medicine cup. Shake the medicine well just before you measure each dose. Rinse the spoon or dropper after each use. · Swallow the extended-release tablet whole. Do not crush, break, or chew it. · Take all of the medicine in your prescription to clear up your infection, even if you feel better after the first few doses. · Missed dose: Take a dose as soon as you remember. If it is almost time for your next dose, wait until then and take a regular dose. Do not take extra medicine to make up for a missed dose. · Tablet, extended-release tablet, chewable tablet: Store at room temperature, away from heat, moisture, and direct light. · Oral liquid: Store in the refrigerator. Do not freeze. · Throw away any unused oral liquid after 10 days. Drugs and Foods to Avoid: Ask your doctor or pharmacist before using any other medicine, including over-the-counter medicines, vitamins, and herbal products. · Some medicines can affect how this medicine works. Tell your doctor if you are taking a blood thinner (such as warfarin), allopurinol, or probenecid. Warnings While Using This Medicine: · Tell your doctor if you are pregnant or breastfeeding, or if you have kidney disease, liver disease, or mononucleosis (mono). · Birth control pills may not work as well while you are taking this medicine. Use another form of birth control to prevent pregnancy. · This medicine can cause diarrhea. Call your doctor if the diarrhea becomes severe, does not stop, or is bloody. Do not take any medicine to stop diarrhea until you have talked to your doctor. Diarrhea can occur 2 months or more after you stop taking this medicine. · Tell any doctor or dentist who treats you that you are using this medicine. This medicine may affect certain medical test results. · Call your doctor if your symptoms do not improve or if they get worse. · The chewable tablet and oral liquid contain phenylalanine. Talk to your doctor before you use this medicine if you have phenylketonuria (PKU). · Keep all medicine out of the reach of children. Never share your medicine with anyone. Possible Side Effects While Using This Medicine:  
Call your doctor right away if you notice any of these side effects: · Allergic reaction: Itching or hives, swelling in your face or hands, swelling or tingling in your mouth or throat, chest tightness, trouble breathing · Blistering, peeling, red skin rash · Change in how much or how often you urinate · Dark urine or pale stools, nausea, vomiting, loss of appetite, stomach pain, yellow eyes or skin · Diarrhea that may contain blood, stomach cramps If you notice these less serious side effects, talk with your doctor: · Diaper rash · Mild diarrhea, nausea, vomiting · Tooth discoloration (in children) If you notice other side effects that you think are caused by this medicine, tell your doctor. Call your doctor for medical advice about side effects. You may report side effects to FDA at 8-235-UBW-2053 © 2017 [facility] Information is for End User's use only and may not be sold, redistributed or otherwise used for commercial purposes. The above information is an  only. It is not intended as medical advice for individual conditions or treatments. Talk to your doctor, nurse or pharmacist before following any medical regimen to see if it is safe and effective for you. Oxycodone, Rapid Release (By mouth) Oxycodone Hydrochloride (aq-u-TAK-done otto-droe-KLOR-rickey) Treats moderate to severe pain. This medicine is a narcotic pain reliever. Brand Name(s): Oxaydo, Oxy IR, Roxicodone There may be other brand names for this medicine. When This Medicine Should Not Be Used: This medicine is not right for everyone. Do not use it if you had an allergic reaction to oxycodone, codeine, hydrocodone, dihydrocodeine, or morphine, or you have a stomach or bowel blockage. How to Use This Medicine:  
Capsule, Liquid, Tablet · Take your medicine as directed. Your dose may need to be changed several times to find what works best for you. · An overdose can be dangerous. Follow directions carefully so you do not get too much medicine at one time. · Oral liquid: Measure the oral liquid medicine with a marked measuring spoon, oral syringe, or medicine cup. · Oxaydo® tablet: Swallow it whole with enough water to swallow it completely. Do not break, crush, chew, or dissolve it. Do not wet the tablet before you put it in your mouth. · This medicine should come with a Medication Guide. Ask your pharmacist for a copy if you do not have one. · Missed dose: Take a dose as soon as you remember.  If it is almost time for your next dose, wait until then and take a regular dose. Do not take extra medicine to make up for a missed dose. · Store the medicine in a closed container at room temperature, away from heat, moisture, and direct light. Store the medicine in a secure place to prevent others from getting it. Ask your pharmacist about the best way to dispose of medicine you do not use. Drugs and Foods to Avoid: Ask your doctor or pharmacist before using any other medicine, including over-the-counter medicines, vitamins, and herbal products. · Do not use this medicine if you are using or have used an MAO inhibitor within the past 14 days. · Some medicines can affect how oxycodone works. Tell your doctor if you are using any of the following: ¨ Amiodarone, carbamazepine, erythromycin, ketoconazole, phenytoin, quinidine, rifampin, ritonavir ¨ Diuretic (water pill) ¨ Medicine to treat depression or anxiety ¨ Medicine to treat migraine headaches ¨ Phenothiazine medicine · Tell your doctor if you use anything else that makes you sleepy. Some examples are allergy medicine, narcotic pain medicine, and alcohol. Tell your doctor if you are using buprenorphine, butorphanol, nalbuphine, pentazocine, or a muscle relaxer. · Do not drink alcohol while you are using this medicine. Warnings While Using This Medicine: · Tell your doctor if you are pregnant or breastfeeding, or if you have kidney disease, liver disease, heart disease, low blood pressure, lung disease or breathing problems (such as asthma, COPD), scoliosis, an enlarged prostate or trouble urinating, an underactive thyroid, [place] disease, gallbladder or pancreas problems, or digestion problems. Tell your doctor if you have a history of head injury, brain tumor, mental health problems, seizures, or alcohol or drug addiction. · This medicine may cause the following problems: 
¨ High risk of overdose, which can lead to death ¨ Respiratory depression (serious breathing problem that can be life-threatening) ¨ Serotonin syndrome, when used with certain medicines · This medicine may make you dizzy, drowsy, or faint. Do not drive or do anything else that could be dangerous until you know how this medicine affects you. Sit or lie down if you feel dizzy. Stand up carefully. · This medicine can be habit-forming. Do not use more than your prescribed dose. Call your doctor if you think your medicine is not working. · Do not stop using this medicine suddenly. Your doctor will need to slowly decrease your dose before you stop it completely. · This medicine may cause constipation, especially with long-term use. Ask your doctor if you should use a laxative to prevent and treat constipation. Drink plenty of liquids to help avoid constipation. · This medicine could cause infertility. Talk with your doctor before using this medicine if you plan to have children. · Keep all medicine out of the reach of children. Never share your medicine with anyone. Possible Side Effects While Using This Medicine:  
Call your doctor right away if you notice any of these side effects: · Allergic reaction: Itching or hives, swelling in your face or hands, swelling or tingling in your mouth or throat, chest tightness, trouble breathing · Anxiety, restlessness, fast heartbeat, fever, sweating, muscle spasms, twitching, nausea, vomiting, diarrhea, seeing or hearing things that are not there · Blue lips, fingernails, or skin, trouble breathing · Extreme dizziness or weakness, shallow breathing, slow heartbeat, sweating, cold or clammy skin, seizures · Lightheadedness, dizziness, fainting · Severe constipation, stomach pain If you notice these less serious side effects, talk with your doctor: · Mild constipation · Sleepiness, tiredness If you notice other side effects that you think are caused by this medicine, tell your doctor. Call your doctor for medical advice about side effects. You may report side effects to FDA at 4-652-FDA-2018 © 2017 [address] Information is for End User's use only and may not be sold, redistributed or otherwise used for commercial purposes. The above information is an  only. It is not intended as medical advice for individual conditions or treatments. Talk to your doctor, nurse or pharmacist before following any medical regimen to see if it is safe and effective for you. Please provide this summary of care documentation to your next provider. Signatures-by signing, you are acknowledging that this After Visit Summary has been reviewed with you and you have received a copy. Patient Signature:  ____________________________________________________________ Date:  ____________________________________________________________  
  
Janyth [facility] Provider Signature:  ____________________________________________________________ Date:  ____________________________________________________________

## 2017-11-17 NOTE — PROGRESS NOTES
Pharmacy Dosing Services: Vancomycin    Indication: HAP    Day of therapy: 0    Other Antimicrobials (Include dose, start day & day of therapy):  Levaquin 750mg IV q 48h-  Zosyn 4.5g IV x 1-      Loading dose (date given): 1750mg x 1,   Current Maintenance dose: 750 mg IV q24h     Goal Vancomycin Level: 15-20  (Trough 15-20 for most infections, 20 for meningitis/osteomyelitis, pre-HD level ~25)    Vancomycin Level (if drawn): n/a     Significant Cultures: pending     Renal function stable? (unstable defined as SCr increase of 0.5 mg/dL or > 50% increase from baseline, whichever is greater) (Y/N): N     CAPD, Hemodialysis or Renal Replacement Therapy (Y/N): N     Recent Labs      17   1317   CREA  2.40*   BUN  44*   WBC  13.0     Temp (24hrs), Av.9 °F (38.3 °C), Min:100 °F (37.8 °C), Max:101.8 °F (38.8 °C)    Creatinine Clearance (Creatinine Clearance (ml/min)): 44.6 ml/min     Regimen assessment: new consult   Maintenance dose: 750 mg IV q24h (~predicted 15.52 mcg/dl)  Next scheduled level:  prior to 1300 dose       Pharmacy will follow daily and adjust medications as appropriate for renal function and/or serum levels.     Thank you,  Sangita Mark

## 2017-11-17 NOTE — ED TRIAGE NOTES
The Sepsis Screening has been completed on arrival in the Emergency Department.     Vital signs:  Patient Vitals for the past 4 hrs:   Pulse Resp BP   11/17/17 1900 90 17 98/46   11/17/17 1815 70 14 95/51   11/17/17 1745 73 15 107/49   11/17/17 1730 86 18 114/45   11/17/17 1630 80 19 114/41   11/17/17 1615 83 12 131/60   11/17/17 1600 79 25 118/55   11/17/17 1545 82 13 121/54   11/17/17 1530 82 13 110/50   11/17/17 1515 79 12 112/52       MAP (Monitor): (!) 58    =monitored (data validate)  MAP (Calculated): 66    =calculated (manual entry)    Is patient is 29y/o or older, meets 2 or more ABNORMAL VITAL SIGNS below, associated with SUSPECTED INFECTION?  YES     Temperature < 96.8°F (36°C) or > 100.9°F (38.3°C)   Heart Rate > 90 beats per minute   Respiratory Rate > 20 beats per minute   BP < 90 systolic    MAP < 65    IF ANSWER IS YES, CALL A CODE SEPSIS  POC LACTIC ACID ASAP AND BEGIN NURSE DRIVEN SEPSIS ORDERS WHILE AWAITING PROVIDER

## 2017-11-17 NOTE — H&P
History and Physical    Patient: Mickey Duval               Sex: male          DOA: 11/17/2017       YOB: 1950      Age:  78 y.o.        LOS:  LOS: 0 days        HPI:     Mickey Duval is a 78 y.o. male who was  Admitted to the hospital because of fever with stage 4 ischemic ulcers in the dorsum of both feet he does have a h/o peripheral artery disease and is followed by vascular surgery in [facility] . The pt lives in a nursing home and he was brought to the er because of a change in his mental status as well as  A fever . there were no reports of chest pain . The ptr has a h/o CAD and is s/p mi in the past he has a h/o dvt and is anticoagulated with plavix as well as with eliquis at 2.5 mg po bid the pt has TAD on CKD3  His creatinine is 2.40 today . He is dehydrated  With a bun of 44 he has a troponin vof 1,23 his baseline done earlier in the month was 0.02 . Pt denies chest pain he does have t wave inversion in the inferior leads . will ask cardiology to se the pt . Pt has a long h/o cigarette smoking and has copd . He is s/p cva in the past . He also has htn he c/p hip pain which is chronic and he is on a fentanyl patch for this . He has bph and is on proscar . his chest x ray is clear . Will gently rehydrate the pt overnight . When the pt's socks were removed he had ischemic ulcers in the heel of the right foot and he had  A stage 4 ulcer in the superior aspect of both feet and the tendons were visible .     Past Medical History:   Diagnosis Date    CAD (coronary artery disease)     [facility]     [facility]     Diabetes [facility]     Hypertension     Ill-defined condition     hip pain/infection    Incomplete bladder emptying     MI, old     Pneumonia     Stroke (cerebrum) ([facility]     Urinary frequency     Weak urinary stream        Social History:   Tobacco use: smoked one pack daily for many years    Alcohol use:h/o alcohol dependence in the past    Occupation:none    Family History:ravi had pancreatic cancer N/A had heart surgery       Review of Systems    Constitutional:  No fever or weight loss  HEENT:  No headache or visual changes  Cardiovascular: had diaphoresis  Respiratory:  No coughing, wheezing, or shortness of breath. GI:  No nausea or vomitting. No diarrhea  :  No hematuria or dysuria  Skin:  Ulcers in the feet and in the right heel  Neuro:  No seizures or syncope  Hematological:  No bruising or bleeding  Endocrine:  No diabetes or thyroid disease    Physical Exam:      Visit Vitals    /72    Pulse 89    Temp (!) 101.8 °F (38.8 °C)    Resp 18    Ht 5' 9\" (1.753 m)    Wt 70.3 kg (155 lb)    SpO2 96%    BMI 22.89 kg/m2       Physical Exam:    Gen:pt is alert and able to give a history . denies chest pain or sob  HEENT:  Normal cephalic atraumatic, extra-occular movements are intact.   Neck:  Supple, No JVD  Lungs:  Clear bilaterally, no wheeze, no rales,  Heart:  Regular Rate and Rhythm, normal S1 and S2,   Abdomen:  Soft, non tender, normal bowel sounds,   Extremities:  haqs ulcers in the superior aspect of the feet with tendons visible .has a necrotic heel on the right fooy  Neurological:  Awake and alert, CN's are intact, normal strength throughout extremities  Skin:  No rashes or moles  Mental Status:  Normal thought process,    Laboratory Studies:    BMP:   Lab Results   Component Value Date/Time     11/17/2017 01:17 PM    K 5.4 11/17/2017 01:17 PM     11/17/2017 01:17 PM    CO2 27 11/17/2017 01:17 PM    AGAP 7 11/17/2017 01:17 PM     (H) 11/17/2017 01:17 PM    BUN 44 (H) 11/17/2017 01:17 PM    CREA 2.40 (H) 11/17/2017 01:17 PM    GFRAA 33 (L) 11/17/2017 01:17 PM    GFRNA 27 (L) 11/17/2017 01:17 PM     CMP:   Lab Results   Component Value Date/Time     11/17/2017 01:17 PM    K 5.4 11/17/2017 01:17 PM     11/17/2017 01:17 PM    CO2 27 11/17/2017 01:17 PM    AGAP 7 11/17/2017 01:17 PM     (H) 11/17/2017 01:17 PM    BUN 44 (H) 11/17/2017 01:17 PM    CREA 2.40 (H) 11/17/2017 01:17 PM    GFRAA 33 (L) 11/17/2017 01:17 PM    GFRNA 27 (L) 11/17/2017 01:17 PM    CA 8.6 11/17/2017 01:17 PM    ALB 2.2 (L) 11/17/2017 01:17 PM    TP 7.1 11/17/2017 01:17 PM    GLOB 4.9 (H) 11/17/2017 01:17 PM    AGRAT 0.4 (L) 11/17/2017 01:17 PM    SGOT 14 (L) 11/17/2017 01:17 PM    ALT 11 (L) 11/17/2017 01:17 PM     CBC:   Lab Results   Component Value Date/Time    WBC 13.0 11/17/2017 01:17 PM    HGB 8.6 (L) 11/17/2017 01:17 PM    HCT 26.7 (L) 11/17/2017 01:17 PM     11/17/2017 01:17 PM       Assessment/Plan     Active Problems:   stage 4 ulcers in the superior aspect of the feet with tendons visible . there is a large necrotic area in the right heel   Fever . This is from the foot ulcers coronary artery disease . The pt is s/p mi in the past his troponin is presently 1.23 . R/o NSTEMI .will ask cardiology to see the pt . Copd   htn   Diabetes mellitus  Type 2 . TAD on CKD 3  Dehydration . bph   S/p dvt the patient is on eliquis . He is also on plavix for his cad . Peripheral artery disease   S/p cva        PLAN:willtrend the troponins . will rehydrate gently overnight ,.  Cardiology will be asked to see the pt podiatry will see the pt in the am  .

## 2017-11-18 ENCOUNTER — APPOINTMENT (OUTPATIENT)
Dept: GENERAL RADIOLOGY | Age: 67
DRG: 270 | End: 2017-11-18
Attending: HOSPITALIST
Payer: MEDICARE

## 2017-11-18 LAB
ALBUMIN SERPL-MCNC: 1.8 G/DL (ref 3.4–5)
ALBUMIN/GLOB SERPL: 0.5 {RATIO} (ref 0.8–1.7)
ALP SERPL-CCNC: 69 U/L (ref 45–117)
ALT SERPL-CCNC: 11 U/L (ref 16–61)
ANION GAP SERPL CALC-SCNC: 7 MMOL/L (ref 3–18)
AST SERPL-CCNC: 14 U/L (ref 15–37)
BASOPHILS # BLD: 0 K/UL (ref 0–0.06)
BASOPHILS NFR BLD: 0 % (ref 0–2)
BILIRUB SERPL-MCNC: 0.3 MG/DL (ref 0.2–1)
BUN SERPL-MCNC: 49 MG/DL (ref 7–18)
BUN/CREAT SERPL: 20 (ref 12–20)
CALCIUM SERPL-MCNC: 7.4 MG/DL (ref 8.5–10.1)
CHLORIDE SERPL-SCNC: 107 MMOL/L (ref 100–108)
CK MB CFR SERPL CALC: 3 % (ref 0–4)
CK MB CFR SERPL CALC: 3.1 % (ref 0–4)
CK MB SERPL-MCNC: 3.9 NG/ML (ref 5–25)
CK MB SERPL-MCNC: 4.4 NG/ML (ref 5–25)
CK SERPL-CCNC: 125 U/L (ref 39–308)
CK SERPL-CCNC: 145 U/L (ref 39–308)
CO2 SERPL-SCNC: 24 MMOL/L (ref 21–32)
CREAT SERPL-MCNC: 2.47 MG/DL (ref 0.6–1.3)
DIFFERENTIAL METHOD BLD: ABNORMAL
EOSINOPHIL # BLD: 0.2 K/UL (ref 0–0.4)
EOSINOPHIL NFR BLD: 1 % (ref 0–5)
ERYTHROCYTE [DISTWIDTH] IN BLOOD BY AUTOMATED COUNT: 14.3 % (ref 11.6–14.5)
ERYTHROCYTE [SEDIMENTATION RATE] IN BLOOD: >140 MM/HR (ref 0–20)
GLOBULIN SER CALC-MCNC: 3.4 G/DL (ref 2–4)
GLUCOSE BLD STRIP.AUTO-MCNC: 101 MG/DL (ref 70–110)
GLUCOSE BLD STRIP.AUTO-MCNC: 169 MG/DL (ref 70–110)
GLUCOSE BLD STRIP.AUTO-MCNC: 176 MG/DL (ref 70–110)
GLUCOSE BLD STRIP.AUTO-MCNC: 189 MG/DL (ref 70–110)
GLUCOSE SERPL-MCNC: 112 MG/DL (ref 74–99)
HCT VFR BLD AUTO: 22 % (ref 36–48)
HGB BLD-MCNC: 7 G/DL (ref 13–16)
LACTATE BLD-SCNC: 0.6 MMOL/L (ref 0.4–2)
LYMPHOCYTES # BLD: 1.4 K/UL (ref 0.9–3.6)
LYMPHOCYTES NFR BLD: 10 % (ref 21–52)
MCH RBC QN AUTO: 29.3 PG (ref 24–34)
MCHC RBC AUTO-ENTMCNC: 31.8 G/DL (ref 31–37)
MCV RBC AUTO: 92.1 FL (ref 74–97)
MONOCYTES # BLD: 0.6 K/UL (ref 0.05–1.2)
MONOCYTES NFR BLD: 4 % (ref 3–10)
NEUTS SEG # BLD: 12.1 K/UL (ref 1.8–8)
NEUTS SEG NFR BLD: 85 % (ref 40–73)
PLATELET # BLD AUTO: 329 K/UL (ref 135–420)
PMV BLD AUTO: 9.5 FL (ref 9.2–11.8)
POTASSIUM SERPL-SCNC: 6 MMOL/L (ref 3.5–5.5)
PROT SERPL-MCNC: 5.2 G/DL (ref 6.4–8.2)
RBC # BLD AUTO: 2.39 M/UL (ref 4.7–5.5)
SODIUM SERPL-SCNC: 138 MMOL/L (ref 136–145)
TROPONIN I SERPL-MCNC: 0.61 NG/ML (ref 0–0.04)
TROPONIN I SERPL-MCNC: 0.67 NG/ML (ref 0–0.04)
TSH SERPL DL<=0.05 MIU/L-ACNC: 0.45 UIU/ML (ref 0.36–3.74)
WBC # BLD AUTO: 14.3 K/UL (ref 4.6–13.2)

## 2017-11-18 PROCEDURE — 74011636637 HC RX REV CODE- 636/637: Performed by: HOSPITALIST

## 2017-11-18 PROCEDURE — 74011250637 HC RX REV CODE- 250/637: Performed by: HOSPITALIST

## 2017-11-18 PROCEDURE — 74011250637 HC RX REV CODE- 250/637: Performed by: INTERNAL MEDICINE

## 2017-11-18 PROCEDURE — 84443 ASSAY THYROID STIM HORMONE: CPT | Performed by: HOSPITALIST

## 2017-11-18 PROCEDURE — 71010 XR CHEST PORT: CPT

## 2017-11-18 PROCEDURE — 36415 COLL VENOUS BLD VENIPUNCTURE: CPT | Performed by: PODIATRIST

## 2017-11-18 PROCEDURE — 82962 GLUCOSE BLOOD TEST: CPT

## 2017-11-18 PROCEDURE — 85652 RBC SED RATE AUTOMATED: CPT | Performed by: PODIATRIST

## 2017-11-18 PROCEDURE — 82550 ASSAY OF CK (CPK): CPT | Performed by: HOSPITALIST

## 2017-11-18 PROCEDURE — 80053 COMPREHEN METABOLIC PANEL: CPT | Performed by: HOSPITALIST

## 2017-11-18 PROCEDURE — 85025 COMPLETE CBC W/AUTO DIFF WBC: CPT | Performed by: HOSPITALIST

## 2017-11-18 PROCEDURE — 65660000000 HC RM CCU STEPDOWN

## 2017-11-18 PROCEDURE — 74011250636 HC RX REV CODE- 250/636: Performed by: HOSPITALIST

## 2017-11-18 RX ORDER — FUROSEMIDE 10 MG/ML
20 INJECTION INTRAMUSCULAR; INTRAVENOUS 2 TIMES DAILY
Status: DISCONTINUED | OUTPATIENT
Start: 2017-11-18 | End: 2017-11-27 | Stop reason: HOSPADM

## 2017-11-18 RX ORDER — INSULIN LISPRO 100 [IU]/ML
INJECTION, SOLUTION INTRAVENOUS; SUBCUTANEOUS
Status: DISCONTINUED | OUTPATIENT
Start: 2017-11-18 | End: 2017-11-27 | Stop reason: HOSPADM

## 2017-11-18 RX ORDER — SODIUM POLYSTYRENE SULFONATE 15 G/60ML
15 SUSPENSION ORAL; RECTAL 2 TIMES DAILY
Status: COMPLETED | OUTPATIENT
Start: 2017-11-18 | End: 2017-11-19

## 2017-11-18 RX ORDER — METOPROLOL TARTRATE 25 MG/1
12.5 TABLET, FILM COATED ORAL EVERY 12 HOURS
Status: DISCONTINUED | OUTPATIENT
Start: 2017-11-18 | End: 2017-11-19

## 2017-11-18 RX ADMIN — INSULIN LISPRO 2 UNITS: 100 INJECTION, SOLUTION INTRAVENOUS; SUBCUTANEOUS at 18:28

## 2017-11-18 RX ADMIN — METOPROLOL TARTRATE 12.5 MG: 25 TABLET, FILM COATED ORAL at 12:34

## 2017-11-18 RX ADMIN — INSULIN LISPRO 2 UNITS: 100 INJECTION, SOLUTION INTRAVENOUS; SUBCUTANEOUS at 13:38

## 2017-11-18 RX ADMIN — AMLODIPINE BESYLATE 10 MG: 10 TABLET ORAL at 08:55

## 2017-11-18 RX ADMIN — ASPIRIN 81 MG: 81 TABLET, COATED ORAL at 08:58

## 2017-11-18 RX ADMIN — GABAPENTIN 400 MG: 400 CAPSULE ORAL at 15:39

## 2017-11-18 RX ADMIN — LOSARTAN POTASSIUM 100 MG: 50 TABLET ORAL at 09:00

## 2017-11-18 RX ADMIN — SODIUM BICARBONATE 650 MG TABLET 650 MG: at 08:55

## 2017-11-18 RX ADMIN — SODIUM POLYSTYRENE SULFONATE 15 G: 15 SUSPENSION ORAL; RECTAL at 15:40

## 2017-11-18 RX ADMIN — FUROSEMIDE 20 MG: 10 INJECTION, SOLUTION INTRAMUSCULAR; INTRAVENOUS at 12:33

## 2017-11-18 RX ADMIN — FINASTERIDE 5 MG: 5 TABLET, FILM COATED ORAL at 09:47

## 2017-11-18 RX ADMIN — GUAIFENESIN 600 MG: 600 TABLET, EXTENDED RELEASE ORAL at 18:31

## 2017-11-18 RX ADMIN — TAMSULOSIN HYDROCHLORIDE 0.4 MG: 0.4 CAPSULE ORAL at 08:55

## 2017-11-18 RX ADMIN — HYDROCODONE BITARTRATE AND ACETAMINOPHEN 1 TABLET: 5; 325 TABLET ORAL at 08:58

## 2017-11-18 RX ADMIN — SODIUM BICARBONATE 650 MG TABLET 650 MG: at 12:33

## 2017-11-18 RX ADMIN — CLOPIDOGREL BISULFATE 75 MG: 75 TABLET ORAL at 08:55

## 2017-11-18 RX ADMIN — APIXABAN 2.5 MG: 2.5 TABLET, FILM COATED ORAL at 18:31

## 2017-11-18 RX ADMIN — ATORVASTATIN CALCIUM 80 MG: 40 TABLET, FILM COATED ORAL at 09:00

## 2017-11-18 RX ADMIN — FUROSEMIDE 20 MG: 10 INJECTION, SOLUTION INTRAMUSCULAR; INTRAVENOUS at 18:32

## 2017-11-18 RX ADMIN — GUAIFENESIN 600 MG: 600 TABLET, EXTENDED RELEASE ORAL at 08:55

## 2017-11-18 RX ADMIN — APIXABAN 2.5 MG: 2.5 TABLET, FILM COATED ORAL at 08:59

## 2017-11-18 RX ADMIN — SODIUM BICARBONATE 650 MG TABLET 650 MG: at 18:31

## 2017-11-18 RX ADMIN — GABAPENTIN 400 MG: 400 CAPSULE ORAL at 08:58

## 2017-11-18 RX ADMIN — SODIUM CHLORIDE 750 MG: 900 INJECTION, SOLUTION INTRAVENOUS at 16:27

## 2017-11-18 RX ADMIN — MIRABEGRON 25 MG: 25 TABLET, FILM COATED, EXTENDED RELEASE ORAL at 09:47

## 2017-11-18 RX ADMIN — DULOXETINE 60 MG: 60 CAPSULE, DELAYED RELEASE ORAL at 08:57

## 2017-11-18 NOTE — PROGRESS NOTES
Patient awake in bed A&O x 4. Complained of pain. No distress noted. Frequently used items within reach. Bed locked in lowest position. Call bell within reach and patient verbalized understanding to use call bell for any needs or assistance.

## 2017-11-18 NOTE — CONSULTS
[facility] - Consult Note    Consultation request by Lacey Navarro MD for advice/opinion related to evaluating Pain of foot due to ischemia when walking [facility])    Date of  Admission: 11/17/2017 12:56 PM   Primary Care Physician:  Shahnaz Salinas MD     Assessment:     - Elevated troponin: in setting of physiologic stress, Fevers, TAD and some sort of infection. No CP. Doubt true angina or ACS at this time. - Fever, Leukocytosis: SIRS  - CAD s/p multiple stents to RCA , Most recent Cath 4/7/12 (Dr. Gudelia Tinoco) 3 vessel CAD with nonciritcal stenosis in the LAD and Cx with total occlusion of the very proximal RCA site of prior stenting, repeat stenting of RCA  - HTN  - HLD  - DM  - COPD  - PAD  - Tobacco abuse  - DVT  - H/O Non-compliance per previous chart and records     Plan:     Admitted with Fever, Altered mental status and also TAD  Incidental finding of elevated troponin with normal CK  Doubt true ACS but can not rule out completely  Already on DAPT and Apixaban  Enzyme trending down already and no complaint of CP  Would continue that  Continue amlodipine  Use BB , low dose as tolerated by BP for CAD  ECHO  Further plan per hospital course  Will follow. Thank you     History of Present Illness: This is a 78 y.o. male admitted for Pain of foot due to ischemia when walking ([address] Patient complains of:    Patient not sure why he is in  Hospital.    78year old male with PVD, CAD, coronary stents, Ulcers and other medical problems. The pt lives in a nursing home and he was brought to the er because of a change in his mental status as well as  A fever . Patient states he is not sure why he was brought in hospital.   There were no reports of chest pain . Cardiology involved because of elevated troponin. Patient poor informant. Does not rememebr why he is in hospital. States has been having fever lately and sweats.    No CP or SOB currently  Does not recall having any CP  States he has bi/l Hip pain and radiating down,and wants pain medicine for that. Cardiac risk factors: none      Review of Symptoms:  Except as stated above include:  Constitutional:  negative  Respiratory:  negative  Cardiovascular:  negative  Gastrointestinal: negative  Genitourinary:  negative  Musculoskeletal:  Negative  Neurological:  Negative  Dermatological:  Negative  Endocrinological: Negative  Psychological:  Negative    A comprehensive review of systems was negative except for that written in the HPI. Past Medical History:     Past Medical History:   Diagnosis Date    CAD (coronary artery disease)     S/P RCA stents in past    [facility]     [facility]     Hypertension     Incomplete bladder emptying     Pneumonia     Stroke (cerebrum) ([facility]          Social History:     Social History     Social History    Marital status: SINGLE     Spouse name: N/A    Number of children: N/A    Years of education: N/A     Social History Main Topics    Smoking status: Current Every Day Smoker     Packs/day: 1.00    Smokeless tobacco: Not on file    Alcohol use 3.6 oz/week     6 Cans of beer per week    Drug use: Not on file    Sexual activity: Not on file     Other Topics Concern    Not on file     Social History Narrative        Family History:     Family History   Problem Relation Age of Onset    Cancer Father      Pancreatic        Medications:      Allergies   Allergen Reactions    Biaxin [Clarithromycin] Rash        Current Facility-Administered Medications   Medication Dose Route Frequency    influenza vaccine 2017-18 (3 yrs+)(PF) (FLUZONE QUAD/FLUARIX QUAD) injection 0.5 mL  0.5 mL IntraMUSCular PRIOR TO DISCHARGE    furosemide (LASIX) injection 20 mg  20 mg IntraVENous BID    sodium chloride (NS) flush 5-10 mL  5-10 mL IntraVENous PRN    levoFLOXacin (LEVAQUIN) 750 mg in D5W IVPB  750 mg IntraVENous Q48H    vancomycin (VANCOCIN) 750 mg in 0.9% sodium chloride (MBP/ADV) 250 mL ADV  750 mg IntraVENous Q24H    [START ON 11/20/2017] VANCOMYCIN INFORMATION NOTE   Other ONCE    amLODIPine (NORVASC) tablet 10 mg  10 mg Oral DAILY    apixaban (ELIQUIS) tablet 2.5 mg  2.5 mg Oral BID    aspirin delayed-release tablet 81 mg  81 mg Oral DAILY    atorvastatin (LIPITOR) tablet 80 mg  80 mg Oral DAILY    DULoxetine (CYMBALTA) capsule 60 mg  60 mg Oral DAILY    finasteride (PROSCAR) tablet 5 mg  5 mg Oral DAILY    gabapentin (NEURONTIN) capsule 400 mg  400 mg Oral TID    guaiFENesin ER (MUCINEX) tablet 600 mg  600 mg Oral BID    losartan (COZAAR) tablet 100 mg  100 mg Oral DAILY    magnesium hydroxide (MILK OF MAGNESIA) 400 mg/5 mL oral suspension 30 mL  30 mL Oral DAILY PRN    mirabegron ER (MYRBETRIQ) tablet 25 mg  25 mg Oral DAILY    nitroglycerin (NITROSTAT) tablet 0.4 mg  0.4 mg SubLINGual Q5MIN PRN    tamsulosin (FLOMAX) capsule 0.4 mg  0.4 mg Oral DAILY    acetaminophen (TYLENOL) tablet 650 mg  650 mg Oral Q4H PRN    HYDROcodone-acetaminophen (NORCO) 5-325 mg per tablet 1 Tab  1 Tab Oral Q4H PRN    ondansetron (ZOFRAN) injection 4 mg  4 mg IntraVENous Q4H PRN    clopidogrel (PLAVIX) tablet 75 mg  75 mg Oral DAILY    fentaNYL (DURAGESIC) 12 mcg/hr patch 1 Patch  1 Patch TransDERmal Q72H    sodium bicarbonate tablet 650 mg  650 mg Oral QID         Physical Exam:     Visit Vitals    /47 (BP Patient Position: At rest)    Pulse 82    Temp 98.1 °F (36.7 °C)    Resp 16    Ht 5' 9\" (1.753 m)    Wt 155 lb (70.3 kg)    SpO2 92%    BMI 22.89 kg/m2     BP Readings from Last 3 Encounters:   11/18/17 138/47   11/06/17 155/63   08/08/17 120/60     Pulse Readings from Last 3 Encounters:   11/18/17 82   11/06/17 70   05/16/16 65     Wt Readings from Last 3 Encounters:   11/17/17 155 lb (70.3 kg)   11/06/17 155 lb (70.3 kg)   08/08/17 179 lb (81.2 kg)       General:  alert, fatigued, no distress, appears stated age  Neck:  no JVD  Lungs:  clear to auscultation bilaterally  Heart:  regular rate and rhythm, S1, S2 normal, no murmur, click, rub or gallop  Abdomen:  abdomen is soft without significant tenderness, masses, organomegaly or guarding  Extremities:  Extremities no edema but has b/l heel and it has dressings  Skin: Warm and dry. no hyperpigmentation, vitiligo, or suspicious lesions  Neuro: alert, oriented x2, not oriented to time yet.    Psych: non focal     Data Review:     Recent Labs      11/18/17   0606  11/17/17   1317   WBC  14.3*  13.0   HGB  7.0*  8.6*   HCT  22.0*  26.7*   PLT  329  332     Recent Labs      11/18/17   0606  11/17/17   1317   NA  138  138   K  6.0*  5.4   CL  107  104   CO2  24  27   GLU  112*  136*   BUN  49*  44*   CREA  2.47*  2.40*   CA  7.4*  8.6   ALB  1.8*  2.2*   SGOT  14*  14*   ALT  11*  11*       Results for orders placed or performed during the hospital encounter of 11/17/17   EKG, 12 LEAD, INITIAL   Result Value Ref Range    Ventricular Rate 86 BPM    Atrial Rate 86 BPM    P-R Interval 140 ms    QRS Duration 82 ms    Q-T Interval 370 ms    QTC Calculation (Bezet) 442 ms    Calculated P Axis 4 degrees    Calculated R Axis 60 degrees    Calculated T Axis -47 degrees    Diagnosis       Normal sinus rhythm  Nonspecific T wave abnormality  Abnormal ECG  When compared with ECG of 06-NOV-2017 07:38,  premature atrial complexes are no longer present  T wave inversion now evident in Inferior leads  Nonspecific T wave abnormality now evident in Anterolateral leads  Confirmed by Jayla Choe ([id number]) on 11/17/2017 4:29:20 PM         All Cardiac Markers in the last 24 hours:    Lab Results   Component Value Date/Time     11/18/2017 06:06 AM     11/18/2017 01:53 AM     11/17/2017 07:30 PM    CKMB 3.9 (H) 11/18/2017 06:06 AM    CKMB 4.4 (H) 11/18/2017 01:53 AM    CKMB 3.8 (H) 11/17/2017 07:30 PM    CKND1 3.1 11/18/2017 06:06 AM    CKND1 3.0 11/18/2017 01:53 AM    CKND1 2.3 11/17/2017 07:30 PM    TROIQ 0.61 (H) 11/18/2017 06:06 AM    TROIQ 0.67 (H) 11/18/2017 01:53 AM    TROIQ 0.94 (H) 11/17/2017 07:30 PM    TROIQ 1.23 (H) 11/17/2017 01:17 PM       Last Lipid:    Lab Results   Component Value Date/Time    Cholesterol, total 178 05/15/2016 03:28 AM    HDL Cholesterol 36 05/15/2016 03:28 AM    LDL, calculated 88 05/15/2016 03:28 AM    Triglyceride 269 05/15/2016 03:28 AM    CHOL/HDL Ratio 4.9 05/15/2016 03:28 AM       Signed By: La Hinson MD     November 18, 2017

## 2017-11-18 NOTE — PROGRESS NOTES
Problem: Falls - Risk of  Goal: *Absence of Falls  Document Alban Fall Risk and appropriate interventions in the flowsheet.    Outcome: Progressing Towards Goal  Fall Risk Interventions:                 Elimination Interventions: Call light in reach

## 2017-11-18 NOTE — PROGRESS NOTES
Progress Note      Patient: Marquis Yao               Sex: male          DOA: 11/17/2017       YOB: 1950      Age:  78 y.o.        LOS:  LOS: 1 day               Subjective:   Help of cardiology appreciated . The pt has several cardiac stents in place and his troponins are elevated he is relatively asymptomatic despite his cad he has ongoing tobacco dependence  and has copd . he is afebrile and  His creatinine is 2.42  with a potassium of 6.0   Will give the pt some kayexalate he is on vancomycin and levaquin for the ulcers in his feet . He will be seen by podiatry . His nt probnp is elevated at 28,378. Will diurese the pt gently at this point .       Objective:      Visit Vitals    /47 (BP Patient Position: At rest)    Pulse 82    Temp 98.1 °F (36.7 °C)    Resp 16    Ht 5' 9\" (1.753 m)    Wt 70.3 kg (155 lb)    SpO2 92%    BMI 22.89 kg/m2       Physical Exam:  Pt is awake and is alert   Heart reg rate and rhythm  Lungs good breath sounds heard   Abdomen soft and nontender   Extremities ulcers in the superior aspect of the feet and a large ischemic area in the right heel   Neuro nonfocal      Lab/Data Reviewed:  BMP:   Lab Results   Component Value Date/Time     11/18/2017 06:06 AM    K 6.0 (H) 11/18/2017 06:06 AM     11/18/2017 06:06 AM    CO2 24 11/18/2017 06:06 AM    AGAP 7 11/18/2017 06:06 AM     (H) 11/18/2017 06:06 AM    BUN 49 (H) 11/18/2017 06:06 AM    CREA 2.47 (H) 11/18/2017 06:06 AM    GFRAA 32 (L) 11/18/2017 06:06 AM    GFRNA 26 (L) 11/18/2017 06:06 AM     CMP:   Lab Results   Component Value Date/Time     11/18/2017 06:06 AM    K 6.0 (H) 11/18/2017 06:06 AM     11/18/2017 06:06 AM    CO2 24 11/18/2017 06:06 AM    AGAP 7 11/18/2017 06:06 AM     (H) 11/18/2017 06:06 AM    BUN 49 (H) 11/18/2017 06:06 AM    CREA 2.47 (H) 11/18/2017 06:06 AM    GFRAA 32 (L) 11/18/2017 06:06 AM    GFRNA 26 (L) 11/18/2017 06:06 AM    CA 7.4 (L) 11/18/2017 06:06 AM    ALB 1.8 (L) 11/18/2017 06:06 AM    TP 5.2 (L) 11/18/2017 06:06 AM    GLOB 3.4 11/18/2017 06:06 AM    AGRAT 0.5 (L) 11/18/2017 06:06 AM    SGOT 14 (L) 11/18/2017 06:06 AM    ALT 11 (L) 11/18/2017 06:06 AM     CBC:   Lab Results   Component Value Date/Time    WBC 14.3 (H) 11/18/2017 06:06 AM    HGB 7.0 (L) 11/18/2017 06:06 AM    HCT 22.0 (L) 11/18/2017 06:06 AM     11/18/2017 06:06 AM           Assessment/Plan     Active Problems:    Pain of foot due to ischemia . Pt has a large ishemic area in the heel of the right foot he has ulcers -stage 4 on the superior aspect of both feet . Positive troponins sloan pt with CAD with several stents in place . chf pt has an elevated nt probnp . Anemia  Probably due to sepsis  S/p old cva   Plan: will continue with the antibiotics .  Podiatry to see the pt .will give kayexalate for k of 6.0

## 2017-11-18 NOTE — PROGRESS NOTES
0235: Pt arrived at unit. No signs of distress. Instructed to press call light if help is needed. 0830: Bedside and Verbal shift change report given to Alex Verma RN (oncoming nurse) by Linda Frey RN (offgoing nurse). Report included the following information SBAR, Kardex, Intake/Output, MAR and Recent Results.

## 2017-11-18 NOTE — ROUTINE PROCESS
Bedside and Verbal shift change report given to Uma Calhoun RN (oncoming nurse) by Ashleigh Virgen RN (offgoing nurse). Report included the following information SBAR, Kardex, Intake/Output, MAR and Recent Results.

## 2017-11-18 NOTE — PROGRESS NOTES
Assumed care of patient. Patient is sleeping. Patient does not appear to be in  Any pain or discomfort.

## 2017-11-18 NOTE — PROGRESS NOTES
Problem: Falls - Risk of  Goal: *Absence of Falls  Document Alban Fall Risk and appropriate interventions in the flowsheet.    Outcome: Progressing Towards Goal  Fall Risk Interventions:                 Elimination Interventions: Call light in reach, Patient to call for help with toileting needs

## 2017-11-18 NOTE — PROGRESS NOTES
Patient has designated his mother to participate in his/her discharge plan and to receive any needed information.      Name:   Mauricio Workman  mother             Nov 18, 2017     Address:  Phone number:

## 2017-11-18 NOTE — PROGRESS NOTES
[facility]   Discharge Planning/ Assessment    Reasons for Intervention: Interviewed patient, he agreed to share [address] discharge information with his mother, see below. Demographic information verified. He lives at [facility] and is requesting to be matched to other facilities. Placed in e discharge and [address] and matched to [facility] facilities . Discharge plan is to return to a SNF.    High Risk Criteria  [x] Yes  []No   Physician Referral  [] Yes  [x]No        Date    Nursing Referral  [] Yes  [x]No        Date    Patient/Family Request  [] Yes  [x]No        Date       Resources:    Medicare  [x] Yes  []No humana   Medicaid  [] Yes  [x]No   No Resources  [] Yes  [x]No   Private Insurance  [] Yes  [x]No    Name/Phone Number    Other  [] Yes  [x]No        (i.e. Workman's Comp)         Prior Services:    Prior Services  [x] Yes  [x]No   Home Health  [] Yes  [x]No   [facility]  [] Yes  [x]No        Number of [address] Program  [] Yes  [x]No       Meals on Wheels  [] Yes  [x]No   Office on Aging  [] Yes  [x]No   Transportation Services  [] Yes  [x]No   Nursing Home  [x] Yes  []No        Nursing Home Name [facility]  [] Yes  [x]No        [address] Name    Other       Information Source:      Information obtained from  [x] Patient  [] Parent   [] Ofe Obrien  [] Child  [] Spouse   [] Significant Other/Partner   [] Friend      [] EMS    [] Nursing Home Chart          [x] Other:chart   Chart Review  [x] Yes  []No     Family/Support System:    Patient lives with  [] Alone    [] Spouse   [] Significant Other  [] Children  [] Caretaker   [] Parent  [] Sibling     [x] Other snf       Other Support System:    Is the patient responsible for care of others  [] Yes  [x]No   Information of person caring for patient on  discharge snf   Managers financial affairs independently  [] Yes  [x]No   If no, explain: snf     Status Prior to Admission:    Mental Status  [x] Awake  [x] Alert  [x] Oriented  [] Quiet/Calm [] Lethargic/Sedated   [] Disoriented  [] Restless/Anxious  [] Combative   Personal Care  [] Dependent  [] Independent Personal Care  [x] Requires Assistance   Meal Preparation Ability  [] Independent   [] Standby Assistance   [] Minimal Assistance   [] Moderate Assistance  [] Maximum Assistance     [x] Total Assistance   Chores  [] Independent with Chores   [] N/A Nursing Home Resident   [x] Requires Assistance   Bowel/Bladder  [] Continent  [] Catheter  [x] Incontinent  [] Ostomy Self-Care    [] Urine Diversion Self-Care  [] Maximum Assistance     [] Total Assistance   Number of Persons needed for assistance    DME at home  [] Melissa Colindres Primer  [] Linda Colindres   [] Commode    [] Bathroom/Grab Bars  [] Hospital Bed  [] Nebulizer  [] Oxygen           [] Raised Toilet Seat  [] Shower Chair  [] Side Rails for Bed   [] Tub Transfer Bench   [] Elisa Hernandez  [] Sabrina Waller      [] Other:   Vendor      Treatment Presently Receiving:    Current Treatments  [] Chemotherapy  [] Dialysis  [] Insulin  [] IVAB [x] IVF   [] O2  [] PCA   [] PT   [] RT   [] Tube Feedings   [] Wound Care     Psychosocial Evaluation:    Verbalized Knowledge of Disease Process  [x] Patient  [x]Family   Coping with Disease Process  [x] Patient  [x]Family   Requires Further Counseling Coping with Disease Process  [] Patient  []Family     Identified Projected Needs:    Home Health Aid  [] Yes  [x]No   Transportation  [x] Yes  []No   Education  [] Yes  [x]No        Specific Education     Financial Counseling  [] Yes  [x]No   Inability to Care for Self/Will Require 24 hour care  [] Yes  [x]No   Pain Management  [] Yes  [x]No   Home Infusion Therapy  [] Yes  [x]No   Oxygen Therapy  [] Yes  [x]No   DME  [] Yes  [x]No   Long Term Care Placement  [] Yes  [x]No   Rehab  [x] Yes  []No   Physical Therapy  [] Yes  [x]No   Needs Anticipated At This Time  [x] Yes  []No Intra-Hospital Referral:    Home Health Liasion  [] Yes  [x]No     [] Yes  [x]No   Patient Representative  [] Yes  [x]No   Staff for Teaching Needs  [] Yes  [x]No   Specialty Teaching Needs     Diabetic Educator  [] Yes  [x]No   Referral for Diabetic Educator Needed  [] Yes  [x]No  If Yes, place order for Nutritionist or Diabetic Consult     Tentative Discharge Plan:    Home with No Services  [x] Yes  []No   Home with [address]  [] Yes  [x]No        If Yes, specify type    [address]  [] Yes  [x]No        If Yes, specify type    Meals on Wheels  [] Yes  [x]No   Office of Aging  [] Yes  [x]No   NHP  [] Yes  [x]No   Return to the Nursing Home  [x] Yes  []No   Rehab Therapy  [] Yes  [x]No   Acute Rehab  [] Yes  [x]No   Subacute Rehab  [x] Yes  []No   Private Care  [] Yes  [x]No   Substance Abuse Referral  [] Yes  [x]No   Transportation  [] Yes  [x]No   Chore Service  [] Yes  [x]No   Inpatient Hospice  [] Yes  [x]No   OP RT  [] Yes  [x] No   OP Hemo  [] Yes  [x] No   OP PT  [] Yes  [x]No   Support Group  [] Yes  [x]No   Reach to Recovery  [] Yes  [x]No   OP Oncology Clinic  [] Yes  [x]No   Clinic Appointment  [] Yes  [x]No   DME  [] Yes  [x]No   Comments    Name of D/C Planner or  Given to Patient or [address] [place] [address], RN   Phone Number [phone] [address] [place] [address]   Date Nov 18 , 2017   Time 705 am   If you are discharged home, whom do you designate to participate in your discharge plan and receive any information needed?      Enter name of [address] [place] [address] [address]  mother            Phone # of cee         Address of cee         Updated Nov 18, 2017        Patient refused to designate any           individual

## 2017-11-18 NOTE — ED NOTES
Patient readjusted in bed positioned on the right side. Mepilex placed on both heels left heel as preventative. Right heel has unstageable pressure ulcer.

## 2017-11-19 PROBLEM — I96 GANGRENE (HCC): Status: ACTIVE | Noted: 2017-11-19

## 2017-11-19 LAB
ALBUMIN SERPL-MCNC: 1.9 G/DL (ref 3.4–5)
ALBUMIN/GLOB SERPL: 0.6 {RATIO} (ref 0.8–1.7)
ALP SERPL-CCNC: 73 U/L (ref 45–117)
ALT SERPL-CCNC: 10 U/L (ref 16–61)
ANION GAP SERPL CALC-SCNC: 8 MMOL/L (ref 3–18)
AST SERPL-CCNC: 9 U/L (ref 15–37)
BASOPHILS # BLD: 0 K/UL (ref 0–0.06)
BASOPHILS NFR BLD: 0 % (ref 0–2)
BILIRUB SERPL-MCNC: 0.3 MG/DL (ref 0.2–1)
BUN SERPL-MCNC: 44 MG/DL (ref 7–18)
BUN/CREAT SERPL: 22 (ref 12–20)
CALCIUM SERPL-MCNC: 7.7 MG/DL (ref 8.5–10.1)
CHLORIDE SERPL-SCNC: 106 MMOL/L (ref 100–108)
CO2 SERPL-SCNC: 24 MMOL/L (ref 21–32)
CREAT SERPL-MCNC: 2.02 MG/DL (ref 0.6–1.3)
DIFFERENTIAL METHOD BLD: ABNORMAL
EOSINOPHIL # BLD: 0.2 K/UL (ref 0–0.4)
EOSINOPHIL NFR BLD: 2 % (ref 0–5)
ERYTHROCYTE [DISTWIDTH] IN BLOOD BY AUTOMATED COUNT: 14.2 % (ref 11.6–14.5)
GLOBULIN SER CALC-MCNC: 3.4 G/DL (ref 2–4)
GLUCOSE BLD STRIP.AUTO-MCNC: 142 MG/DL (ref 70–110)
GLUCOSE BLD STRIP.AUTO-MCNC: 152 MG/DL (ref 70–110)
GLUCOSE BLD STRIP.AUTO-MCNC: 195 MG/DL (ref 70–110)
GLUCOSE SERPL-MCNC: 140 MG/DL (ref 74–99)
HCT VFR BLD AUTO: 23.5 % (ref 36–48)
HGB BLD-MCNC: 7.6 G/DL (ref 13–16)
LYMPHOCYTES # BLD: 0.8 K/UL (ref 0.9–3.6)
LYMPHOCYTES NFR BLD: 6 % (ref 21–52)
MCH RBC QN AUTO: 29.2 PG (ref 24–34)
MCHC RBC AUTO-ENTMCNC: 32.3 G/DL (ref 31–37)
MCV RBC AUTO: 90.4 FL (ref 74–97)
MONOCYTES # BLD: 0.6 K/UL (ref 0.05–1.2)
MONOCYTES NFR BLD: 5 % (ref 3–10)
NEUTS SEG # BLD: 11.7 K/UL (ref 1.8–8)
NEUTS SEG NFR BLD: 87 % (ref 40–73)
PLATELET # BLD AUTO: 317 K/UL (ref 135–420)
PMV BLD AUTO: 8.9 FL (ref 9.2–11.8)
POTASSIUM SERPL-SCNC: 4.8 MMOL/L (ref 3.5–5.5)
PROT SERPL-MCNC: 5.3 G/DL (ref 6.4–8.2)
RBC # BLD AUTO: 2.6 M/UL (ref 4.7–5.5)
SODIUM SERPL-SCNC: 138 MMOL/L (ref 136–145)
WBC # BLD AUTO: 13.4 K/UL (ref 4.6–13.2)

## 2017-11-19 PROCEDURE — 74011250637 HC RX REV CODE- 250/637: Performed by: INTERNAL MEDICINE

## 2017-11-19 PROCEDURE — 85025 COMPLETE CBC W/AUTO DIFF WBC: CPT | Performed by: HOSPITALIST

## 2017-11-19 PROCEDURE — 65660000000 HC RM CCU STEPDOWN

## 2017-11-19 PROCEDURE — 82962 GLUCOSE BLOOD TEST: CPT

## 2017-11-19 PROCEDURE — 80053 COMPREHEN METABOLIC PANEL: CPT | Performed by: HOSPITALIST

## 2017-11-19 PROCEDURE — 77030011256 HC DRSG MEPILEX <16IN NO BORD MOLN -A

## 2017-11-19 PROCEDURE — 93925 LOWER EXTREMITY STUDY: CPT

## 2017-11-19 PROCEDURE — 74011636637 HC RX REV CODE- 636/637: Performed by: HOSPITALIST

## 2017-11-19 PROCEDURE — 74011250636 HC RX REV CODE- 250/636: Performed by: HOSPITALIST

## 2017-11-19 PROCEDURE — 93306 TTE W/DOPPLER COMPLETE: CPT

## 2017-11-19 PROCEDURE — 36415 COLL VENOUS BLD VENIPUNCTURE: CPT | Performed by: HOSPITALIST

## 2017-11-19 PROCEDURE — 74011250637 HC RX REV CODE- 250/637: Performed by: HOSPITALIST

## 2017-11-19 PROCEDURE — 74011250636 HC RX REV CODE- 250/636: Performed by: PHYSICIAN ASSISTANT

## 2017-11-19 RX ORDER — METOPROLOL TARTRATE 25 MG/1
25 TABLET, FILM COATED ORAL EVERY 12 HOURS
Status: DISCONTINUED | OUTPATIENT
Start: 2017-11-19 | End: 2017-11-27 | Stop reason: HOSPADM

## 2017-11-19 RX ORDER — FACIAL-BODY WIPES
10 EACH TOPICAL DAILY PRN
Status: DISCONTINUED | OUTPATIENT
Start: 2017-11-19 | End: 2017-11-27 | Stop reason: HOSPADM

## 2017-11-19 RX ORDER — LEVOFLOXACIN 750 MG/1
750 TABLET ORAL
Status: DISCONTINUED | OUTPATIENT
Start: 2017-11-21 | End: 2017-11-26

## 2017-11-19 RX ADMIN — INSULIN LISPRO 2 UNITS: 100 INJECTION, SOLUTION INTRAVENOUS; SUBCUTANEOUS at 13:30

## 2017-11-19 RX ADMIN — GUAIFENESIN 600 MG: 600 TABLET, EXTENDED RELEASE ORAL at 09:25

## 2017-11-19 RX ADMIN — AMLODIPINE BESYLATE 10 MG: 10 TABLET ORAL at 09:25

## 2017-11-19 RX ADMIN — SODIUM BICARBONATE 650 MG TABLET 650 MG: at 13:32

## 2017-11-19 RX ADMIN — MIRABEGRON 25 MG: 25 TABLET, FILM COATED, EXTENDED RELEASE ORAL at 10:59

## 2017-11-19 RX ADMIN — SODIUM POLYSTYRENE SULFONATE 15 G: 15 SUSPENSION ORAL; RECTAL at 16:20

## 2017-11-19 RX ADMIN — GABAPENTIN 400 MG: 400 CAPSULE ORAL at 09:26

## 2017-11-19 RX ADMIN — TAMSULOSIN HYDROCHLORIDE 0.4 MG: 0.4 CAPSULE ORAL at 09:26

## 2017-11-19 RX ADMIN — FINASTERIDE 5 MG: 5 TABLET, FILM COATED ORAL at 10:58

## 2017-11-19 RX ADMIN — CLOPIDOGREL BISULFATE 75 MG: 75 TABLET ORAL at 09:25

## 2017-11-19 RX ADMIN — FUROSEMIDE 20 MG: 10 INJECTION, SOLUTION INTRAMUSCULAR; INTRAVENOUS at 09:39

## 2017-11-19 RX ADMIN — SODIUM BICARBONATE 650 MG TABLET 650 MG: at 09:37

## 2017-11-19 RX ADMIN — SODIUM CHLORIDE 750 MG: 900 INJECTION, SOLUTION INTRAVENOUS at 16:18

## 2017-11-19 RX ADMIN — ATORVASTATIN CALCIUM 80 MG: 40 TABLET, FILM COATED ORAL at 09:25

## 2017-11-19 RX ADMIN — HYDROCODONE BITARTRATE AND ACETAMINOPHEN 1 TABLET: 5; 325 TABLET ORAL at 10:57

## 2017-11-19 RX ADMIN — LEVOFLOXACIN 750 MG: 5 INJECTION, SOLUTION INTRAVENOUS at 13:36

## 2017-11-19 RX ADMIN — SODIUM POLYSTYRENE SULFONATE 15 G: 15 SUSPENSION ORAL; RECTAL at 09:36

## 2017-11-19 RX ADMIN — ASPIRIN 81 MG: 81 TABLET, COATED ORAL at 09:25

## 2017-11-19 RX ADMIN — APIXABAN 2.5 MG: 2.5 TABLET, FILM COATED ORAL at 09:25

## 2017-11-19 RX ADMIN — DULOXETINE 60 MG: 60 CAPSULE, DELAYED RELEASE ORAL at 09:26

## 2017-11-19 RX ADMIN — LOSARTAN POTASSIUM 100 MG: 50 TABLET ORAL at 09:25

## 2017-11-19 RX ADMIN — FUROSEMIDE 20 MG: 10 INJECTION, SOLUTION INTRAMUSCULAR; INTRAVENOUS at 18:01

## 2017-11-19 RX ADMIN — METOPROLOL TARTRATE 12.5 MG: 25 TABLET, FILM COATED ORAL at 09:25

## 2017-11-19 RX ADMIN — HYDROCODONE BITARTRATE AND ACETAMINOPHEN 1 TABLET: 5; 325 TABLET ORAL at 18:01

## 2017-11-19 RX ADMIN — INSULIN LISPRO 2 UNITS: 100 INJECTION, SOLUTION INTRAVENOUS; SUBCUTANEOUS at 09:46

## 2017-11-19 NOTE — PROGRESS NOTES
Assumed care of patient. Patient is AOx4, resting in bed, in stable condition. Patient does have some hip pain but says he can wait until after shift change.

## 2017-11-19 NOTE — PROGRESS NOTES
Progress Note      Patient: Mars Trevino               Sex: male          DOA: 11/17/2017       YOB: 1950      Age:  78 y.o.        LOS:  LOS: 2 days               Subjective:   Discussed with podiatry and with vascular surgery . Kasi Allen The pt will have an angiogram on tuesday and the apixaban mary be dc'd today . Pt appears to be comfortable and is eating breakfast when seen he is lying flat with no signs of sob or chest pain . Help of cardiology appreciated . His lopressor was increased to 25 mg po bid . Pt is not on plavix presently . cardiac stent was put in place in 2012 as per cardiology . Objective:      Visit Vitals    /68 (BP 1 Location: Right arm, BP Patient Position: At rest)    Pulse 82    Temp 98.6 °F (37 °C)    Resp 18    Ht 5' 9\" (1.753 m)    Wt 70.3 kg (155 lb)    SpO2 90%    BMI 22.89 kg/m2       Physical Exam:  Pt is alert and denies pain .   Heart reg rate and rhythm  Lungs good breath sounds herd   Abdomen soft and no pain on palpation   Extremities fee4t covered with dressing     Neuro nonfocal      Lab/Data Reviewed:  BMP:   Lab Results   Component Value Date/Time     11/19/2017 07:10 AM    K 4.8 11/19/2017 07:10 AM     11/19/2017 07:10 AM    CO2 24 11/19/2017 07:10 AM    AGAP 8 11/19/2017 07:10 AM     (H) 11/19/2017 07:10 AM    BUN 44 (H) 11/19/2017 07:10 AM    CREA 2.02 (H) 11/19/2017 07:10 AM    GFRAA 40 (L) 11/19/2017 07:10 AM    GFRNA 33 (L) 11/19/2017 07:10 AM     CMP:   Lab Results   Component Value Date/Time     11/19/2017 07:10 AM    K 4.8 11/19/2017 07:10 AM     11/19/2017 07:10 AM    CO2 24 11/19/2017 07:10 AM    AGAP 8 11/19/2017 07:10 AM     (H) 11/19/2017 07:10 AM    BUN 44 (H) 11/19/2017 07:10 AM    CREA 2.02 (H) 11/19/2017 07:10 AM    GFRAA 40 (L) 11/19/2017 07:10 AM    GFRNA 33 (L) 11/19/2017 07:10 AM    CA 7.7 (L) 11/19/2017 07:10 AM    ALB 1.9 (L) 11/19/2017 07:10 AM    TP 5.3 (L) 11/19/2017 07:10 AM    GLOB 3.4 11/19/2017 07:10 AM    AGRAT 0.6 (L) 11/19/2017 07:10 AM    SGOT 9 (L) 11/19/2017 07:10 AM    ALT 10 (L) 11/19/2017 07:10 AM     CBC:   Lab Results   Component Value Date/Time    WBC 13.4 (H) 11/19/2017 07:10 AM    HGB 7.6 (L) 11/19/2017 07:10 AM    HCT 23.5 (L) 11/19/2017 07:10 AM     11/19/2017 07:10 AM           Assessment/Plan     Active Problems:    Pain of foot due to ischemia when walking ([address] (11/17/2017)      Gangrene ([address] (11/19/2017) in right heel . Stage 4 ulcers in the superior aspect of the feet bilaterally right/o occlusion of the tibialis bilaterally .    Cad the patient is s/p multiple stents to the RCA he is s/p cardiac cath on 2012  Showing 3 vessel disease    Dm  htn   H/o dvt         Plan:pt will be on aspirin only and will have an angiogram on Tuesday

## 2017-11-19 NOTE — PROGRESS NOTES
[facility]  [address] [facility] [address]  [address] [place], [place]  [address] [place] [address], [address] [place] [address]  Dr. William Luis, Dr. Serjio Parnell [place]  [phone] FAX# [phone]    Consult    Patient: Jani Phalen MRN: [id number]  SSN: [id number]    YOB: 1950  Age: 78 y.o. Sex: male      Subjective:      Jani Phalen is a 78 y.o. male who is being seen for right foot ulceration. Pt was scheduled at [place] [address] for angiogram and possible intervention, but he developed chest pain. His angio was cancelled. Pt now presents here with worsening right foot pain. Pt is poor historian and history was gleamed from the chart.     Past Medical History:   Diagnosis Date    CAD (coronary artery disease)     S/P RCA stents in past    COPD (chronic obstructive pulmonary disease) ([address]     Diabetes ([address]     Hypertension     Incomplete bladder emptying     Pneumonia     Stroke (cerebrum) ([address]      Past Surgical History:   Procedure Laterality Date    HX APPENDECTOMY  1976    HX ROTATOR CUFF REPAIR        Family History   Problem Relation Age of Onset    Cancer Father      Pancreatic     Social History   Substance Use Topics    Smoking status: Current Every Day Smoker     Packs/day: 1.00    Smokeless tobacco: Not on file    Alcohol use 3.6 oz/week     6 Cans of beer per week      Current Facility-Administered Medications   Medication Dose Route Frequency Provider Last Rate Last Dose    metoprolol tartrate (LOPRESSOR) tablet 25 mg  25 mg Oral Q12H Jose Haynes MD        influenza vaccine 2017-18 (3 yrs+)(PF) (FLUZONE QUAD/FLUARIX QUAD) injection 0.5 mL  0.5 mL IntraMUSCular PRIOR TO DISCHARGE Renetta Espinal MD        furosemide (LASIX) injection 20 mg  20 mg IntraVENous BID Renetta Espinal MD   20 mg at 11/19/17 0939    sodium polystyrene (KAYEXALATE) 15 gram/60 mL oral suspension 15 g 15 g Oral BID Camryn Celeste MD   15 g at 11/19/17 3412    insulin lispro (HUMALOG) injection   SubCUTAneous AC&HS Camryn Celeste MD   2 Units at 11/19/17 0946    vancomycin (VANCOCIN) 750 mg in 0.9% sodium chloride (MBP/ADV) 250 mL ADV  750 mg IntraVENous Q24H Camryn Celeste  mL/hr at 11/18/17 1627 750 mg at 11/18/17 1627    [START ON 11/20/2017] VANCOMYCIN INFORMATION NOTE   Other ONCE Camryn Celeste MD        sodium chloride (NS) flush 5-10 mL  5-10 mL IntraVENous PRN IDANIA Wright        levoFLOXacin (LEVAQUIN) 750 mg in D5W IVPB  750 mg IntraVENous Q48H IDANIA Wright   Stopped at 11/17/17 1500    amLODIPine (NORVASC) tablet 10 mg  10 mg Oral DAILY Camryn Celeste MD   10 mg at 11/19/17 8774    apixaban (ELIQUIS) tablet 2.5 mg  2.5 mg Oral BID Camryn Celeste MD   2.5 mg at 11/19/17 2486    aspirin delayed-release tablet 81 mg  81 mg Oral DAILY Camryn Celeste MD   81 mg at 11/19/17 4054    atorvastatin (LIPITOR) tablet 80 mg  80 mg Oral DAILY Camryn Celeste MD   80 mg at 11/19/17 9672    DULoxetine (CYMBALTA) capsule 60 mg  60 mg Oral DAILY Camryn Celeste MD   60 mg at 11/19/17 1730    finasteride (PROSCAR) tablet 5 mg  5 mg Oral DAILY Camryn Celeste MD   5 mg at 11/19/17 1058    gabapentin (NEURONTIN) capsule 400 mg  400 mg Oral TID Camryn Celeste MD   400 mg at 11/19/17 6103    guaiFENesin ER (MUCINEX) tablet 600 mg  600 mg Oral BID Camryn Celeste MD   600 mg at 11/19/17 4082    losartan (COZAAR) tablet 100 mg  100 mg Oral DAILY Camryn Celeste MD   100 mg at 11/19/17 9731    magnesium hydroxide (MILK OF MAGNESIA) 400 mg/5 mL oral suspension 30 mL  30 mL Oral DAILY PRN Camryn Celeste MD        mirabegron ER [facility]) tablet 25 mg  25 mg Oral DAILY Camryn Celeste MD   25 mg at 11/19/17 1059    nitroglycerin (NITROSTAT) tablet 0.4 mg  0.4 mg SubLINGual Q5MIN PRN Camryn Celeste MD        tamsulosin [facility]) capsule 0.4 mg  0.4 mg Oral DAILY Camryn Celeste MD   0.4 mg at 11/19/17 9665    acetaminophen (TYLENOL) tablet 650 mg  650 mg Oral Q4H PRN Alexia Avalos MD   650 mg at 11/17/17 2210    HYDROcodone-acetaminophen (NORCO) 5-325 mg per tablet 1 Tab  1 Tab Oral Q4H PRN Alexia Avalos MD   1 Tab at 11/19/17 1057    ondansetron (ZOFRAN) injection 4 mg  4 mg IntraVENous Q4H PRN Alexia Avalos MD   4 mg at 11/17/17 2206    clopidogrel (PLAVIX) tablet 75 mg  75 mg Oral DAILY Alexia Avalos MD   75 mg at 11/19/17 0925    fentaNYL (DURAGESIC) 12 mcg/hr patch 1 Patch  1 Patch TransDERmal Q72H Alexia Avalos MD   1 Patch at 11/17/17 2302    sodium bicarbonate tablet 650 mg  650 mg Oral QID Alexia Avalos MD   650 mg at 11/19/17 9792        Allergies   Allergen Reactions    Biaxin [Clarithromycin] Rash       Review of Systems:  Pertinent items are noted in the History of Present Illness. Objective:     Vitals:    11/18/17 1742 11/18/17 2255 11/19/17 0710 11/19/17 0923   BP: 122/65 129/72 167/75 161/68   Pulse: 66 74 76 82   Resp: 16 16 18 18   Temp: 97.9 °F (36.6 °C) 98 °F (36.7 °C) 98.2 °F (36.8 °C) 98.6 °F (37 °C)   SpO2:  90% 92% 90%   Weight:       Height:            Physical Exam:  GENERAL: alert, cooperative, no distress, appears older than stated age  LUNG: clear to auscultation bilaterally  HEART: regular rate and rhythm, S1, S2 normal, no murmur, click, rub or gallop  ABDOMEN: soft, non-tender. Bowel sounds normal. No masses,  no organomegaly  EXTREMITIES:  extremities normal, atraumatic, no cyanosis or edema  SKIN: ulcer noted on right foot with exposed tendons.     Arterial studies pending  Assessment:     Hospital Problems  Date Reviewed: 11/19/2017          Codes Class Noted POA    Gangrene ([facility] ICD-10-CM: W78  ICD-9-CM: 785.4  11/19/2017 Unknown        Pain of foot due to ischemia when walking [facility]) ICD-10-CM: I73.9  ICD-9-CM: 443.9  11/17/2017 Unknown              Plan:     Discontinue apixaban  Continue ASA  NPO after MN tomorrow  RLE angiogram Tuesday    I would like to thank Dr. Jami Ramachandran for this referral    Signed By: Susannah Junior MD     November 19, 2017

## 2017-11-19 NOTE — PROGRESS NOTES
Nutrition initial assessment/Plan of care      RECOMMENDATIONS:   1. Low Na (2 g) diet  2. Ensure BID (Vanilla)  3. Monitor weight and PO intake  4. RD to follow     GOALS:   1. PO intake meets >75% of protein/calorie needs by 11/24  2. Weight Maintenance (+/- 1-2 lb) by 11/26        ASSESSMENT:   Per BMI of 22.9, weight is in the normal classification. PO intake is fair. Labs noted. Nutrition recommendations listed. RD to follow. Nutrition Diagnoses:   None at this time    Nutrition Risk:  [] High  [] Moderate []  Low    SUBJECTIVE/OBJECTIVE:   Pt with Gangrene; stage 4 ulcers in the superior aspect of the feet bilaterally right/o occlusion of the tibialis bilaterally. He has h/o MI, HTN, DM, CHF, CAD, CVA, COPD. Pt lives in a nursing home. He states that he usually drinks Vanilla Ensure at the nursing home. Pt ate about 40% of his lunch; he didn't like it too much. Pt wants a hamburger for dinner. He denies N/V/D/C. Information Obtained from:    [x] Chart Review   [x] Patient   [] Family/Caregiver   [] Nurse/Physician   [] Interdisciplinary Meeting/Rounds    Diet: Cardiac  Medications: [x] Reviewed (Lasix)   Allergies: [x] Reviewed   Encounter Diagnoses     ICD-10-CM ICD-9-CM   1. Pressure ulcer of dorsum of right foot, stage 4 (HCC) L89.894 707.09     707.24   2. TAD (acute kidney injury) ([facility] N17.9 584.9   3. Dehydration E86.0 276.51   4.  Elevated troponin R74.8 790.6     Past Medical History:   Diagnosis Date    CAD (coronary artery disease)     S/P RCA stents in past    COPD (chronic obstructive pulmonary disease) ([facility]     Diabetes ([facility]     Hypertension     Incomplete bladder emptying     Pneumonia     [facility]       Labs:  Lab Results   Component Value Date/Time    Sodium 138 11/19/2017 07:10 AM    Potassium 4.8 11/19/2017 07:10 AM    Chloride 106 11/19/2017 07:10 AM    CO2 24 11/19/2017 07:10 AM    Anion gap 8 11/19/2017 07:10 AM    Glucose 140 11/19/2017 07:10 AM    BUN 44 11/19/2017 07:10 AM    Creatinine 2.02 11/19/2017 07:10 AM    Calcium 7.7 11/19/2017 07:10 AM    Magnesium 2.6 03/17/2016 05:48 AM    Phosphorus 2.8 02/02/2014 06:20 AM    Albumin 1.9 11/19/2017 07:10 AM     Anthropometrics: BMI (calculated): 22.9  Last 3 Recorded Weights in this Encounter    11/17/17 1256   Weight: 70.3 kg (155 lb)    Ht Readings from Last 1 Encounters:   11/17/17 5' 9\" (1.753 m)     Patient Vitals for the past 100 hrs:   % Diet Eaten   11/18/17 1743 50 %   11/18/17 1300 50 %   11/18/17 0900 50 %       IBW: 160 lb %IBW: 97%    Estimated Nutrition Needs: [x] MSJ  [] Other:  Calories: 6672-1743 kcal Based on:   [x] Actual BW    Protein:    g Based on:   [x] Actual BW    Fluid:       2439-0536 ml Based on:   [x] Actual BW      [x] No Cultural, Sikh or ethnic dietary need identified.     [] Cultural, Sikh and ethnic food preferences identified and addressed     Wt Status:  [x] Normal (18.6 - 24.9) [] Underweight (<18.5) [] Overweight (25 - 29.9) [] Mild Obesity (30 - 34.9)  [] Moderate Obesity (35 - 39.9) [] Morbid Obesity (40+)   [] Moderate Malnutrition [] Severe Malnutrition in the context of :     Nutrition Problems Identified:   [x] Suboptimal PO intake   [] Food Allergies  [] Difficulty chewing/swallowing/poor dentition  [] Constipation/Diarrhea   [] Nausea/Vomiting   [] None  [] Other:     Plan:   [x] Therapeutic Diet  [x]  Obtained/adjusted food preferences/tolerances and/or snacks options   [x]  Supplements added   [] Occupational therapy following for feeding techniques  []  HS snack added   []  Modify diet texture   []  Modify diet for food allergies   []  Educate patient   [x]  Assist with menu selection   [x]  Monitor PO intake on meal rounds   [x]  Continue inpatient monitoring and intervention   []  Participated in discharge planning/Interdisciplinary rounds/Team meetings   []  Other:     Education Needs:   [] Not appropriate for teaching at this time due to:   [x] Identified and addressed    Nutrition Monitoring and Evaluation:  [x] Continue ongoing monitoring and intervention  [] Other    Amita Ng RD  Pager: [phone]

## 2017-11-19 NOTE — ROUTINE PROCESS
Bedside and Verbal shift change report given to Elizabeth Fletcher (oncoming nurse) by Jose M Haney RN   (offgoing nurse). Report included the following information SBAR, Kardex, MAR and Recent Results.

## 2017-11-19 NOTE — CONSULTS
[facility] - Consult Note    Consultation request by Tyesha Gonzales MD for advice/opinion related to evaluating Pain of foot due to ischemia when walking [facility])    Date of  Admission: 11/17/2017 12:56 PM   Primary Care Physician:  Rafi Lezama MD     Assessment:     - Elevated troponin: in setting of physiologic stress, Fevers, TAD and some sort of infection. No CP. Doubt true angina or ACS at this time. - Fever, Leukocytosis: SIRS  - CAD s/p multiple stents to RCA , Most recent Cath 4/7/12 (Dr. Simona Kraft) 3 vessel CAD with nonciritcal stenosis in the LAD and Cx with total occlusion of the very proximal RCA site of prior stenting, repeat stenting of RCA  - HTN  - HLD  - DM  - COPD  - PAD  - Tobacco abuse  - DVT  - H/O Non-compliance per previous chart and records     Plan:     Admitted with Fever, Altered mental status and also TAD  Incidental finding of elevated troponin with normal CK  Doubt true ACS but can not rule out completely  Already on DAPT and Apixaban    - Continue amlodipine  - Increase metoprolol to 25 mg BID, done  - Ok to stop ASA / Plavix and keep only one antiplatelet from CV stand point as stent was in 2012 and he is already on Apixaban.  Will               EXAM:    General:  alert, fatigued, no distress, appears stated age  Neck:  no JVD  Lungs:  clear to auscultation bilaterally  Heart:  regular rate and rhythm, S1, S2 normal, no murmur, click, rub or gallop  Abdomen:  abdomen is soft without significant tenderness, masses, organomegaly or guarding  Extremities:  Extremities no edema but has b/l heel and it has dressings     Data Review:     Recent Labs      11/19/17   0710  11/18/17   0606  11/17/17   1317   WBC  13.4*  14.3*  13.0   HGB  7.6*  7.0*  8.6*   HCT  23.5*  22.0*  26.7*   PLT  317  329  332     Recent Labs      11/19/17   0710  11/18/17   0606  11/17/17   1317   NA  138  138  138   K  4.8  6.0*  5.4   CL  106  107  104   CO2  24  24  27   GLU  140*  112*  136* BUN  44*  49*  44*   CREA  2.02*  2.47*  2.40*   CA  7.7*  7.4*  8.6   ALB  1.9*  1.8*  2.2*   SGOT  9*  14*  14*   ALT  10*  11*  11*       Results for orders placed or performed during the hospital encounter of 11/17/17   EKG, 12 LEAD, INITIAL   Result Value Ref Range    Ventricular Rate 86 BPM    Atrial Rate 86 BPM    P-R Interval 140 ms    QRS Duration 82 ms    Q-T Interval 370 ms    QTC Calculation (Bezet) 442 ms    Calculated P Axis 4 degrees    Calculated R Axis 60 degrees    Calculated T Axis -47 degrees    Diagnosis       Normal sinus rhythm  Nonspecific T wave abnormality  Abnormal ECG  When compared with ECG of 06-NOV-2017 07:38,  premature atrial complexes are no longer present  T wave inversion now evident in Inferior leads  Nonspecific T wave abnormality now evident in Anterolateral leads  Confirmed by Ra Ryan ([id number]) on 11/17/2017 4:29:20 PM         All Cardiac Markers in the last 24 hours:    No results found for: CPK, CK, CKMMB, CKMB, RCK3, CKMBT, CKNDX, CKND1, PRETTY, TROPT, TROIQ, MARYLOU, TROPT, TNIPOC, BNP, BNPP    Last Lipid:    Lab Results   Component Value Date/Time    Cholesterol, total 178 05/15/2016 03:28 AM    HDL Cholesterol 36 05/15/2016 03:28 AM    LDL, calculated 88 05/15/2016 03:28 AM    Triglyceride 269 05/15/2016 03:28 AM    CHOL/HDL Ratio 4.9 05/15/2016 03:28 AM       Signed By: Kolby Hernandez MD     November 19, 2017

## 2017-11-19 NOTE — PROCEDURES
[facility]  *** FINAL REPORT ***    Name: Yoan Rowan  MRN: [id number]    Inpatient  : 12 May 1950  HIS Order #: [id number]  [id number] [facility] Visit #: [id number]  Date: 2017    TYPE OF TEST: Extremity Arterial Duplex    REASON FOR TEST  Extremity ulceration (right side)                            Right                     Left  Artery               PSV   Finding             PSV   Finding  ------------------  -----  ---------------    -----  ---------------  External iliac:  Common femoral:     157.0                     206.6  Profunda femoris:   317.6  >50% stenosis      230.4  Proximal SFA:       149.2                     110.2  Mid SFA:            164.8  >75% stenosis       96.5  >50% stenosis  Distal SFA:         510.4                     223.8  Popliteal:          100.3                     123.4  Anterior tibial:     71.0                     217.4  >75% stenosis  Posterior tibial:   117.7  Mild stenosis      150.5  >50% stenosis    Pressures               Right     Left               -----     -----     Brachial:   120       126           DP:    90       120           PT:   100       110            OSIRIS:  0.79      0.95            Toe: INTERPRETATION/FINDINGS  Duplex images were obtained using 2-D gray scale, color flow, and  spectral Doppler analysis. Duplex images were obtained in the  bilateral common femoral, superficial, profunda, popliteal, posterior  tibial, peroneal, and anteriot tibial arteries. Right le. Moderate peripheral arterial disease indicated at rest in the right   leg. 2. Disease is multi-level to include the superficial femoral, profunda   femoris and tibioperoneal segments. 3. <50% stenosis of the posterior tibial artery. 4. >50% stenosis of the profunda femoris artery. 5. >75% stenosis of the distal superficial femoral artery. 6. Plaque appears irregular along the walls.   7. A monophasic signal is demonstrated in the proximal superficial  femoral, distal superficial femoral, popliteal (ak), popliteal (bk),  anterior tibial, posterior tibial and peroneal arteries. 8. The right ankle/brachial index is 0.79. Left leg :  1. Although OSIRIS's of the left lower extremity suggest adequate  arterial perfusion distally, there is evidence of stenosis seen by  duplex imaging in the distal superficial femoral, posterior tibial and   anterior tibial ateries. 2. Plaque appears irregular along the walls. 3. >50% stenosis of the distal superficial femoral and posterior  tibial arteries. 4. >75% stenosis of the anterior tibial artery. 5. The left ankle/brachial index is 0.95. ADDITIONAL COMMENTS    I have personally reviewed the data relevant to the interpretation of  this  study.     TECHNOLOGIST: Ermelinda Ortega RDMS, RVT  Signed: 11/19/2017 12:25 PM    PHYSICIAN: Chance Silva MD  Signed: 11/20/2017 07:26 AM

## 2017-11-19 NOTE — PROGRESS NOTES
1925: Assumed pt care. Received pt resting in bed, pt stated,  pt has no s/s of distress. Call bell within reach. 11-    4154: Bedside and Verbal shift change report given to Alvino Hawkins RN (oncoming nurse) by Amari Jeff   (offgoing nurse). Report included the following information SBAR, Kardex, Intake/Output and MAR.

## 2017-11-19 NOTE — PROGRESS NOTES
Problem: Falls - Risk of  Goal: *Absence of Falls  Document Alban Fall Risk and appropriate interventions in the flowsheet.    Outcome: Progressing Towards Goal  Fall Risk Interventions:            Medication Interventions: Teach patient to arise slowly    Elimination Interventions: Call light in reach, Patient to call for help with toileting needs, Toileting schedule/hourly rounds

## 2017-11-20 LAB
ALBUMIN SERPL-MCNC: 1.8 G/DL (ref 3.4–5)
ALBUMIN/GLOB SERPL: 0.5 {RATIO} (ref 0.8–1.7)
ALP SERPL-CCNC: 74 U/L (ref 45–117)
ALT SERPL-CCNC: 9 U/L (ref 16–61)
ANION GAP SERPL CALC-SCNC: 9 MMOL/L (ref 3–18)
AST SERPL-CCNC: 10 U/L (ref 15–37)
BASOPHILS # BLD: 0 K/UL (ref 0–0.06)
BASOPHILS NFR BLD: 0 % (ref 0–2)
BILIRUB SERPL-MCNC: 0.3 MG/DL (ref 0.2–1)
BUN SERPL-MCNC: 35 MG/DL (ref 7–18)
BUN/CREAT SERPL: 21 (ref 12–20)
CALCIUM SERPL-MCNC: 7.4 MG/DL (ref 8.5–10.1)
CHLORIDE SERPL-SCNC: 105 MMOL/L (ref 100–108)
CO2 SERPL-SCNC: 26 MMOL/L (ref 21–32)
CREAT SERPL-MCNC: 1.64 MG/DL (ref 0.6–1.3)
DATE LAST DOSE: NORMAL
DIFFERENTIAL METHOD BLD: ABNORMAL
EOSINOPHIL # BLD: 0.2 K/UL (ref 0–0.4)
EOSINOPHIL NFR BLD: 2 % (ref 0–5)
ERYTHROCYTE [DISTWIDTH] IN BLOOD BY AUTOMATED COUNT: 14 % (ref 11.6–14.5)
GLOBULIN SER CALC-MCNC: 3.3 G/DL (ref 2–4)
GLUCOSE BLD STRIP.AUTO-MCNC: 163 MG/DL (ref 70–110)
GLUCOSE BLD STRIP.AUTO-MCNC: 171 MG/DL (ref 70–110)
GLUCOSE BLD STRIP.AUTO-MCNC: 223 MG/DL (ref 70–110)
GLUCOSE SERPL-MCNC: 132 MG/DL (ref 74–99)
HCT VFR BLD AUTO: 25.4 % (ref 36–48)
HGB BLD-MCNC: 8.1 G/DL (ref 13–16)
LYMPHOCYTES # BLD: 1 K/UL (ref 0.9–3.6)
LYMPHOCYTES NFR BLD: 8 % (ref 21–52)
MCH RBC QN AUTO: 28.7 PG (ref 24–34)
MCHC RBC AUTO-ENTMCNC: 31.9 G/DL (ref 31–37)
MCV RBC AUTO: 90.1 FL (ref 74–97)
MONOCYTES # BLD: 0.6 K/UL (ref 0.05–1.2)
MONOCYTES NFR BLD: 5 % (ref 3–10)
NEUTS SEG # BLD: 9.9 K/UL (ref 1.8–8)
NEUTS SEG NFR BLD: 85 % (ref 40–73)
PLATELET # BLD AUTO: 378 K/UL (ref 135–420)
PMV BLD AUTO: 9.3 FL (ref 9.2–11.8)
POTASSIUM SERPL-SCNC: 3.9 MMOL/L (ref 3.5–5.5)
PROT SERPL-MCNC: 5.1 G/DL (ref 6.4–8.2)
RBC # BLD AUTO: 2.82 M/UL (ref 4.7–5.5)
REPORTED DOSE,DOSE: NORMAL UNITS
REPORTED DOSE/TIME,TMG: 1300
SODIUM SERPL-SCNC: 140 MMOL/L (ref 136–145)
VANCOMYCIN TROUGH SERPL-MCNC: 15 UG/ML (ref 10–20)
WBC # BLD AUTO: 11.7 K/UL (ref 4.6–13.2)

## 2017-11-20 PROCEDURE — 85025 COMPLETE CBC W/AUTO DIFF WBC: CPT | Performed by: HOSPITALIST

## 2017-11-20 PROCEDURE — 80053 COMPREHEN METABOLIC PANEL: CPT | Performed by: HOSPITALIST

## 2017-11-20 PROCEDURE — 74011636637 HC RX REV CODE- 636/637: Performed by: HOSPITALIST

## 2017-11-20 PROCEDURE — 74011250637 HC RX REV CODE- 250/637: Performed by: INTERNAL MEDICINE

## 2017-11-20 PROCEDURE — 74011250636 HC RX REV CODE- 250/636: Performed by: HOSPITALIST

## 2017-11-20 PROCEDURE — 36415 COLL VENOUS BLD VENIPUNCTURE: CPT | Performed by: HOSPITALIST

## 2017-11-20 PROCEDURE — 77030011256 HC DRSG MEPILEX <16IN NO BORD MOLN -A

## 2017-11-20 PROCEDURE — 74011250636 HC RX REV CODE- 250/636: Performed by: INTERNAL MEDICINE

## 2017-11-20 PROCEDURE — 80202 ASSAY OF VANCOMYCIN: CPT | Performed by: HOSPITALIST

## 2017-11-20 PROCEDURE — 77030020263 HC SOL INJ SOD CL0.9% LFCR 1000ML

## 2017-11-20 PROCEDURE — 65660000000 HC RM CCU STEPDOWN

## 2017-11-20 PROCEDURE — 93005 ELECTROCARDIOGRAM TRACING: CPT

## 2017-11-20 PROCEDURE — 74011250637 HC RX REV CODE- 250/637: Performed by: HOSPITALIST

## 2017-11-20 PROCEDURE — 77030018836 HC SOL IRR NACL ICUM -A

## 2017-11-20 PROCEDURE — 82962 GLUCOSE BLOOD TEST: CPT

## 2017-11-20 RX ORDER — HYDROCODONE BITARTRATE AND ACETAMINOPHEN 5; 325 MG/1; MG/1
2 TABLET ORAL
Status: DISCONTINUED | OUTPATIENT
Start: 2017-11-20 | End: 2017-11-27 | Stop reason: HOSPADM

## 2017-11-20 RX ADMIN — HYDROCODONE BITARTRATE AND ACETAMINOPHEN 1 TABLET: 5; 325 TABLET ORAL at 09:36

## 2017-11-20 RX ADMIN — SODIUM CHLORIDE 1000 MG: 900 INJECTION, SOLUTION INTRAVENOUS at 17:33

## 2017-11-20 RX ADMIN — SODIUM BICARBONATE 650 MG TABLET 650 MG: at 17:34

## 2017-11-20 RX ADMIN — AMLODIPINE BESYLATE 10 MG: 10 TABLET ORAL at 08:43

## 2017-11-20 RX ADMIN — FUROSEMIDE 20 MG: 10 INJECTION, SOLUTION INTRAMUSCULAR; INTRAVENOUS at 17:35

## 2017-11-20 RX ADMIN — SODIUM BICARBONATE 650 MG TABLET 650 MG: at 13:28

## 2017-11-20 RX ADMIN — GUAIFENESIN 600 MG: 600 TABLET, EXTENDED RELEASE ORAL at 17:34

## 2017-11-20 RX ADMIN — METOPROLOL TARTRATE 25 MG: 25 TABLET, FILM COATED ORAL at 08:43

## 2017-11-20 RX ADMIN — GABAPENTIN 400 MG: 400 CAPSULE ORAL at 08:40

## 2017-11-20 RX ADMIN — HYDROCODONE BITARTRATE AND ACETAMINOPHEN 1 TABLET: 5; 325 TABLET ORAL at 03:00

## 2017-11-20 RX ADMIN — LOSARTAN POTASSIUM 100 MG: 50 TABLET ORAL at 08:42

## 2017-11-20 RX ADMIN — GUAIFENESIN 600 MG: 600 TABLET, EXTENDED RELEASE ORAL at 08:41

## 2017-11-20 RX ADMIN — INSULIN LISPRO 2 UNITS: 100 INJECTION, SOLUTION INTRAVENOUS; SUBCUTANEOUS at 09:34

## 2017-11-20 RX ADMIN — INSULIN LISPRO 4 UNITS: 100 INJECTION, SOLUTION INTRAVENOUS; SUBCUTANEOUS at 18:30

## 2017-11-20 RX ADMIN — INSULIN LISPRO 2 UNITS: 100 INJECTION, SOLUTION INTRAVENOUS; SUBCUTANEOUS at 13:34

## 2017-11-20 RX ADMIN — GABAPENTIN 400 MG: 400 CAPSULE ORAL at 17:33

## 2017-11-20 RX ADMIN — TAMSULOSIN HYDROCHLORIDE 0.4 MG: 0.4 CAPSULE ORAL at 08:41

## 2017-11-20 RX ADMIN — ASPIRIN 81 MG: 81 TABLET, COATED ORAL at 08:41

## 2017-11-20 RX ADMIN — FUROSEMIDE 20 MG: 10 INJECTION, SOLUTION INTRAMUSCULAR; INTRAVENOUS at 08:52

## 2017-11-20 NOTE — PROGRESS NOTES
[facility]  [address]  [address], [address], [address] [place] [address]  Dr. Rosalia Gudino, Dr. Bucky Oro  [phone] FAX# [phone]    Progress Note    Patient: Betty Wallis MRN: [id number]  SSN: [id number]    YOB: 1950  Age: 78 y.o. Sex: male      Admit Date: 11/17/2017    LOS: 3 days     Subjective:     RLE non-healing ulcerations. BLE Arterial Duplex completed. Tolerating PO. C/o BLE feet/hip pain. Denies SOB, chest pain, n/v/d, rigors, diaphoresis. Currently on ASA. Currently on Vancomycin/Levaquin. Objective:     Vitals:    11/19/17 1800 11/20/17 0705 11/20/17 0840 11/20/17 1008   BP: 160/66 175/64  180/69   Pulse: 71 67 66 67   Resp: 18 18 19   Temp: 98.2 °F (36.8 °C) 98.1 °F (36.7 °C)  98.3 °F (36.8 °C)   SpO2: 94% 97%  94%   Weight:       Height:            Intake and Output:  Current Shift:    Last three shifts: 11/18 1901 - 11/20 0700  In: 480 [P.O.:480]  Out: 2600 [Urine:2600]    Physical Exam:   GENERAL: A&Ox3, cooperative, no distress  EYE: PERRL, EOMIs, mucous membranes moist, non-icteric  NECK: supple, symmetric. No JVD  LUNG: clear to auscultation bilaterally  HEART: regular rate and rhythm,  ABDOMEN: soft, non-tender, non-distended. EXTREMITIES:  RLE foot lateral plantar surface erythema. RLE foot medial dorsum surface ulceration with exposed tendons. Nonpalpable RLE pulses. No edema BLE. NTTP BLE. Palpable 2+ BUE radial pulses. NEUROLOGIC:  strength 5/5. Motor 5/5. Face symmetric.  Tongue midline    Lab/Data Review:  BMP:   Lab Results   Component Value Date/Time     11/20/2017 03:49 AM    K 3.9 11/20/2017 03:49 AM     11/20/2017 03:49 AM    CO2 26 11/20/2017 03:49 AM    AGAP 9 11/20/2017 03:49 AM     (H) 11/20/2017 03:49 AM    BUN 35 (H) 11/20/2017 03:49 AM    CREA 1.64 (H) 11/20/2017 03:49 AM    GFRAA 51 (L) 11/20/2017 03:49 AM    GFRNA 42 (L) 11/20/2017 03:49 AM     CBC:   Lab Results   Component Value Date/Time    WBC 11.7 11/20/2017 03:49 AM    HGB 8.1 (L) 11/20/2017 03:49 AM    HCT 25.4 (L) 11/20/2017 03:49 AM     11/20/2017 03:49 AM      BLE ARTERIAL DUPLEX 11/20/17: 1. Moderate peripheral arterial disease indicated at rest in the right leg. 2. Disease is multi-level to include the superficial femoral, profunda femoris and tibioperoneal segments. 3. <50% stenosis of the posterior tibial artery. 4. >50% stenosis of the profunda femoris artery. 5. >75% stenosis of the distal superficial femoral artery. 6. Plaque appears irregular along the walls. 7. A monophasic signal is demonstrated in the proximal superficial femoral, distal superficial femoral, popliteal (ak), popliteal (bk), anterior tibial, posterior tibial and peroneal arteries. 8. The right ankle/brachial index is 0.79.  1.  Although OSIRIS's of the left lower extremity suggest adequate arterial perfusion distally, there is evidence of stenosis seen by duplex imaging in the distal superficial femoral, posterior tibial and anterior tibial ateries. 2. Plaque appears irregular along the walls. 3. >50% stenosis of the distal superficial femoral and posterior tibial arteries. 4. >75% stenosis of the anterior tibial artery. 5. The left ankle/brachial index is 0.95. ECHO 11/19/17: Left ventricle: Systolic function was mildly reduced by visual assessment. Ejection fraction was estimated in the range of 45 % to 50 %. There was mild diffuse hypokinesis. Doppler parameters were consistent with abnormal left ventricular relaxation (grade 1 diastolic dysfunction). Right ventricle: The ventricle was mildly dilated. Systolic function was   normal. TAPSE 2.3 cm. Left atrium: The atrium was dilated. Right atrium: The atrium was dilated. Mitral valve: There was mild regurgitation. Aortic valve: There was no stenosis.     Tricuspid valve: There was mild regurgitation. Pulmonary artery systolic   pressure: 50 mmHg. Inferior vena cava, hepatic veins: The inferior vena cava was dilated. The   respirophasic change in diameter was less  than 50%.     Pericardium: There was no pericardial effusion    CULTURE BLOOD 11/17/17: no growth in 3 days    Assessment:     Active Problems:    Pain of foot due to ischemia when walking ([address] (11/17/2017)      Gangrene ([address] (11/19/2017)      BLE PVD with RLE non-healing ulcerations  Plan:     Plan for Aortogram with RLE runoff and ?intervention 11/21/17 - consent obtained per patient  Discussed with and patient understands risk/benefits including bleeding, infection, pain, allergic/contrast reaction  Would like to proceed  NPO after midnight  PT/INR in AM  Continue local wound care    Signed By: Maame Zafar     November 20, 2017

## 2017-11-20 NOTE — PROGRESS NOTES
Assumed care of patient from night nurse ARISTIDES Garcia. Patient A&Ox4, resting in bed, with no signs or symptoms of pain and/or discomfort. Call bell in reach and encouraged patient to use if assistance is needed. Patient verbalized understanding. 0537- Patient complains of chest discomfort. No signs of shortness of breath or distress. Imformed preceptor Earl Trejo RN. She advised she will let doctor know. Will continue to monitor. 1000- Attempted to pass meds, patient refused. Educated patient of importance of taking meds.

## 2017-11-20 NOTE — PROGRESS NOTES
Pharmacy Dosing Services: Vancomycin    Indication: HAP    Day of therapy: 3    Other Antimicrobials (Include dose, start day & day of therapy):  Levaquin 750mg PO every 48 hours (started: )    Current Maintenance dose: 750 mg IV q24h     Goal Vancomycin Level: 15-20  (Trough 15-20 for most infections, 20 for meningitis/osteomyelitis, pre-HD level ~25)    Vancomycin Level (if drawn):    at 1430 - 15 (~10 hours post-dose)    Significant Cultures:   Blood - pending    Renal function stable? (unstable defined as SCr increase of 0.5 mg/dL or > 50% increase from baseline, whichever is greater) (Y/N): Y (improving)    CAPD, Hemodialysis or Renal Replacement Therapy (Y/N): N     Recent Labs      17   0349  17   0710  17   0606   CREA  1.64*  2.02*  2.47*   BUN  35*  44*  49*   WBC  11.7  13.4*  14.3*     Temp (24hrs), Av.2 °F (36.8 °C), Min:98.1 °F (36.7 °C), Max:98.3 °F (36.8 °C)    Creatinine Clearance (Creatinine Clearance (ml/min)): 44.6 ml/min     Regimen assessment: Adjust maintenance dose (extrapolated 12-hour trough < 15)  Maintenance dose: 1000 mg IV every 24 hours  Next scheduled level: 111/23 prior to 1600 dose       Pharmacy will follow daily and adjust medications as appropriate for renal function and/or serum levels.     Thank you,  Colleen Chirinos, PHARMD

## 2017-11-20 NOTE — PROGRESS NOTES
Daily Progress Note: 11/20/2017 4:46 PM   Admit Date: 11/17/2017    Patient seen in follow up for multiple medical problems as listed below:  Patient Active Problem List   Diagnosis Code    Hypertension I10    CAD (coronary artery disease) I25.10    Hyperlipidemia E78.5    Hip pain M25.559    Chest pain R07.9    COPD (chronic obstructive pulmonary disease) ([facility]) J44.9    Tobacco abuse Z72.0    DVT (deep venous thrombosis) ([facility]) I82.409    PNA (pneumonia) J18.9    Pain of foot due to ischemia when walking ([facility]) I73.9    Gangrene ([facility] I96       Assesment     Admitted with Fever, Altered mental status and also TAD. Found to have stage 4 ulcers on R foot and and ulceration top of left foot, ischemia R heal. Cardiology and vascular consulted. Incidental finding of elevated troponin with normal CK. Podiatry consulted, recommend better vascular supply prior to any foot surgery planned angiography 11/21. No wound cultures. - R and L foot ulcers and gangrene R heal - Podiatry and vascular consulting. On IV Abx  - Elevated troponin: in setting of physiologic stress, Fevers, TAD and some sort of infection. No CP. Doubt true angina or ACS at this time. - Fever, Leukocytosis: SIRS  - CAD s/p multiple stents to RCA , Most recent Cath 4/7/12 (Dr. Simona Kraft) 3 vessel CAD with nonciritcal stenosis in the LAD and Cx with total occlusion of the very proximal RCA site of prior stenting, repeat stenting of RCA  - HTN  - HLD  - DM  - COPD  - PAD  - Tobacco abuse  - DVT  - H/O Non-compliance per previous chart and records    DVT Protocol Active: Y  Code Status:  Full Code     Disposition: Req 72h hosp    Subjective:     CC: Shortness of Breath    Interval History: c/o some pain in chest and uncontrolled pain everywhere else.  Refused 12 lead ecg    ROS: 11 point ROS negative except for    Objective:     Visit Vitals    /68 (BP 1 Location: Right arm, BP Patient Position: At rest)    Pulse 66    Temp 98.1 °F (36.7 °C)    Resp 12    Ht 5' 9\" (1.753 m)    Wt 76.4 kg (168 lb 6.4 oz)    SpO2 95%    BMI 24.87 kg/m2       Temp (24hrs), Av.2 °F (36.8 °C), Min:98.1 °F (36.7 °C), Max:98.3 °F (36.8 °C)        Intake/Output Summary (Last 24 hours) at 17 1646  Last data filed at 17 1348   Gross per 24 hour   Intake              240 ml   Output             1800 ml   Net            -1560 ml       Gen: AOx3, NAD  HEENT:  TERI, EOMI. Neck: No Bruits/JVD   Lungs:   CTAB. Good respiratory effort  Heart:   RR S1 S2 without M/R/G  Abdomen: ND,NT, BSX4,   Extremities:   No LE edema. R gangrene heal. R wound with multiple bandages on top and bottom. Small abrasion top of L foot. Skin:  no jaundice.  See above      Data Review:     Meds/Labs/Tests reviewed    Current Shift:  701 - 1900  In: -   Out: 700 [Urine:700]  Last three shifts:  1901 -  07  In: 480 [P.O.:480]  Out: 2600 [Urine:2600]  Recent Labs      17   0710  17   0606   WBC  11.7  13.4*  14.3*   RBC  2.82*  2.60*  2.39*   HGB  8.1*  7.6*  7.0*   HCT  25.4*  23.5*  22.0*   PLT  378  317  329   GRANS  85*  87*  85*   LYMPH  8*  6*  10*   EOS  2  2  1       Recent Labs      17   03417   0710  17   0606   BUN  35*  44*  49*   CREA  1.64*  2.02*  2.47*   CA  7.4*  7.7*  7.4*   ALB  1.8*  1.9*  1.8*   K  3.9  4.8  6.0*   NA  140  138  138   CL  105  106  107   CO2  26  24  24   GLU  132*  140*  112*        Lab Results   Component Value Date/Time    Glucose 132 2017 03:49 AM    Glucose 140 2017 07:10 AM    Glucose 112 2017 06:06 AM    Glucose 136 2017 01:17 PM    Glucose 145 2017 08:00 AM          Care coordination with Nursing/Consultants/staff: 15  Prior history, labs, and charting reviewed: 15    Procedures/Imaging:  Angiography LE     Total time spent with chart review, patient examination/education, discussion with staff on case,documentation and medication management / adjustment  :  30 Minutes      Dr Pooja Campos  [phone]

## 2017-11-20 NOTE — PROGRESS NOTES
[facility]  -  Progress Note      Patient: Zeyad Limon MRN: [id number]  SSN: [id number]    YOB: 1950  Age: 78 y.o. Sex: male      Admit Date: 11/17/2017  Patient seen and examined independently. Troponin elevation felt to be on the basis of demand ischemia. Would restart Eliquis as soon as it is feasible. Agree with assessment and plan as noted below. Ruddy Bernheim MD  Assessment:     Hospital Problems  Date Reviewed: 11/19/2017          Codes Class Noted POA    Gangrene [facility]) ICD-10-CM: I02  ICD-9-CM: 785.4  11/19/2017 Unknown        Pain of foot due to ischemia when walking [facility]) ICD-10-CM: I73.9  ICD-9-CM: 443.9  11/17/2017 Unknown            -Ischemic right foot ulceration, being followed by podiatry and vascular surgery, pending RLE angiogram 11/21  - PAD, multi-level disease in RLE to include superficial femoral (>75% stenosis), profunda femoris (>50% stenosis) and tibioperoneal segments. LLE with > 50% stenosis of distal superficial femoral and posterior tibial arteries, > 75% stenosis of anterior tibial artery. - Elevated troponin: in setting of physiologic stress associated with above, Fevers, TAD and some sort of infection. No CP. Doubt true angina or ACS at this time. - Fever, Leukocytosis: SIRS  - CAD s/p multiple stents to RCA , Most recent Cath 4/7/12 (Dr. Lisa De Paz) 3 vessel CAD with nonciritcal stenosis in the LAD and Cx with total occlusion of the very proximal RCA site of prior stenting, repeat stenting of RCA  - HTN  - HLD  - DM  - COPD  - Tobacco abuse  - DVT  - H/O Non-compliance per previous chart and records    Plan:     -Continue Norvasc, Lopressor, Cozaar.  -Eliquis has been d/c by Vascular Surgery pending RLE angiogram tomorrow. Would resume Eliquis once cleared after all procedures have been completed, could d/c ASA at that time from cardiac standpoint. Subjective:     No new complaints. No chest pain.     Objective:      Patient Vitals for the past 8 hrs:   Temp Pulse Resp BP SpO2   11/20/17 0705 98.1 °F (36.7 °C) 67 18 175/64 97 %         Patient Vitals for the past 96 hrs:   Weight   11/17/17 1256 70.3 kg (155 lb)         Intake/Output Summary (Last 24 hours) at 11/20/17 0810  Last data filed at 11/20/17 0300   Gross per 24 hour   Intake              480 ml   Output              900 ml   Net             -420 ml       Physical Exam:  General:  alert, cooperative, no distress, appears stated age  Lungs:  clear to auscultation bilaterally  Heart:  regular rate and rhythm  Abdomen:  abdomen is soft without significant tenderness, masses, organomegaly or guarding  Extremities:  No edema. Right heel bandage.     Data Review:     Labs: Results:       Chemistry Recent Labs      11/20/17   0349 11/19/17   0710  11/18/17   0606   GLU  132*  140*  112*   NA  140  138  138   K  3.9  4.8  6.0*   CL  105  106  107   CO2  26  24  24   BUN  35*  44*  49*   CREA  1.64*  2.02*  2.47*   CA  7.4*  7.7*  7.4*   AGAP  9  8  7   BUCR  21*  22*  20   AP  74  73  69   TP  5.1*  5.3*  5.2*   ALB  1.8*  1.9*  1.8*   GLOB  3.3  3.4  3.4   AGRAT  0.5*  0.6*  0.5*      CBC w/Diff Recent Labs      11/20/17 0349 11/19/17   0710  11/18/17   0606   WBC  11.7  13.4*  14.3*   RBC  2.82*  2.60*  2.39*   HGB  8.1*  7.6*  7.0*   HCT  25.4*  23.5*  22.0*   PLT  378  317  329   GRANS  85*  87*  85*   LYMPH  8*  6*  10*   EOS  2  2  1      Lipid Panel Lab Results   Component Value Date/Time    Cholesterol, total 178 05/15/2016 03:28 AM    HDL Cholesterol 36 05/15/2016 03:28 AM    LDL, calculated 88 05/15/2016 03:28 AM    Triglyceride 269 05/15/2016 03:28 AM    CHOL/HDL Ratio 4.9 05/15/2016 03:28 AM      BNP Lab Results   Component Value Date/Time    BNP 81.4 05/14/2016 12:15 PM    BNP 72.5 03/15/2016 04:25 PM      Liver Enzymes Recent Labs      11/20/17   0349   TP  5.1*   ALB  1.8*   AP  74   SGOT  10*      Thyroid Studies Lab Results   Component Value Date/Time    TSH 0.45 11/18/2017 06:06 AM

## 2017-11-20 NOTE — CDMP QUERY
Please clarify if this patient is being treated/managed for:    =>CHF and if so please document the type    or  =>Other Explanation of clinical findings  =>Unable to Determine (no explanation of clinical findings)    The medical record reflects the following:    Risk: 79 yo male with PMH/CAD, CHF, DM2,     Clinical Indicators: PMH CHF  BNP  98992 on admission  11/18 PN  His nt probnp is elevated at 28,378. Will diurese the pt gently at this point .     Treatment: IV lasix, serial labs    Please clarify and document your clinical opinion in the progress notes and discharge summary including the definitive and/or presumptive diagnosis, (suspected or probable), related to the above clinical findings. Please include clinical findings supporting your diagnosis. If you DECLINE this query or would like to communicate with [facility], please utilize the \"Dragon Tail message box\" at the TOP of the Progress Note on the right.       Thank you,  Kris Chavez RN [facility] [phone]

## 2017-11-20 NOTE — PROGRESS NOTES
1940: Assumed pt care. Received pt resting in bed, alert and oriented. No s/s of distress. Pt stated he does not want to be bothered tonight he just wants to go to sleep. 2200: patient refused vital signs, and blood sugar to be taken, and refused to take his night medications. 11-    0731: Bedside and Verbal shift change report given to Ambika Patrick (oncoming nurse) by Kayode Krishnan   (offgoing nurse). Report included the following information SBAR, Kardex, Intake/Output and MAR.

## 2017-11-20 NOTE — PROGRESS NOTES
Problem: Falls - Risk of  Goal: *Absence of Falls  Document Alban Fall Risk and appropriate interventions in the flowsheet.    Outcome: Progressing Towards Goal  Fall Risk Interventions:  Mobility Interventions: Communicate number of staff needed for ambulation/transfer         Medication Interventions: Teach patient to arise slowly    Elimination Interventions: Call light in reach, Patient to call for help with toileting needs, Toileting schedule/hourly rounds

## 2017-11-20 NOTE — PROGRESS NOTES
Patient is unable to communicate at this time as he is resting peacefully after three attempts to visit with him throughout the day.  offered prayer and left Spiritual Care brochure. Chaplains will continue to follow and will provide pastoral care on an as needed/requested basis.     Catrina Lyons   Spiritual Care   [phone]

## 2017-11-20 NOTE — PROGRESS NOTES
Problem: Falls - Risk of  Goal: *Absence of Falls  Document Alban Fall Risk and appropriate interventions in the flowsheet.    Outcome: Progressing Towards Goal  Fall Risk Interventions:  Mobility Interventions: Communicate number of staff needed for ambulation/transfer         Medication Interventions: Patient to call before getting OOB    Elimination Interventions: Call light in reach

## 2017-11-20 NOTE — PROGRESS NOTES
Problem: Falls - Risk of  Goal: *Absence of Falls  Document Alban Fall Risk and appropriate interventions in the flowsheet.    Outcome: Progressing Towards Goal  Fall Risk Interventions:  Mobility Interventions: Patient to call before getting OOB         Medication Interventions: Patient to call before getting OOB    Elimination Interventions: Call light in reach, Patient to call for help with toileting needs

## 2017-11-20 NOTE — PROGRESS NOTES
Podiatry Surgery Progress Note    Patient: Parisa Lopez MRN: [id number] LOS: 3     YOB: 1950  Age: 78 y.o. Sex: male        Admit Date: 11/17/2017    POD * No surgery found *  Procedure:   * No surgery found *        Subjective:   Parisa Lopez is a 77 y/o seen at bedside this morning  state over all feeling better this morning. Patient is  currently resting quiet and no apparent distress.     Review of Systems:  Other than the above HPI  General: denies chronic fatigue, weight loss,   HEENT: denies ringing in ears, dizzy spells, sinus trouble, hoarseness  GI: denies bloating, heartburn, regurgitation, difficulty and or painful swallowing, nausea  Lungs: denies cough, SOB, hemoptysis  Heart: denies any current chest pain, irregular heart beat  Skin: denies rashes, hives, allergic reaction  Urinary: denies UTI, kidney stones   Bones and Joints: denies back pain, gout, osteoporosis  Neurologic: denies headaches, numbness    Objective:     Vitals  Patient Vitals for the past 8 hrs:   BP Temp Pulse Resp SpO2   11/20/17 0705 175/64 98.1 °F (36.7 °C) 67 18 97 %       I/O Current Shift       I/O Last Three Shifts  11/18 1901 - 11/20 0700  In: 480 [P.O.:480]  Out: 2400 [Urine:2400]      Data Review  Recent Results (from the past 24 hour(s))   GLUCOSE, POC    Collection Time: 11/19/17  8:04 AM   Result Value Ref Range    Glucose (POC) 152 (H) 70 - 110 mg/dL   GLUCOSE, POC    Collection Time: 11/19/17 11:54 AM   Result Value Ref Range    Glucose (POC) 195 (H) 70 - 110 mg/dL   GLUCOSE, POC    Collection Time: 11/19/17  5:35 PM   Result Value Ref Range    Glucose (POC) 142 (H) 70 - 110 mg/dL   CBC WITH AUTOMATED DIFF    Collection Time: 11/20/17  3:49 AM   Result Value Ref Range    WBC 11.7 4.6 - 13.2 K/uL    RBC 2.82 (L) 4.70 - 5.50 M/uL    HGB 8.1 (L) 13.0 - 16.0 g/dL    HCT 25.4 (L) 36.0 - 48.0 %    MCV 90.1 74.0 - 97.0 FL    MCH 28.7 24.0 - 34.0 PG    MCHC 31.9 31.0 - 37.0 g/dL    RDW 14.0 11.6 - 14.5 % PLATELET 504 228 - 952 K/uL    MPV 9.3 9.2 - 11.8 FL    NEUTROPHILS 85 (H) 40 - 73 %    LYMPHOCYTES 8 (L) 21 - 52 %    MONOCYTES 5 3 - 10 %    EOSINOPHILS 2 0 - 5 %    BASOPHILS 0 0 - 2 %    ABS. NEUTROPHILS 9.9 (H) 1.8 - 8.0 K/UL    ABS. LYMPHOCYTES 1.0 0.9 - 3.6 K/UL    ABS. MONOCYTES 0.6 0.05 - 1.2 K/UL    ABS. EOSINOPHILS 0.2 0.0 - 0.4 K/UL    ABS. BASOPHILS 0.0 0.0 - 0.06 K/UL    DF AUTOMATED     METABOLIC PANEL, COMPREHENSIVE    Collection Time: 11/20/17  3:49 AM   Result Value Ref Range    Sodium 140 136 - 145 mmol/L    Potassium 3.9 3.5 - 5.5 mmol/L    Chloride 105 100 - 108 mmol/L    CO2 26 21 - 32 mmol/L    Anion gap 9 3.0 - 18 mmol/L    Glucose 132 (H) 74 - 99 mg/dL    BUN 35 (H) 7.0 - 18 MG/DL    Creatinine 1.64 (H) 0.6 - 1.3 MG/DL    BUN/Creatinine ratio 21 (H) 12 - 20      GFR est AA 51 (L) >60 ml/min/1.73m2    GFR est non-AA 42 (L) >60 ml/min/1.73m2    Calcium 7.4 (L) 8.5 - 10.1 MG/DL    Bilirubin, total 0.3 0.2 - 1.0 MG/DL    ALT (SGPT) 9 (L) 16 - 61 U/L    AST (SGOT) 10 (L) 15 - 37 U/L    Alk. phosphatase 74 45 - 117 U/L    Protein, total 5.1 (L) 6.4 - 8.2 g/dL    Albumin 1.8 (L) 3.4 - 5.0 g/dL    Globulin 3.3 2.0 - 4.0 g/dL    A-G Ratio 0.5 (L) 0.8 - 1.7         Physical Exam:    Marquis Yao  is a 78 y.o. male. Pleasant, alert and oriented x3, in no apparent distress. . Patient is well-developed and nourished, with good attention to hygiene and body habitus. Mood and affect normal, appropriate to situation.      Lower Extremity Exam:      VASCULAR EXAM:. Pedal pulses are nonpalpable 0/4 DP 0/4 PT right and left foot. Skin temperature is warm to warm right and left foot. Digital capillary fill time is 5 seconds right and left foot.  There is edema of the right and left foot and ankle.      NEUROLOGICAL EXAM:. Sensation Intact with palpation  right and left foot.      MUSCULOSKELETAL EXAM:. Muscle tone is normal.  Muscle strength of the flexor and extensor group inversion and eversion Bilateral. 4/5.      DERMATOLOGICAL EXAM:. Skin is of abnormal texture and turgor with atrophic skin changes noting hair growth, nail changes (thickening), Bilateral. There is a ulcer full thickness with erythema and exposed EHL tendon dorsal right foot measuring 7.0cm x 2.0 cm with the exposed tendon measuring 1.9 cm. There is also noted gangrene of the posterior plantar aspect of the right heel measuring 5.5cm x 6.0 cm. There is exudate and mild odor. Wound Presentation:  Wound Foot Anterior;Right;Plantar (Active)   DRESSING STATUS Clean 11/19/2017  7:27 AM   DRESSING TYPE Elastic bandage;4 x 4;Other (Comment) 11/19/2017  7:27 AM   Pressure Injury Unstageable 11/19/2017  7:27 AM   Drainage Amount  Small  11/19/2017  7:27 AM   Drainage Color Serosanguinous 11/19/2017  7:27 AM   Number of days:3              Assessment:     Active Problems:    Pain of foot due to ischemia when walking ([facility] [address] (11/17/2017)      Gangrene ([facility] [address] (11/19/2017)          Plan/Recommendations/Medical Decision Making:     Discussed with the patient all the above findings and overall treatment / care program. All questions answered and he indicated that he understood     Continue present treatment LWC and ABx.     Angio per Vascular Surgery tomorrow.          Dr Adel[facility]  C) [phone]  11/20/2017  7:46 AM

## 2017-11-20 NOTE — WOUND CARE
Received IP wound care consult and reviewed EMR. Patient currently being followed by podiatry for wound care. Per Podiatry note, patient pending vascular surgery and nursing to continue [facility] and ARSENIO. Wound care to defer to podiatry at this time, please place new IP wound care consult should podiatry request further recommendations.

## 2017-11-20 NOTE — ROUTINE PROCESS
Bedside and Verbal shift change report given to [facility] (oncoming nurse) by Lynsey Michaels RN   (offgoing nurse). Report included the following information SBAR, Kardex, MAR and Recent Results.

## 2017-11-20 NOTE — PROGRESS NOTES
Assumed care of pt from night nurse Rocael Del Cid RN. Received with patient in bed, A&O x 4. Patient denies pain and/or discomfort. Call light in reach. Encouraged to call for assistance, Pt verbalized understanding. 0255 - Patient complained of chest pain, no signs of pain and/or distress, patient repositioned more upright in bed, chest pain in mid upper gastric area, inquired if had indigestion in past patient responded yes, however, Dr. Cool to unit advised of patient complaints of chest pain, advised this nurse he would see patient. No further orders at this time. Bo Shane, primary nurse informed of this information. 80 - Dr. Cool to unit after assessment of patient request EKG, due to cardiac history, RBV.

## 2017-11-20 NOTE — INTERDISCIPLINARY ROUNDS
IDR Summary      Patient: Dewayne Zazueta MRN: [id number]    Age: 78 y.o.  : 1950     Admit Diagnosis: Pain of foot due to ischemia when walking [facility])      Problems pertinent to hospital stay:     Consults: P. T and Case Management     Testing due for patient today? YES    Nutrition plan:Yes     Mobility needs: Yes      Lines/Tubes:   IV: YES  Needed: YES  Smauel: NO  Needed:NO  Central Line: NO Needed: NO      VTE Prophylaxis: Not needed    Influenza Vaccine received?  YES        Care Management:  Discharge plan: Return to [facility]    PCP: Moses Ashton MD    : NO  Financial concerns:No   Interventions:       LOS: 3 days     Expected days until discharge: 2-3 days        Signed:     MARCELO Cano-BC  [address]  Hospitalist Division  Pager:  [phone]  Office:  [phone]

## 2017-11-21 LAB
ANION GAP SERPL CALC-SCNC: 7 MMOL/L (ref 3–18)
ATRIAL RATE: 65 BPM
BUN SERPL-MCNC: 35 MG/DL (ref 7–18)
BUN/CREAT SERPL: 21 (ref 12–20)
CALCIUM SERPL-MCNC: 7.4 MG/DL (ref 8.5–10.1)
CALCULATED P AXIS, ECG09: 21 DEGREES
CALCULATED R AXIS, ECG10: 50 DEGREES
CALCULATED T AXIS, ECG11: -51 DEGREES
CHLORIDE SERPL-SCNC: 107 MMOL/L (ref 100–108)
CO2 SERPL-SCNC: 28 MMOL/L (ref 21–32)
CREAT SERPL-MCNC: 1.66 MG/DL (ref 0.6–1.3)
DIAGNOSIS, 93000: NORMAL
GLUCOSE BLD STRIP.AUTO-MCNC: 140 MG/DL (ref 70–110)
GLUCOSE BLD STRIP.AUTO-MCNC: 177 MG/DL (ref 70–110)
GLUCOSE BLD STRIP.AUTO-MCNC: 180 MG/DL (ref 70–110)
GLUCOSE BLD STRIP.AUTO-MCNC: 194 MG/DL (ref 70–110)
GLUCOSE SERPL-MCNC: 153 MG/DL (ref 74–99)
INR PPP: 1 (ref 0.8–1.2)
P-R INTERVAL, ECG05: 168 MS
POTASSIUM SERPL-SCNC: 4.1 MMOL/L (ref 3.5–5.5)
PROTHROMBIN TIME: 12.9 SEC (ref 11.5–15.2)
Q-T INTERVAL, ECG07: 438 MS
QRS DURATION, ECG06: 92 MS
QTC CALCULATION (BEZET), ECG08: 455 MS
SODIUM SERPL-SCNC: 142 MMOL/L (ref 136–145)
VENTRICULAR RATE, ECG03: 65 BPM

## 2017-11-21 PROCEDURE — 74011250637 HC RX REV CODE- 250/637: Performed by: HOSPITALIST

## 2017-11-21 PROCEDURE — 74011250636 HC RX REV CODE- 250/636: Performed by: HOSPITALIST

## 2017-11-21 PROCEDURE — 77030012597

## 2017-11-21 PROCEDURE — 74011250637 HC RX REV CODE- 250/637: Performed by: INTERNAL MEDICINE

## 2017-11-21 PROCEDURE — C1887 CATHETER, GUIDING: HCPCS

## 2017-11-21 PROCEDURE — 77030020250 HC SOL INJ D 5% LFCR 1000ML BG LF

## 2017-11-21 PROCEDURE — 77030008584 HC TOOL GDWRE DEV TERU -A

## 2017-11-21 PROCEDURE — 36415 COLL VENOUS BLD VENIPUNCTURE: CPT | Performed by: PHYSICIAN ASSISTANT

## 2017-11-21 PROCEDURE — 75710 ARTERY X-RAYS ARM/LEG: CPT

## 2017-11-21 PROCEDURE — 77030004534 HC CATH ANGI DX INFN CARD -A

## 2017-11-21 PROCEDURE — C1769 GUIDE WIRE: HCPCS

## 2017-11-21 PROCEDURE — 04CK3ZZ EXTIRPATION OF MATTER FROM RIGHT FEMORAL ARTERY, PERCUTANEOUS APPROACH: ICD-10-PCS | Performed by: SURGERY

## 2017-11-21 PROCEDURE — 74011250636 HC RX REV CODE- 250/636

## 2017-11-21 PROCEDURE — 80048 BASIC METABOLIC PNL TOTAL CA: CPT | Performed by: PHYSICIAN ASSISTANT

## 2017-11-21 PROCEDURE — 82962 GLUCOSE BLOOD TEST: CPT

## 2017-11-21 PROCEDURE — 74011250636 HC RX REV CODE- 250/636: Performed by: SURGERY

## 2017-11-21 PROCEDURE — C1894 INTRO/SHEATH, NON-LASER: HCPCS

## 2017-11-21 PROCEDURE — C1725 CATH, TRANSLUMIN NON-LASER: HCPCS

## 2017-11-21 PROCEDURE — 65660000000 HC RM CCU STEPDOWN

## 2017-11-21 PROCEDURE — 74011000250 HC RX REV CODE- 250: Performed by: SURGERY

## 2017-11-21 PROCEDURE — C1892 INTRO/SHEATH,FIXED,PEEL-AWAY: HCPCS

## 2017-11-21 PROCEDURE — 75625 CONTRAST EXAM ABDOMINL AORTA: CPT

## 2017-11-21 PROCEDURE — 74011636320 HC RX REV CODE- 636/320: Performed by: SURGERY

## 2017-11-21 PROCEDURE — 85610 PROTHROMBIN TIME: CPT | Performed by: PHYSICIAN ASSISTANT

## 2017-11-21 PROCEDURE — C1724 CATH, TRANS ATHEREC,ROTATION: HCPCS

## 2017-11-21 PROCEDURE — 37225 HC ARTHERC FEMPOP UNI +/-PTA: CPT

## 2017-11-21 PROCEDURE — 74011636637 HC RX REV CODE- 636/637: Performed by: HOSPITALIST

## 2017-11-21 PROCEDURE — B41D1ZZ FLUOROSCOPY OF AORTA AND BILATERAL LOWER EXTREMITY ARTERIES USING LOW OSMOLAR CONTRAST: ICD-10-PCS | Performed by: SURGERY

## 2017-11-21 PROCEDURE — 047K3ZZ DILATION OF RIGHT FEMORAL ARTERY, PERCUTANEOUS APPROACH: ICD-10-PCS | Performed by: SURGERY

## 2017-11-21 PROCEDURE — C1760 CLOSURE DEV, VASC: HCPCS

## 2017-11-21 PROCEDURE — 74011250636 HC RX REV CODE- 250/636: Performed by: INTERNAL MEDICINE

## 2017-11-21 RX ORDER — HEPARIN SODIUM 200 [USP'U]/100ML
500 INJECTION, SOLUTION INTRAVENOUS ONCE
Status: COMPLETED | OUTPATIENT
Start: 2017-11-21 | End: 2017-11-21

## 2017-11-21 RX ORDER — MIDAZOLAM HYDROCHLORIDE 1 MG/ML
INJECTION, SOLUTION INTRAMUSCULAR; INTRAVENOUS
Status: COMPLETED
Start: 2017-11-21 | End: 2017-11-21

## 2017-11-21 RX ORDER — CEFAZOLIN SODIUM 2 G/50ML
2 SOLUTION INTRAVENOUS ONCE
Status: COMPLETED | OUTPATIENT
Start: 2017-11-21 | End: 2017-11-21

## 2017-11-21 RX ORDER — FENTANYL CITRATE 50 UG/ML
25-100 INJECTION, SOLUTION INTRAMUSCULAR; INTRAVENOUS
Status: DISCONTINUED | OUTPATIENT
Start: 2017-11-21 | End: 2017-11-21

## 2017-11-21 RX ORDER — IODIXANOL 320 MG/ML
1-150 INJECTION, SOLUTION INTRAVASCULAR
Status: DISCONTINUED | OUTPATIENT
Start: 2017-11-21 | End: 2017-11-21

## 2017-11-21 RX ORDER — FENTANYL CITRATE 50 UG/ML
INJECTION, SOLUTION INTRAMUSCULAR; INTRAVENOUS
Status: COMPLETED
Start: 2017-11-21 | End: 2017-11-21

## 2017-11-21 RX ORDER — LIDOCAINE HYDROCHLORIDE 10 MG/ML
1-60 INJECTION INFILTRATION; PERINEURAL
Status: DISCONTINUED | OUTPATIENT
Start: 2017-11-21 | End: 2017-11-21

## 2017-11-21 RX ORDER — MIDAZOLAM HYDROCHLORIDE 1 MG/ML
.5-2 INJECTION, SOLUTION INTRAMUSCULAR; INTRAVENOUS
Status: DISCONTINUED | OUTPATIENT
Start: 2017-11-21 | End: 2017-11-21

## 2017-11-21 RX ORDER — HEPARIN SODIUM 1000 [USP'U]/ML
10-10000 INJECTION, SOLUTION INTRAVENOUS; SUBCUTANEOUS
Status: DISCONTINUED | OUTPATIENT
Start: 2017-11-21 | End: 2017-11-21

## 2017-11-21 RX ADMIN — LEVOFLOXACIN 750 MG: 750 TABLET, FILM COATED ORAL at 11:34

## 2017-11-21 RX ADMIN — INSULIN LISPRO 2 UNITS: 100 INJECTION, SOLUTION INTRAVENOUS; SUBCUTANEOUS at 17:55

## 2017-11-21 RX ADMIN — TAMSULOSIN HYDROCHLORIDE 0.4 MG: 0.4 CAPSULE ORAL at 11:34

## 2017-11-21 RX ADMIN — FENTANYL CITRATE 50 MCG: 50 INJECTION, SOLUTION INTRAMUSCULAR; INTRAVENOUS at 09:54

## 2017-11-21 RX ADMIN — GABAPENTIN 400 MG: 400 CAPSULE ORAL at 11:33

## 2017-11-21 RX ADMIN — DULOXETINE 60 MG: 60 CAPSULE, DELAYED RELEASE ORAL at 11:33

## 2017-11-21 RX ADMIN — SODIUM BICARBONATE 650 MG TABLET 650 MG: at 11:34

## 2017-11-21 RX ADMIN — FENTANYL CITRATE 25 MCG: 50 INJECTION, SOLUTION INTRAMUSCULAR; INTRAVENOUS at 10:10

## 2017-11-21 RX ADMIN — MIDAZOLAM HYDROCHLORIDE 1 MG: 1 INJECTION, SOLUTION INTRAMUSCULAR; INTRAVENOUS at 09:51

## 2017-11-21 RX ADMIN — FENTANYL CITRATE 50 MCG: 50 INJECTION, SOLUTION INTRAMUSCULAR; INTRAVENOUS at 10:34

## 2017-11-21 RX ADMIN — FINASTERIDE 5 MG: 5 TABLET, FILM COATED ORAL at 14:44

## 2017-11-21 RX ADMIN — MIDAZOLAM HYDROCHLORIDE 1 MG: 1 INJECTION, SOLUTION INTRAMUSCULAR; INTRAVENOUS at 09:54

## 2017-11-21 RX ADMIN — FENTANYL CITRATE 50 MCG: 50 INJECTION, SOLUTION INTRAMUSCULAR; INTRAVENOUS at 10:24

## 2017-11-21 RX ADMIN — FENTANYL CITRATE 50 MCG: 50 INJECTION, SOLUTION INTRAMUSCULAR; INTRAVENOUS at 09:51

## 2017-11-21 RX ADMIN — SODIUM BICARBONATE 650 MG TABLET 650 MG: at 14:44

## 2017-11-21 RX ADMIN — ATORVASTATIN CALCIUM 80 MG: 40 TABLET, FILM COATED ORAL at 11:34

## 2017-11-21 RX ADMIN — HEPARIN SODIUM 1000 UNITS: 200 INJECTION, SOLUTION INTRAVENOUS at 10:16

## 2017-11-21 RX ADMIN — GABAPENTIN 400 MG: 400 CAPSULE ORAL at 17:55

## 2017-11-21 RX ADMIN — GUAIFENESIN 600 MG: 600 TABLET, EXTENDED RELEASE ORAL at 11:34

## 2017-11-21 RX ADMIN — LIDOCAINE HYDROCHLORIDE 6 ML: 10 INJECTION, SOLUTION INFILTRATION; PERINEURAL at 10:19

## 2017-11-21 RX ADMIN — AMLODIPINE BESYLATE 10 MG: 10 TABLET ORAL at 11:33

## 2017-11-21 RX ADMIN — FENTANYL CITRATE 25 MCG: 50 INJECTION, SOLUTION INTRAMUSCULAR; INTRAVENOUS at 10:05

## 2017-11-21 RX ADMIN — FENTANYL CITRATE 50 MCG: 50 INJECTION, SOLUTION INTRAMUSCULAR; INTRAVENOUS at 10:19

## 2017-11-21 RX ADMIN — MIRABEGRON 25 MG: 25 TABLET, FILM COATED, EXTENDED RELEASE ORAL at 14:44

## 2017-11-21 RX ADMIN — HYDROCODONE BITARTRATE AND ACETAMINOPHEN 2 TABLET: 5; 325 TABLET ORAL at 06:42

## 2017-11-21 RX ADMIN — IODIXANOL 35 ML: 320 INJECTION, SOLUTION INTRAVASCULAR at 10:41

## 2017-11-21 RX ADMIN — HYDROCODONE BITARTRATE AND ACETAMINOPHEN 2 TABLET: 5; 325 TABLET ORAL at 14:44

## 2017-11-21 RX ADMIN — FUROSEMIDE 20 MG: 10 INJECTION, SOLUTION INTRAMUSCULAR; INTRAVENOUS at 17:55

## 2017-11-21 RX ADMIN — METOPROLOL TARTRATE 25 MG: 25 TABLET, FILM COATED ORAL at 11:34

## 2017-11-21 RX ADMIN — FUROSEMIDE 20 MG: 10 INJECTION, SOLUTION INTRAMUSCULAR; INTRAVENOUS at 11:33

## 2017-11-21 RX ADMIN — CEFAZOLIN SODIUM 2 G: 2 SOLUTION INTRAVENOUS at 09:30

## 2017-11-21 RX ADMIN — GUAIFENESIN 600 MG: 600 TABLET, EXTENDED RELEASE ORAL at 17:55

## 2017-11-21 RX ADMIN — HEPARIN SODIUM 3000 UNITS: 1000 INJECTION, SOLUTION INTRAVENOUS; SUBCUTANEOUS at 10:13

## 2017-11-21 RX ADMIN — MIDAZOLAM HYDROCHLORIDE 1 MG: 1 INJECTION, SOLUTION INTRAMUSCULAR; INTRAVENOUS at 10:10

## 2017-11-21 RX ADMIN — MIDAZOLAM HYDROCHLORIDE 0.5 MG: 1 INJECTION, SOLUTION INTRAMUSCULAR; INTRAVENOUS at 10:19

## 2017-11-21 RX ADMIN — ASPIRIN 81 MG: 81 TABLET, COATED ORAL at 11:34

## 2017-11-21 RX ADMIN — SODIUM BICARBONATE 650 MG TABLET 650 MG: at 17:56

## 2017-11-21 RX ADMIN — LOSARTAN POTASSIUM 100 MG: 50 TABLET ORAL at 11:34

## 2017-11-21 RX ADMIN — MIDAZOLAM HYDROCHLORIDE 0.5 MG: 1 INJECTION, SOLUTION INTRAMUSCULAR; INTRAVENOUS at 10:05

## 2017-11-21 RX ADMIN — SODIUM CHLORIDE 1000 MG: 900 INJECTION, SOLUTION INTRAVENOUS at 17:59

## 2017-11-21 NOTE — H&P
Date of Surgery Update:  Robert Sands was seen and examined. History and physical has been reviewed. The patient has been examined.  There have been no significant clinical changes since the completion of the originally dated History and Physical.    Signed By: Brad Garcia MD     November 21, 2017 9:35 AM

## 2017-11-21 NOTE — PROCEDURES
AVVA  ANGIO OP NOTE    Date: 11/21/2017    Hospital:  [facility]/[address]  Pre-Op Dx :Blanca Goodson lower extremity arterial insufficiency with  tissue loss  right  Procedure : Aortogram with imaging of Right infrainguinal runoff vessels    Percutaneous atherectomy R SFA with 2.2 mm Phoenix  `  Percutaneous angioplasty R SFA with 5x200 balloon  Surgeon : Carlos Winn MD  Assistant: Devin Passer  Anesthesia : Moderate Sedation  Findings :    Patent infrarenal aorta and left renal artery. There appears to be significant RRA stenosis   Patent B AUNDREA, EIA and hypogastric arteries   RLE    Patent CFA and DFA. The SFA was nearly occluded at the adductor canal.  The artery underwent atherectomy and angioplasty with <5% residual stenosis    Patent POP    3 vessel run off  Comp: None  EBL : Minimal  Contrast : 35 ml. Visipaque  Access : Left femoral artery    Procedure: The patient was brought to the angio suite, after the placement of monitoring devices and the administration of conscious sedation. Both groins were prepped and draped in normal sterile fashion. Local anesthesia was infiltrated in the skin and subcutaneous isues of the left groin and the left femoral artery was cannulated with a micropuncture needle and a wire was advanced under imaging. A 5 Welsh sheath was advanced over the wire and the wire removed. A [employer] wire was advanced into the abdominal aorta and an aortogram was performed utilizing CO2. Run-off film was obtained by selecting the iliac system using the Universal flush catheter. A glide wire and trailblazer catheter were advanced into the proximal SFA and a RLE run off was performed. The finding are above. An amplatz wire was advanced. The catheter and sheath were exchanged for a 6 fr 55 cm Raabe. The patient received 3000 units of heparin. A glide wire and trailblazer catheter were advanced into the AKPOP. A small hand injection was used to verify.   The glide wire was exchanged for a .014 wire.  The SFA underwent percutaneous atherectomy with a 2.2 mm Phoenix. The SFA then underwent PTA with a 5x200 balloon. The balloon was inflated to 10 margarita and held in place for 2 minutes. There was minimal (<5%) residual stenosis. The balloon and wire were removed. The Raabe sheath was exchanged for a 11 cm 6 fr sheath. A 6 fr Exoseal was deployed. The patient tolerated the procedure well and there were no complications.      Laquita Valiente MD, Rozina Pascual MD, M.D.

## 2017-11-21 NOTE — PROGRESS NOTES
Would continue current medical management, resume Eliquis when okay by vascular surgery. Will be available as needed.    Rojelio Foster MD

## 2017-11-21 NOTE — PROGRESS NOTES
11/20/17 2100   [facility] (refusing medication and all care offered.)

## 2017-11-21 NOTE — PROGRESS NOTES
IVCU:    7853 Pt received via bed from vascular/cath lab post angiogram. Pt is alert and reports chronic back pain. Dressing to left groin is clean, dry, and intact. No bleeding or hematoma present. VS within pt baseline prior to procedure. Pedal and post tibial pulses auscultated with doppler. Will continue to monitor per IVCU protocol and MD orders. Pt on bedrest for 4 hours. Plan to transfer pt back to 2 [facility] room 2111.     1134 PO medications administered and tolerated well. Provided applesauce and gumaro crackers. 1300 Provided lunch. 1347 TRANSFER - OUT REPORT:    Telephone report given to Timur Bell (name) on Karina Side  being transferred to 2 [facility] room 2111 (unit) for routine progression of care       Report consisted of patients Situation, Background, Assessment and   Recommendations(SBAR). Information from the following report(s) SBAR, Procedure Summary, MAR and Cardiac Rhythm NSR was reviewed with the receiving nurse.     Lines:   Peripheral IV 11/17/17 Right Antecubital (Active)   Site Assessment Clean, dry, & intact 11/21/2017 11:15 AM   Phlebitis Assessment 0 11/21/2017 11:15 AM   Infiltration Assessment 0 11/21/2017 11:15 AM   Dressing Status Clean, dry, & intact 11/21/2017 11:15 AM   Dressing Type Transparent;Tape 11/21/2017 11:15 AM   Hub Color/Line Status Pink 11/21/2017 11:15 AM   Action Taken Open ports on tubing capped 11/21/2017 11:15 AM   Alcohol Cap Used Yes 11/21/2017 11:15 AM       Peripheral IV 11/18/17 Right Forearm (Active)   Site Assessment Clean, dry, & intact 11/21/2017 11:15 AM   Phlebitis Assessment 0 11/21/2017 11:15 AM   Infiltration Assessment 0 11/21/2017 11:15 AM   Dressing Status Clean, dry, & intact 11/21/2017 11:15 AM   Dressing Type Transparent;Tape 11/21/2017 11:15 AM   Hub Color/Line Status Blue 11/21/2017 11:15 AM   Action Taken Open ports on tubing capped 11/21/2017 11:15 AM   Alcohol Cap Used Yes 11/21/2017 11:15 AM        Opportunity for questions and clarification was provided. Patient will be transported with:   Registered Nurse    0206 Pt transported via bed to room 2111 with transporter. Telemetry monitor applied and verified with monitor tech. Unit CNA at the bedside. Primary nurse not available. Unit charge nurse and manager aware of pt in room. Pt left in stable condition with call bell in reach.

## 2017-11-21 NOTE — PROGRESS NOTES
Problem: Falls - Risk of  Goal: *Absence of Falls  Document Alban Fall Risk and appropriate interventions in the flowsheet.    Outcome: Progressing Towards Goal  Fall Risk Interventions:  Mobility Interventions: Communicate number of staff needed for ambulation/transfer         Medication Interventions: Bed/chair exit alarm, Patient to call before getting OOB    Elimination Interventions: Call light in reach, Toilet paper/wipes in reach, Urinal in reach, Toileting schedule/hourly rounds

## 2017-11-21 NOTE — PROGRESS NOTES
Podiatry Surgery Progress Note    Patient: Zeyad Limon MRN: [id number] LOS: 4     YOB: 1950  Age: 78 y.o. Sex: male        Admit Date: 11/17/2017    POD * No surgery found *  Procedure:   * No surgery found *        Subjective:     Zeyad Limon is a 79 y/o seen at bedside this morning  state over all feeling better this morning.   Patient is  currently resting quiet and no apparent distress.     Review of Systems:  Other than the above HPI  General: denies chronic fatigue, weight loss,   HEENT: denies ringing in ears, dizzy spells, sinus trouble, hoarseness  GI: denies bloating, heartburn, regurgitation, difficulty and or painful swallowing, nausea  Lungs: denies cough, SOB, hemoptysis  Heart: denies any current chest pain, irregular heart beat  Skin: denies rashes, hives, allergic reaction  Urinary: denies UTI, kidney stones   Bones and Joints: denies back pain, gout, osteoporosis  Neurologic: denies headaches, numbness    Objective:     Vitals  Patient Vitals for the past 8 hrs:   BP Temp Pulse Resp SpO2   11/21/17 0625 162/69 97.9 °F (36.6 °C) 66 16 99 %       I/O Current Shift  11/21 0701 - 11/21 1900  In: -   Out: 200 [Urine:200]    I/O Last Three Shifts  11/19 1901 - 11/21 0700  In: -   Out: 3050 [Urine:3050]      Data Review  Recent Results (from the past 24 hour(s))   GLUCOSE, POC    Collection Time: 11/20/17 11:52 AM   Result Value Ref Range    Glucose (POC) 163 (H) 70 - 110 mg/dL   VANCOMYCIN, TROUGH    Collection Time: 11/20/17  2:35 PM   Result Value Ref Range    Vancomycin,trough 15.0 10.0 - 20.0 ug/mL    Reported dose date: 20171119      Reported dose time: 1300      Reported dose: 750 MG UNITS   GLUCOSE, POC    Collection Time: 11/20/17  5:56 PM   Result Value Ref Range    Glucose (POC) 223 (H) 70 - 110 mg/dL   PROTHROMBIN TIME + INR    Collection Time: 11/21/17  4:00 AM   Result Value Ref Range    Prothrombin time 12.9 11.5 - 15.2 sec    INR 1.0 0.8 - 1.2     METABOLIC PANEL, BASIC    Collection Time: 11/21/17  4:00 AM   Result Value Ref Range    Sodium 142 136 - 145 mmol/L    Potassium 4.1 3.5 - 5.5 mmol/L    Chloride 107 100 - 108 mmol/L    CO2 28 21 - 32 mmol/L    Anion gap 7 3.0 - 18 mmol/L    Glucose 153 (H) 74 - 99 mg/dL    BUN 35 (H) 7.0 - 18 MG/DL    Creatinine 1.66 (H) 0.6 - 1.3 MG/DL    BUN/Creatinine ratio 21 (H) 12 - 20      GFR est AA 50 (L) >60 ml/min/1.73m2    GFR est non-AA 42 (L) >60 ml/min/1.73m2    Calcium 7.4 (L) 8.5 - 10.1 MG/DL   GLUCOSE, POC    Collection Time: 11/21/17  6:39 AM   Result Value Ref Range    Glucose (POC) 180 (H) 70 - 110 mg/dL   GLUCOSE, POC    Collection Time: 11/21/17  8:01 AM   Result Value Ref Range    Glucose (POC) 177 (H) 70 - 110 mg/dL       Physical Exam:  Mickey Duval is a 78 y.o. male. Pleasant, alert and oriented x3, in no apparent distress. . Patient is well-developed and nourished, with good attention to hygiene and body habitus. Mood and affect normal, appropriate to situation. Lower Extremity Exam:     VASCULAR EXAM:. Pedal pulses are nonpalpable 0/4 DP 0/4 PT right and left foot. Skin temperature is warm to warm right and left foot. Digital capillary fill time is 5 seconds right and left foot. There is edema of the right and left foot and ankle. NEUROLOGICAL EXAM:. Sensation Intact with palpation right and left foot. MUSCULOSKELETAL EXAM:. Muscle tone is normal. Muscle strength of the flexor and extensor group inversion and eversion Bilateral. 4/5. DERMATOLOGICAL EXAM:. Skin is of abnormal texture and turgor with atrophic skin changes noting hair growth, nail changes (thickening), Bilateral. There is a ulcer full thickness with erythema and exposed EHL tendon dorsal right foot measuring 7.0cm x 2.0 cm with the exposed tendon measuring 1.9 cm. There is also noted gangrene of the posterior plantar aspect of the right heel measuring 5.5cm x 6.0 cm. There is exudate and mild odor.       Wound Presentation:  Wound Foot Anterior;Right;Plantar (Active)   DRESSING STATUS Removed 11/20/2017 10:15 AM   DRESSING TYPE Foam 11/20/2017 10:15 AM   Pressure Injury Unstageable 11/20/2017 10:15 AM   Drainage Amount  Moderate 11/20/2017 10:15 AM   Drainage Color Serosanguinous 11/20/2017 10:15 AM   Wound Odor Mild 11/20/2017 10:15 AM   Periwound Skin Condition Other (comment) 11/20/2017 10:15 AM   Cleansing and Cleansing Agents  Dermal wound cleanser 11/20/2017 10:15 AM   Dressing Type Applied Foam 11/20/2017 10:15 AM   Number of days:4       Wound Heel (Active)   Drainage Amount  Scant 11/20/2017 10:15 AM   Drainage Color Serosanguinous 11/20/2017 10:15 AM   Wound Odor Mild 11/20/2017 10:15 AM   Periwound Skin Condition Other (comment) 11/20/2017 10:15 AM   Cleansing and Cleansing Agents  Dermal wound cleanser 11/20/2017 10:15 AM   Dressing Type Applied Foam 11/20/2017 10:15 AM   Procedure Tolerated Well 11/20/2017 10:15 AM   Number of days:1              Assessment:     Active Problems:    Pain of foot due to ischemia when walking ([address] (11/17/2017)      Gangrene ([address] (11/19/2017)          Plan/Recommendations/Medical Decision Making:     Discussed with the patient all the above findings and overall treatment / care program after he has vascular procedure. All questions answered and he indicated that he understood. Continue present treatment LWC and ABx. Angio per Vascular Surgery.        Dr [facility]  C) [phone]  11/21/2017  8:21 AM

## 2017-11-21 NOTE — PROGRESS NOTES
[facility] Cardiac Cath Lab:  Pre Procedure Chart Check    Patients chart was accessed and reviewed for possible and/or scheduled procedure. Creatinine Clearance:  CREATININE: 1.66 MG/DL ABNORMAL (11/21/17 0400)  Estimated creatinine clearance: 43.8 mL/min    Total Contrast  Load:  3 x estimated clearance amount=   131.4    ml    75% of Contrast Load:  0.75 x Total Contrast Load=   98.5     ml      Recent Labs      11/21/17   0400  11/20/17   0349   WBC   --   11.7   RBC   --   2.82*   HCT   --   25.4*   HGB   --   8.1*   PLT   --   378   INR  1.0   --    PTP  12.9   --    NA  142  140   K  4.1  3.9   BUN  35*  35*   CREA  1.66*  1.64*   GFRAA  50*  51*   GFRNA  42*  42*   CA  7.4*  7.4*       BMI: Body mass index is 24.87 kg/(m^2). ALLERGIES:   Allergies   Allergen Reactions    Biaxin [Clarithromycin] Rash       Lines:        Peripheral IV 11/17/17 Right Antecubital (Active)   Site Assessment Clean, dry, & intact 11/20/2017  7:00 PM   Phlebitis Assessment 0 11/20/2017  7:00 PM   Infiltration Assessment 0 11/20/2017  7:00 PM   Dressing Status Clean, dry, & intact 11/20/2017  7:00 PM   Dressing Type Transparent;Tape 11/20/2017  7:00 PM   Hub Color/Line Status Pink 11/20/2017  7:00 PM   Action Taken Open ports on tubing capped 11/20/2017  7:00 PM   Alcohol Cap Used Yes 11/20/2017  7:00 PM       Peripheral IV 11/18/17 Right Forearm (Active)   Site Assessment Clean, dry, & intact 11/20/2017  7:00 PM   Phlebitis Assessment 0 11/20/2017  7:00 PM   Infiltration Assessment 0 11/20/2017  7:00 PM   Dressing Status Clean, dry, & intact 11/20/2017  7:00 PM   Dressing Type Transparent;Tape 11/20/2017  7:00 PM   Hub Color/Line Status Blue; Infusing 11/20/2017  7:00 PM   Action Taken Open ports on tubing capped 11/20/2017  7:00 PM   Alcohol Cap Used Yes 11/20/2017  7:00 PM          History:  Past Medical History:   Diagnosis Date    CAD (coronary artery disease)     S/P RCA stents in past    [facility] ([facility] obstructive [facility]) [facility]     Diabetes ([facility] [address]     Hypertension     Incomplete bladder emptying     Pneumonia     Stroke (cerebrum) ([facility])      Past Surgical History:   Procedure Laterality Date    HX APPENDECTOMY  1976    HX ROTATOR CUFF REPAIR       Patient Active Problem List   Diagnosis Code    Hypertension I10    CAD (coronary artery disease) I25.10    Hyperlipidemia E78.5    Hip pain M25.559    Chest pain R07.9    COPD (chronic obstructive pulmonary disease) ([facility]) J44.9    Tobacco abuse Z72.0    DVT (deep venous thrombosis) ([facility]) I82.409    PNA (pneumonia) J18.9    Pain of foot due to ischemia when walking ([facility]) I73.9    [facility] I96   131.

## 2017-11-21 NOTE — PROGRESS NOTES
Assumed care of patient, patient in room with aide, being shaved. Npo due to angiogram. SBar report received from [place].       1349: Sbar report Abiel Hewitt. Doppler pulse on right and left legs. Dressing on left groin . , at noon . Bedrest over at 230. [facility]

## 2017-11-21 NOTE — PROGRESS NOTES
Daily Progress Note: 11/21/2017 4:46 PM   Admit Date: 11/17/2017    Patient seen in follow up for multiple medical problems as listed below:  Patient Active Problem List   Diagnosis Code    Hypertension I10    CAD (coronary artery disease) I25.10    Hyperlipidemia E78.5    Hip pain M25.559    Chest pain R07.9    COPD (chronic obstructive pulmonary disease) ([facility]) J44.9    Tobacco abuse Z72.0    DVT (deep venous thrombosis) ([facility]) I82.409    PNA (pneumonia) J18.9    Pain of foot due to ischemia when walking ([facility]) I73.9    Gangrene ([facility] [address] I96       Assesment     Admitted with Fever, Altered mental status and also TAD. Found to have stage 4 ulcers on R foot and and ulceration top of left foot, ischemia R heal. Cardiology and vascular consulted. Incidental finding of elevated troponin with normal CK. Podiatry consulted, recommend better vascular supply prior to any foot surgery s/p angiography and fempop bypass 11/21. No wound cultures. - R and L foot ulcers and gangrene R heal - Podiatry and vascular consulting. On IV Abx  - Elevated troponin: in setting of physiologic stress, Fevers, TAD and some sort of infection. No CP. Doubt true angina or ACS at this time. - Fever, Leukocytosis: SIRS  - CAD s/p multiple stents to RCA , Most recent Cath 4/7/12 (Dr. Frederick Vogt) 3 vessel CAD with nonciritcal stenosis in the LAD and Cx with total occlusion of the very proximal RCA site of prior stenting, repeat stenting of RCA  - HTN  - HLD  - DM  - COPD  - PAD  - Tobacco abuse  - DVT  - H/O Non-compliance per previous chart and records    DVT Protocol Active: Y  Code Status:  Full Code     Disposition: Req 72h hosp    Subjective:     CC: Shortness of Breath    Interval History: s/p vascular intervention the patient is in good spirits. Need podiatry plan for definitive dispo    ROS: 11 point ROS negative     Objective:     Visit Vitals    /63 (BP 1 Location: Right arm, BP Patient Position:  At rest;Supine;Reverse Trendelenburg)    Pulse 63    Temp 97.9 °F (36.6 °C)    Resp 16    Ht 5' 9\" (1.753 m)    Wt 76.4 kg (168 lb 6.4 oz)    SpO2 96%    BMI 24.87 kg/m2       Temp (24hrs), Av.9 °F (36.6 °C), Min:97.9 °F (36.6 °C), Max:97.9 °F (36.6 °C)        Intake/Output Summary (Last 24 hours) at 17 1422  Last data filed at 17 1330   Gross per 24 hour   Intake              240 ml   Output             2200 ml   Net            -1960 ml       Gen: AOx3, NAD  HEENT:  TERI, EOMI. Neck: No Bruits/JVD   Lungs:   CTAB. Good respiratory effort  Heart:   RR S1 S2 without M/R/G  Abdomen: ND,NT, BSX4,   Extremities:   No LE edema. R gangrene heal. R wound with multiple bandages on top and bottom. Small abrasion top of L foot. Skin:  no jaundice.  See above      Data Review:     Meds/Labs/Tests reviewed    Current Shift:  701 - 1900  In: 240 [P.O.:240]  Out: 950 [Urine:950]  Last three shifts:  1901 - 700  In: -   Out: 3050 [Urine:3050]  Recent Labs      17   0349  17   0710   WBC  11.7  13.4*   RBC  2.82*  2.60*   HGB  8.1*  7.6*   HCT  25.4*  23.5*   PLT  378  317   GRANS  85*  87*   LYMPH  8*  6*   EOS  2  2       Recent Labs      17   0400  17   0349  17   0710   BUN  35*  35*  44*   CREA  1.66*  1.64*  2.02*   CA  7.4*  7.4*  7.7*   ALB   --   1.8*  1.9*   K  4.1  3.9  4.8   NA  142  140  138   CL  107  105  106   CO2  28  26  24   GLU  153*  132*  140*        Lab Results   Component Value Date/Time    Glucose 153 2017 04:00 AM    Glucose 132 2017 03:49 AM    Glucose 140 2017 07:10 AM    Glucose 112 2017 06:06 AM    Glucose 136 2017 01:17 PM          Care coordination with Nursing/Consultants/staff: 15  Prior history, labs, and charting reviewed: 15    Procedures/Imaging:  Angiography LE  -   [place] ANGIO AORTOGRAM ABD SERIAL [91064 (CPT®)]      IR ANGIO EXT LOWER RT [[id number]]     ATHERECTOMY PERCUT,FEM-POP [49315 (CPT®)]     IR ANGIOPLASTY POPLITEAL FEM [[id number]]         Total time spent with chart review, patient examination/education, discussion with staff on case,documentation and medication management / adjustment  :  30 Minutes      Dr Vera Ovalle  [phone]

## 2017-11-21 NOTE — PROGRESS NOTES
Patient now allowing me to take vitals, do morning  Accuchk, change fentanyl patch and medicate for pain in the foot. Patient aware of NPO status. Call bell within reach.   Patient Vitals for the past 12 hrs:   Temp Pulse Resp BP SpO2   11/21/17 0625 97.9 °F (36.6 °C) 66 16 162/69 99 %

## 2017-11-21 NOTE — PROGRESS NOTES
[facility]:    TRANSFER - IN REPORT:    Telephone report received from Atilio (name) on Korey Gtz  being received from vascular lab (unit) for routine post - op      Report consisted of patients Situation, Background, Assessment and   Recommendations(SBAR). Information from the following report(s) SBAR, Procedure Summary, MAR and Cardiac Rhythm NSR was reviewed with the receiving nurse. Opportunity for questions and clarification was provided.

## 2017-11-21 NOTE — ROUTINE PROCESS
Report given to oncoming nurse Sarah Davis, RN by offgoing nurse Mamta Griffith RN. Patient resting in bed with no signs or symptoms of discomfort or distress. Call bell in reach.

## 2017-11-21 NOTE — ROUTINE PROCESS
TRANSFER - OUT REPORT:    Verbal report given to RN Alvin Reyna(name) on Chari Calvert  being transferred to 2800(unit) for routine progression of care       Report consisted of patients Situation, Background, Assessment and   Recommendations(SBAR). Information from the following report(s) SBAR was reviewed with the receiving nurse. Lines:   Peripheral IV 11/17/17 Right Antecubital (Active)   Site Assessment Clean, dry, & intact 11/20/2017  7:00 PM   Phlebitis Assessment 0 11/20/2017  7:00 PM   Infiltration Assessment 0 11/20/2017  7:00 PM   Dressing Status Clean, dry, & intact 11/20/2017  7:00 PM   Dressing Type Transparent;Tape 11/20/2017  7:00 PM   Hub Color/Line Status Pink 11/20/2017  7:00 PM   Action Taken Open ports on tubing capped 11/20/2017  7:00 PM   Alcohol Cap Used Yes 11/20/2017  7:00 PM       Peripheral IV 11/18/17 Right Forearm (Active)   Site Assessment Clean, dry, & intact 11/20/2017  7:00 PM   Phlebitis Assessment 0 11/20/2017  7:00 PM   Infiltration Assessment 0 11/20/2017  7:00 PM   Dressing Status Clean, dry, & intact 11/20/2017  7:00 PM   Dressing Type Transparent;Tape 11/20/2017  7:00 PM   Hub Color/Line Status Blue; Infusing 11/20/2017  7:00 PM   Action Taken Open ports on tubing capped 11/20/2017  7:00 PM   Alcohol Cap Used Yes 11/20/2017  7:00 PM        Opportunity for questions and clarification was provided.       Patient transported with:   Horace Cuenca

## 2017-11-22 ENCOUNTER — ANESTHESIA (OUTPATIENT)
Dept: SURGERY | Age: 67
DRG: 270 | End: 2017-11-22
Payer: MEDICARE

## 2017-11-22 ENCOUNTER — ANESTHESIA EVENT (OUTPATIENT)
Dept: SURGERY | Age: 67
DRG: 270 | End: 2017-11-22
Payer: MEDICARE

## 2017-11-22 LAB
ANION GAP SERPL CALC-SCNC: 9 MMOL/L (ref 3–18)
BUN SERPL-MCNC: 35 MG/DL (ref 7–18)
BUN/CREAT SERPL: 22 (ref 12–20)
CALCIUM SERPL-MCNC: 7.8 MG/DL (ref 8.5–10.1)
CHLORIDE SERPL-SCNC: 106 MMOL/L (ref 100–108)
CO2 SERPL-SCNC: 26 MMOL/L (ref 21–32)
CREAT SERPL-MCNC: 1.56 MG/DL (ref 0.6–1.3)
GLUCOSE BLD STRIP.AUTO-MCNC: 121 MG/DL (ref 70–110)
GLUCOSE BLD STRIP.AUTO-MCNC: 139 MG/DL (ref 70–110)
GLUCOSE BLD STRIP.AUTO-MCNC: 141 MG/DL (ref 70–110)
GLUCOSE BLD STRIP.AUTO-MCNC: 152 MG/DL (ref 70–110)
GLUCOSE BLD STRIP.AUTO-MCNC: 174 MG/DL (ref 70–110)
GLUCOSE SERPL-MCNC: 138 MG/DL (ref 74–99)
POTASSIUM SERPL-SCNC: 3.8 MMOL/L (ref 3.5–5.5)
SODIUM SERPL-SCNC: 141 MMOL/L (ref 136–145)

## 2017-11-22 PROCEDURE — 74011250636 HC RX REV CODE- 250/636

## 2017-11-22 PROCEDURE — 76060000033 HC ANESTHESIA 1 TO 1.5 HR: Performed by: PODIATRIST

## 2017-11-22 PROCEDURE — 0LBV0ZZ EXCISION OF RIGHT FOOT TENDON, OPEN APPROACH: ICD-10-PCS | Performed by: PODIATRIST

## 2017-11-22 PROCEDURE — 77030032490 HC SLV COMPR SCD KNE COVD -B: Performed by: PODIATRIST

## 2017-11-22 PROCEDURE — 77030022668 HC TIP SUC IRR PULS STRY –B: Performed by: PODIATRIST

## 2017-11-22 PROCEDURE — 87075 CULTR BACTERIA EXCEPT BLOOD: CPT | Performed by: INTERNAL MEDICINE

## 2017-11-22 PROCEDURE — 74011250636 HC RX REV CODE- 250/636: Performed by: ANESTHESIOLOGY

## 2017-11-22 PROCEDURE — 65660000000 HC RM CCU STEPDOWN

## 2017-11-22 PROCEDURE — 74011000250 HC RX REV CODE- 250

## 2017-11-22 PROCEDURE — 74011000250 HC RX REV CODE- 250: Performed by: PODIATRIST

## 2017-11-22 PROCEDURE — 77030020263 HC SOL INJ SOD CL0.9% LFCR 1000ML

## 2017-11-22 PROCEDURE — 87077 CULTURE AEROBIC IDENTIFY: CPT | Performed by: INTERNAL MEDICINE

## 2017-11-22 PROCEDURE — 77030019934 HC DRSG VAC ASST KCON -B: Performed by: PODIATRIST

## 2017-11-22 PROCEDURE — 76010000161 HC OR TIME 1 TO 1.5 HR INTENSV-TIER 1: Performed by: PODIATRIST

## 2017-11-22 PROCEDURE — 0JBQ0ZZ EXCISION OF RIGHT FOOT SUBCUTANEOUS TISSUE AND FASCIA, OPEN APPROACH: ICD-10-PCS | Performed by: PODIATRIST

## 2017-11-22 PROCEDURE — 74011250636 HC RX REV CODE- 250/636: Performed by: PODIATRIST

## 2017-11-22 PROCEDURE — 74011250637 HC RX REV CODE- 250/637: Performed by: INTERNAL MEDICINE

## 2017-11-22 PROCEDURE — 74011250636 HC RX REV CODE- 250/636: Performed by: INTERNAL MEDICINE

## 2017-11-22 PROCEDURE — 80048 BASIC METABOLIC PNL TOTAL CA: CPT | Performed by: INTERNAL MEDICINE

## 2017-11-22 PROCEDURE — 77030019952 HC CANSTR VAC ASST KCON -B: Performed by: PODIATRIST

## 2017-11-22 PROCEDURE — 77030011640 HC PAD GRND REM COVD -A: Performed by: PODIATRIST

## 2017-11-22 PROCEDURE — 82962 GLUCOSE BLOOD TEST: CPT

## 2017-11-22 PROCEDURE — 77030018717 HC DRSG GRNUFM KCON -B: Performed by: PODIATRIST

## 2017-11-22 PROCEDURE — 76210000006 HC OR PH I REC 0.5 TO 1 HR: Performed by: PODIATRIST

## 2017-11-22 PROCEDURE — 74011000250 HC RX REV CODE- 250: Performed by: ANESTHESIOLOGY

## 2017-11-22 PROCEDURE — 74011250636 HC RX REV CODE- 250/636: Performed by: HOSPITALIST

## 2017-11-22 PROCEDURE — 87186 SC STD MICRODIL/AGAR DIL: CPT | Performed by: INTERNAL MEDICINE

## 2017-11-22 PROCEDURE — 36415 COLL VENOUS BLD VENIPUNCTURE: CPT | Performed by: INTERNAL MEDICINE

## 2017-11-22 PROCEDURE — 77030018836 HC SOL IRR NACL ICUM -A: Performed by: PODIATRIST

## 2017-11-22 PROCEDURE — 87205 SMEAR GRAM STAIN: CPT | Performed by: INTERNAL MEDICINE

## 2017-11-22 PROCEDURE — 74011250637 HC RX REV CODE- 250/637: Performed by: HOSPITALIST

## 2017-11-22 RX ORDER — FAMOTIDINE 10 MG/ML
INJECTION INTRAVENOUS
Status: DISPENSED
Start: 2017-11-22 | End: 2017-11-23

## 2017-11-22 RX ORDER — FENTANYL CITRATE 50 UG/ML
25 INJECTION, SOLUTION INTRAMUSCULAR; INTRAVENOUS AS NEEDED
Status: DISCONTINUED | OUTPATIENT
Start: 2017-11-22 | End: 2017-11-22 | Stop reason: HOSPADM

## 2017-11-22 RX ORDER — SODIUM CHLORIDE 0.9 % (FLUSH) 0.9 %
5-10 SYRINGE (ML) INJECTION EVERY 8 HOURS
Status: DISCONTINUED | OUTPATIENT
Start: 2017-11-22 | End: 2017-11-24

## 2017-11-22 RX ORDER — PROPOFOL 10 MG/ML
INJECTION, EMULSION INTRAVENOUS
Status: DISCONTINUED | OUTPATIENT
Start: 2017-11-22 | End: 2017-11-22 | Stop reason: HOSPADM

## 2017-11-22 RX ORDER — OXYCODONE HYDROCHLORIDE 10 MG/1
10 TABLET ORAL
Qty: 30 TAB | Refills: 0 | Status: SHIPPED | OUTPATIENT
Start: 2017-11-22

## 2017-11-22 RX ORDER — MORPHINE SULFATE 2 MG/ML
2 INJECTION, SOLUTION INTRAMUSCULAR; INTRAVENOUS AS NEEDED
Status: DISCONTINUED | OUTPATIENT
Start: 2017-11-22 | End: 2017-11-22 | Stop reason: HOSPADM

## 2017-11-22 RX ORDER — MORPHINE SULFATE 2 MG/ML
INJECTION, SOLUTION INTRAMUSCULAR; INTRAVENOUS
Status: COMPLETED
Start: 2017-11-22 | End: 2017-11-22

## 2017-11-22 RX ORDER — DEXTROSE 50 % IN WATER (D50W) INTRAVENOUS SYRINGE
25-50 AS NEEDED
Status: DISCONTINUED | OUTPATIENT
Start: 2017-11-22 | End: 2017-11-22 | Stop reason: HOSPADM

## 2017-11-22 RX ORDER — OXYCODONE AND ACETAMINOPHEN 5; 325 MG/1; MG/1
1 TABLET ORAL AS NEEDED
Status: CANCELLED | OUTPATIENT
Start: 2017-11-22

## 2017-11-22 RX ORDER — FENTANYL CITRATE 50 UG/ML
INJECTION, SOLUTION INTRAMUSCULAR; INTRAVENOUS AS NEEDED
Status: DISCONTINUED | OUTPATIENT
Start: 2017-11-22 | End: 2017-11-22 | Stop reason: HOSPADM

## 2017-11-22 RX ORDER — INSULIN LISPRO 100 [IU]/ML
INJECTION, SOLUTION INTRAVENOUS; SUBCUTANEOUS ONCE
Status: DISCONTINUED | OUTPATIENT
Start: 2017-11-22 | End: 2017-11-22 | Stop reason: HOSPADM

## 2017-11-22 RX ORDER — PROPOFOL 10 MG/ML
INJECTION, EMULSION INTRAVENOUS AS NEEDED
Status: DISCONTINUED | OUTPATIENT
Start: 2017-11-22 | End: 2017-11-22 | Stop reason: HOSPADM

## 2017-11-22 RX ORDER — FENTANYL CITRATE 50 UG/ML
INJECTION, SOLUTION INTRAMUSCULAR; INTRAVENOUS AS NEEDED
Status: DISCONTINUED | OUTPATIENT
Start: 2017-11-22 | End: 2017-11-22

## 2017-11-22 RX ORDER — SULFAMETHOXAZOLE AND TRIMETHOPRIM 800; 160 MG/1; MG/1
1 TABLET ORAL 2 TIMES DAILY
Qty: 28 TAB | Refills: 0 | Status: SHIPPED | OUTPATIENT
Start: 2017-11-22 | End: 2017-11-27

## 2017-11-22 RX ORDER — LIDOCAINE HYDROCHLORIDE 20 MG/ML
INJECTION, SOLUTION EPIDURAL; INFILTRATION; INTRACAUDAL; PERINEURAL AS NEEDED
Status: DISCONTINUED | OUTPATIENT
Start: 2017-11-22 | End: 2017-11-22 | Stop reason: HOSPADM

## 2017-11-22 RX ORDER — SODIUM CHLORIDE, SODIUM LACTATE, POTASSIUM CHLORIDE, CALCIUM CHLORIDE 600; 310; 30; 20 MG/100ML; MG/100ML; MG/100ML; MG/100ML
25 INJECTION, SOLUTION INTRAVENOUS CONTINUOUS
Status: DISCONTINUED | OUTPATIENT
Start: 2017-11-22 | End: 2017-11-22 | Stop reason: HOSPADM

## 2017-11-22 RX ORDER — SODIUM CHLORIDE 0.9 % (FLUSH) 0.9 %
5-10 SYRINGE (ML) INJECTION AS NEEDED
Status: DISCONTINUED | OUTPATIENT
Start: 2017-11-22 | End: 2017-11-27 | Stop reason: HOSPADM

## 2017-11-22 RX ORDER — MIDAZOLAM HYDROCHLORIDE 1 MG/ML
INJECTION, SOLUTION INTRAMUSCULAR; INTRAVENOUS AS NEEDED
Status: DISCONTINUED | OUTPATIENT
Start: 2017-11-22 | End: 2017-11-22 | Stop reason: HOSPADM

## 2017-11-22 RX ORDER — MAGNESIUM SULFATE 100 %
4 CRYSTALS MISCELLANEOUS AS NEEDED
Status: DISCONTINUED | OUTPATIENT
Start: 2017-11-22 | End: 2017-11-22 | Stop reason: HOSPADM

## 2017-11-22 RX ORDER — INSULIN LISPRO 100 [IU]/ML
INJECTION, SOLUTION INTRAVENOUS; SUBCUTANEOUS ONCE
Status: ACTIVE | OUTPATIENT
Start: 2017-11-22 | End: 2017-11-23

## 2017-11-22 RX ORDER — SODIUM CHLORIDE, SODIUM LACTATE, POTASSIUM CHLORIDE, CALCIUM CHLORIDE 600; 310; 30; 20 MG/100ML; MG/100ML; MG/100ML; MG/100ML
75 INJECTION, SOLUTION INTRAVENOUS CONTINUOUS
Status: DISPENSED | OUTPATIENT
Start: 2017-11-22 | End: 2017-11-23

## 2017-11-22 RX ORDER — ONDANSETRON 2 MG/ML
4 INJECTION INTRAMUSCULAR; INTRAVENOUS ONCE
Status: DISCONTINUED | OUTPATIENT
Start: 2017-11-22 | End: 2017-11-22 | Stop reason: HOSPADM

## 2017-11-22 RX ADMIN — MORPHINE SULFATE 2 MG: 2 INJECTION, SOLUTION INTRAMUSCULAR; INTRAVENOUS at 15:10

## 2017-11-22 RX ADMIN — MIDAZOLAM HYDROCHLORIDE 1 MG: 1 INJECTION, SOLUTION INTRAMUSCULAR; INTRAVENOUS at 13:34

## 2017-11-22 RX ADMIN — PROPOFOL 10 MG: 10 INJECTION, EMULSION INTRAVENOUS at 14:30

## 2017-11-22 RX ADMIN — FENTANYL CITRATE 25 MCG: 50 INJECTION, SOLUTION INTRAMUSCULAR; INTRAVENOUS at 14:13

## 2017-11-22 RX ADMIN — HYDROCODONE BITARTRATE AND ACETAMINOPHEN 2 TABLET: 5; 325 TABLET ORAL at 11:31

## 2017-11-22 RX ADMIN — PROPOFOL 50 MG: 10 INJECTION, EMULSION INTRAVENOUS at 13:55

## 2017-11-22 RX ADMIN — FENTANYL CITRATE 25 MCG: 50 INJECTION, SOLUTION INTRAMUSCULAR; INTRAVENOUS at 14:50

## 2017-11-22 RX ADMIN — PROPOFOL 50 MCG/KG/MIN: 10 INJECTION, EMULSION INTRAVENOUS at 13:45

## 2017-11-22 RX ADMIN — SODIUM BICARBONATE 650 MG TABLET 650 MG: at 23:34

## 2017-11-22 RX ADMIN — SODIUM CHLORIDE, SODIUM LACTATE, POTASSIUM CHLORIDE, AND CALCIUM CHLORIDE 75 ML/HR: 600; 310; 30; 20 INJECTION, SOLUTION INTRAVENOUS at 13:19

## 2017-11-22 RX ADMIN — METOPROLOL TARTRATE 25 MG: 25 TABLET, FILM COATED ORAL at 23:35

## 2017-11-22 RX ADMIN — FAMOTIDINE 20 MG: 10 INJECTION, SOLUTION INTRAVENOUS at 13:27

## 2017-11-22 RX ADMIN — LIDOCAINE HYDROCHLORIDE 70 MG: 20 INJECTION, SOLUTION EPIDURAL; INFILTRATION; INTRACAUDAL; PERINEURAL at 13:40

## 2017-11-22 RX ADMIN — SODIUM CHLORIDE 1000 MG: 900 INJECTION, SOLUTION INTRAVENOUS at 18:47

## 2017-11-22 RX ADMIN — FENTANYL CITRATE 25 MCG: 50 INJECTION, SOLUTION INTRAMUSCULAR; INTRAVENOUS at 14:57

## 2017-11-22 RX ADMIN — FUROSEMIDE 20 MG: 10 INJECTION, SOLUTION INTRAMUSCULAR; INTRAVENOUS at 11:32

## 2017-11-22 RX ADMIN — MIDAZOLAM HYDROCHLORIDE 1 MG: 1 INJECTION, SOLUTION INTRAMUSCULAR; INTRAVENOUS at 13:40

## 2017-11-22 RX ADMIN — FENTANYL CITRATE 25 MCG: 50 INJECTION, SOLUTION INTRAMUSCULAR; INTRAVENOUS at 14:07

## 2017-11-22 RX ADMIN — GABAPENTIN 400 MG: 400 CAPSULE ORAL at 23:34

## 2017-11-22 RX ADMIN — PROPOFOL 50 MG: 10 INJECTION, EMULSION INTRAVENOUS at 13:40

## 2017-11-22 NOTE — ROUTINE PROCESS
Bedside and Verbal shift change report given to Luana Yu RN by Maria Del Rosario Gomez RN. Report included the following information SBAR, Kardex, Intake/Output and MAR.

## 2017-11-22 NOTE — PROGRESS NOTES
Received patient awake, alert. Patient denies any pain at this time. Bed is locked and in lowest position and call bell is within reach. Not in acute distress.                                          ???????

## 2017-11-22 NOTE — PROGRESS NOTES
Orders received and chart  Reviewed patient currently off the floor for procedure please clarify weight bearing orders called or and left message with Ivana to have Dr Rahul Aguilar put in weight bearing orders thank you.

## 2017-11-22 NOTE — PROGRESS NOTES
Problem: Falls - Risk of  Goal: *Absence of Falls  Document Alban Fall Risk and appropriate interventions in the flowsheet.    Outcome: Progressing Towards Goal  Fall Risk Interventions:  Mobility Interventions: Patient to call before getting OOB, PT Consult for mobility concerns         Medication Interventions: Patient to call before getting OOB    Elimination Interventions: Call light in reach, Patient to call for help with toileting needs, Urinal in reach

## 2017-11-22 NOTE — PROGRESS NOTES
Podiatry Surgery Progress Note    Patient: Korey Gtz MRN: [id number] LOS: 5     YOB: 1950  Age: 78 y.o.   Sex: male        Admit Date: 11/17/2017    POD * No surgery found *  Procedure:   * No surgery found *        Subjective:     Vijaya Fossa a 66 y/o seen at bedside this morning  state over all feeling much better.  Patient is  currently resting quiet and no apparent distress.      Review of Systems:  Other than the above HPI  General: denies chronic fatigue, weight loss,   HEENT: denies ringing in ears, dizzy spells, sinus trouble, hoarseness  GI: denies bloating, heartburn, regurgitation, difficulty and or painful swallowing, nausea  Lungs: denies cough, SOB, hemoptysis  Heart: denies any current chest pain, irregular heart beat  Skin: denies rashes, hives, allergic reaction  Urinary: denies UTI, kidney stones   Bones and Joints: denies back pain, gout, osteoporosis  Neurologic: denies headaches, numbness    Objective:     Vitals  Patient Vitals for the past 8 hrs:   BP Temp Pulse Resp SpO2 Weight   11/22/17 0649 160/70 98.3 °F (36.8 °C) 68 18 94 % -   11/22/17 0635 - - - - - 76.4 kg (168 lb 6.5 oz)       I/O Current Shift       I/O Last Three Shifts  11/20 1901 - 11/22 0700  In: 1020 [P.O.:1020]  Out: 3400 [Urine:3400]      Data Review  Recent Results (from the past 24 hour(s))   GLUCOSE, POC    Collection Time: 11/21/17 11:22 AM   Result Value Ref Range    Glucose (POC) 140 (H) 70 - 110 mg/dL   GLUCOSE, POC    Collection Time: 11/21/17  4:41 PM   Result Value Ref Range    Glucose (POC) 194 (H) 70 - 945 mg/dL   METABOLIC PANEL, BASIC    Collection Time: 11/22/17  3:53 AM   Result Value Ref Range    Sodium 141 136 - 145 mmol/L    Potassium 3.8 3.5 - 5.5 mmol/L    Chloride 106 100 - 108 mmol/L    CO2 26 21 - 32 mmol/L    Anion gap 9 3.0 - 18 mmol/L    Glucose 138 (H) 74 - 99 mg/dL    BUN 35 (H) 7.0 - 18 MG/DL    Creatinine 1.56 (H) 0.6 - 1.3 MG/DL    BUN/Creatinine ratio 22 (H) 12 - 20 GFR est AA 54 (L) >60 ml/min/1.73m2    GFR est non-AA 45 (L) >60 ml/min/1.73m2    Calcium 7.8 (L) 8.5 - 10.1 MG/DL   GLUCOSE, POC    Collection Time: 11/22/17  7:52 AM   Result Value Ref Range    Glucose (POC) 174 (H) 70 - 110 mg/dL       Physical Exam:  Mickey Duval is a 78 y.o. male. Pleasant, alert and oriented x3, in no apparent distress. . Patient is well-developed and nourished, with good attention to hygiene and body habitus. Mood and affect normal, appropriate to situation. Lower Extremity Exam:      VASCULAR EXAM:. Pedal pulses are palpable right. Skin temperature is warm to warmer right and left foot. Digital capillary fill time is 3 seconds right foot. There is edema of the right and left foot and ankle.      NEUROLOGICAL EXAM:. Sensation Intact with palpation right foot.      MUSCULOSKELETAL EXAM:. Muscle tone is normal. Muscle strength of the flexor and extensor group inversion and eversion Bilateral. 4/5.      DERMATOLOGICAL EXAM:. Skin is of abnormal texture and turgor with atrophic skin changes noting hair growth, nail changes (thickening), Bilateral. There is a ulcer full thickness with erythema and exposed EHL tendon dorsal right foot measuring 7.0cm x 2.0 cm with the exposed tendon measuring 1.9 cm. There is also noted gangrene of the posterior plantar aspect of the right heel measuring 5.5cm x 6.0 cm. There is exudate and mild odor.         Wound Presentation:  Wound Foot Anterior;Right;Plantar (Active)   DRESSING STATUS Clean, dry, and intact 11/22/2017 12:46 AM   DRESSING TYPE Foam 11/22/2017 12:46 AM   Pressure Injury Unstageable 11/20/2017 10:15 AM   Drainage Amount  None 11/22/2017 12:46 AM   Drainage Color Serosanguinous 11/20/2017 10:15 AM   Wound Odor None 11/22/2017 12:46 AM   Periwound Skin Condition Other (comment) 11/20/2017 10:15 AM   Cleansing and Cleansing Agents  Dermal wound cleanser 11/20/2017 10:15 AM   Dressing Type Applied Foam 11/20/2017 10:15 AM   Number of days:5 Wound Heel (Active)   DRESSING STATUS Clean, dry, and intact 11/22/2017 12:46 AM   DRESSING TYPE Foam 11/22/2017 12:46 AM   Drainage Amount  None 11/22/2017 12:46 AM   Drainage Color Serosanguinous 11/20/2017 10:15 AM   Wound Odor None 11/22/2017 12:46 AM   Periwound Skin Condition Other (comment) 11/20/2017 10:15 AM   Cleansing and Cleansing Agents  Dermal wound cleanser 11/20/2017 10:15 AM   Dressing Type Applied Foam 11/20/2017 10:15 AM   Procedure Tolerated Well 11/20/2017 10:15 AM   Number of days:2              Assessment:     Active Problems:    Pain of foot due to ischemia when walking ([address] (11/17/2017)      Gangrene ([address] (11/19/2017)          Plan/Recommendations/Medical Decision Making:       Discussed with the patient all the above findings and overall treatment / care program after he has vascular procedure. All questions answered and he indicated that he understood.     Continue present treatment LWC and ABx.      Will set up for surgical debridement of necrotic tissue.        Dr [facility]  C) [phone]  11/22/2017  8:49 AM

## 2017-11-22 NOTE — ANESTHESIA POSTPROCEDURE EVALUATION
Post-Anesthesia Evaluation and Assessment    Patient: Alina Madrid MRN: [id number]  SSN: [id number]    YOB: 1950  Age: 78 y.o. Sex: male       Cardiovascular Function/Vital Signs  Visit Vitals    /54    Pulse (!) 59    Temp 36.2 °C (97.2 °F)    Resp 16    Ht 5' 9.5\" (1.765 m)    Wt 76.2 kg (168 lb)    SpO2 92%    BMI 24.45 kg/m2       Patient is status post MAC anesthesia for Procedure(s):  RIGHT FOOT DEBRIDEMENT AND INCISION AND DRAINAGE WITH APPLICATION OF WOUND VAC. Mary Ellenann Ganulysses Nausea/Vomiting: None    Postoperative hydration reviewed and adequate. Pain:  Pain Scale 1: Numeric (0 - 10) (11/22/17 1527)  Pain Intensity 1: 5 (11/22/17 1527)   Managed    Neurological Status:   Neuro (WDL): Within Defined Limits (11/22/17 1502)  Neuro  Neurologic State: Alert (11/22/17 0805)  Orientation Level: Oriented X4 (11/22/17 0805)  Cognition: Follows commands (11/22/17 0805)  Speech: Slurred (11/22/17 0805)   At baseline    Mental Status and Level of Consciousness: Arousable    Pulmonary Status:   O2 Device: Room air (11/22/17 1502)   Adequate oxygenation and airway patent    Complications related to anesthesia: None    Post-anesthesia assessment completed.  No concerns    Signed By: Harish Polo MD     November 22, 2017

## 2017-11-22 NOTE — PROGRESS NOTES
Daily Progress Note: 11/22/2017 4:46 PM   Admit Date: 11/17/2017    Patient seen in follow up for multiple medical problems as listed below:  Patient Active Problem List   Diagnosis Code    Hypertension I10    CAD (coronary artery disease) I25.10    Hyperlipidemia E78.5    Hip pain M25.559    Chest pain R07.9    COPD (chronic obstructive pulmonary disease) ([facility]) J44.9    Tobacco abuse Z72.0    DVT (deep venous thrombosis) ([facility]) I82.409    PNA (pneumonia) J18.9    Pain of foot due to ischemia when walking ([facility]) I73.9    Gangrene ([facility] [address] I96       Assesment     Admitted with Fever, Altered mental status and also TAD. Found to have stage 4 ulcers on R foot and and ulceration top of left foot, ischemia R heal. Cardiology and vascular consulted. Incidental finding of elevated troponin with normal CK. Podiatry consulted, recommend better vascular supply prior to any foot surgery s/p angiography and fempop bypass 11/21. No wound cultures. - R and L foot ulcers and gangrene R heal - Podiatry and vascular consulting. On IV Abx. Debridement 11/22 Dr Lolly Calero  - Elevated troponin: in setting of physiologic stress, Fevers, TAD and some sort of infection. No CP. No ACS at this time. - Fever, Leukocytosis: SIRS  - Chronic systolic/diastolic cardiomyopathy EF 45 % to 50 %. There was mild diffuse hypokinesis. grade 1 diastolic dysfunction 45/33  - CAD s/p multiple stents to RCA , Most recent Cath 4/7/12 (Dr. Sandi Sauceda) 3 vessel CAD with nonciritcal stenosis in the LAD and Cx with total occlusion of the very proximal RCA site of prior stenting, repeat stenting of RCA  - HTN  - HLD  - DM  - COPD  - PAD  - Tobacco abuse  - DVT  - H/O Non-compliance per previous chart and records    DVT Protocol Active: Y  Code Status:  Full Code     Disposition: Req 24h. wound vac need? Subjective:     CC: Shortness of Breath    Interval History: for wound clean out today with Dr Lolly Calero.  May need wound vac at home??    ROS: 11 point ROS -lots of pain    Objective:     Visit Vitals    /58 (BP 1 Location: Right arm, BP Patient Position: At rest)    Pulse 63    Temp 97.6 °F (36.4 °C)    Resp 15    Ht 5' 9.5\" (1.765 m)    Wt 76.2 kg (168 lb)    SpO2 94%    BMI 24.45 kg/m2       Temp (24hrs), Av.9 °F (36.6 °C), Min:97.2 °F (36.2 °C), Max:98.3 °F (36.8 °C)        Intake/Output Summary (Last 24 hours) at 17 1542  Last data filed at 17 1541   Gross per 24 hour   Intake              930 ml   Output             1100 ml   Net             -170 ml       Gen: AOx3, NAD  HEENT:  TERI, EOMI. Neck: No Bruits/JVD   Lungs:   CTAB. Good respiratory effort  Heart:   RR S1 S2 without M/R/G  Abdomen: ND,NT, BSX4,   Extremities:   No LE edema. R gangrene heal. R wound with multiple bandages on top and bottom. Small abrasion top of L foot. Skin:  no jaundice.  See above      Data Review:     Meds/Labs/Tests reviewed    Current Shift:   07 - 1900  In: 150 [I.V.:150]  Out: 500 [Urine:500]  Last three shifts:  1901 -  0700  In: 1020 [P.O.:1020]  Out: 3400 [Urine:3400]  Recent Labs      17   0349   WBC  11.7   RBC  2.82*   HGB  8.1*   HCT  25.4*   PLT  378   GRANS  85*   LYMPH  8*   EOS  2       Recent Labs      17   0353  17   0400  17   0349   BUN  35*  35*  35*   CREA  1.56*  1.66*  1.64*   CA  7.8*  7.4*  7.4*   ALB   --    --   1.8*   K  3.8  4.1  3.9   NA  141  142  140   CL  106  107  105   CO2  26  28  26   GLU  138*  153*  132*        Lab Results   Component Value Date/Time    Glucose 138 2017 03:53 AM    Glucose 153 2017 04:00 AM    Glucose 132 2017 03:49 AM    Glucose 140 2017 07:10 AM    Glucose 112 2017 06:06 AM          Care coordination with Nursing/Consultants/staff: 15  Prior history, labs, and charting reviewed: 15    Procedures/Imaging:  Angiography LE  -   [place] ANGIO AORTOGRAM ABD SERIAL [00433 (CPT®)]      IR ANGIO EXT LOWER RT [[id number]]     ATHERECTOMY PERCUT,FEM-POP [30321 (CPT®)]     IR ANGIOPLASTY POPLITEAL FEM [[id number]]     11/22 I&D and debridement of necrotic tissue    Total time spent with chart review, patient examination/education, discussion with staff on case,documentation and medication management / adjustment  :  30 Minutes      Dr Rick Mai  [phone]

## 2017-11-22 NOTE — PERIOP NOTES
TRANSFER - OUT REPORT:    Verbal report given to jose d christensen(name) on Martha Zimmer  being transferred to 2111(unit) for routine post - op       Report consisted of patients Situation, Background, Assessment and   Recommendations(SBAR). Information from the following report(s) SBAR, OR Summary, Intake/Output and MAR was reviewed with the receiving nurse. Lines:   Peripheral IV 11/17/17 Right Antecubital (Active)   Site Assessment Drainage (comment) 11/22/2017  1:19 PM   Phlebitis Assessment 0 11/22/2017  1:19 PM   Infiltration Assessment 0 11/22/2017  1:19 PM   Dressing Status Old drainage;New drainage 11/22/2017  1:19 PM   Dressing Type Tape;Transparent 11/22/2017  1:19 PM   Hub Color/Line Status Pink;Capped 11/22/2017  1:19 PM   Action Taken Open ports on tubing capped 11/22/2017  1:19 PM   Alcohol Cap Used Yes 11/22/2017  1:19 PM       Peripheral IV 11/18/17 Right Forearm (Active)   Site Assessment Clean, dry, & intact 11/22/2017  1:19 PM   Phlebitis Assessment 0 11/22/2017  1:19 PM   Infiltration Assessment 0 11/22/2017  1:19 PM   Dressing Status Clean, dry, & intact 11/22/2017  1:19 PM   Dressing Type Tape;Transparent 11/22/2017  1:19 PM   Hub Color/Line Status Blue; Infusing 11/22/2017  1:19 PM   Action Taken Open ports on tubing capped 11/22/2017  1:19 PM   Alcohol Cap Used Yes 11/22/2017  1:19 PM        Opportunity for questions and clarification was provided.       Patient transported with:   [employer]

## 2017-11-22 NOTE — PROGRESS NOTES
1530-  Assumed care. Report received. Meds, labs, orders, notes and plan of care reviewed. Pt resting comfortably in bed. Denies pain or complaint. Assessment completed. 1900-  Pt asleep. No interval changes. VS stable. Denies pain or complaint.

## 2017-11-22 NOTE — PERIOP NOTES
Y [employer]ctor for Wound Vac has . Doctor aware and not concerned as it is away from the patient's wound.

## 2017-11-22 NOTE — MANAGEMENT PLAN
Discharge Planning    Reviewed chart. Pt has Medicaid # that is not in system. Roxana Plane # [id number]. Sent to registration. Pt lives at [place] and likely will just return there. On admission, pt asked Care Management to be matched to other facilities for long term care. He has accepting beds at other facilities. Would need to verify it is acceptance for LONG TERM care. If no other accepting bed, will return to [place].  Verbal order, read back for PT/OT from Dr Andrade Piña RN BSN  Outcomes Manager  Pager # [phone]

## 2017-11-22 NOTE — ANESTHESIA PREPROCEDURE EVALUATION
Anesthetic History   No history of anesthetic complications            Review of Systems / Medical History  Patient summary reviewed and pertinent labs reviewed    Pulmonary    COPD               Neuro/Psych       CVA       Cardiovascular    Hypertension          Past MI and CAD    Exercise tolerance: <4 METS     GI/Hepatic/Renal  Within defined limits              Endo/Other  Within defined limits  Diabetes: type 2         Other Findings   Comments:   Risk Factors for Postoperative nausea/vomiting:       History of postoperative nausea/vomiting? NO       Female? NO       Motion sickness? NO       Intended opioid administration for postoperative analgesia? YES      Smoking Abstinence  Current Smoker? YES  Elective Surgery? YES  Seen preoperatively by anesthesiologist or proxy prior to day of surgery? YES  Pt abstained from smoking 24 hours prior to anesthesia? YES               Physical Exam    Airway  Mallampati: II  TM Distance: 4 - 6 cm  Neck ROM: normal range of motion   Mouth opening: Normal     Cardiovascular  Regular rate and rhythm,  S1 and S2 normal,  no murmur, click, rub, or gallop  Rhythm: regular  Rate: normal         Dental    Dentition: Poor dentition  Comments:  Only a few teeth remain, few remaining teeth extremely loose at risk for injury   Pulmonary  Breath sounds clear to auscultation               Abdominal  GI exam deferred       Other Findings            Anesthetic Plan    ASA: 3  Anesthesia type: MAC          Induction: Intravenous  Anesthetic plan and risks discussed with: Patient

## 2017-11-22 NOTE — PROGRESS NOTES
OT order received and chart reviewed. 1330: pt off the floor. 1455: pt in OR; will need updated activity level & weightbearing orders prior to completing OT evaluation. Thank you. Will follow up.     Cherry Bolden MS OTR/L  Office Ext: [phone]  Pager: [phone]

## 2017-11-22 NOTE — PROGRESS NOTES
Problem: General Medical Care Plan    Goal: *Vital signs within specified parameters  Outcome: Not Progressing Towards Goal  Pt refusing to allow staff to obtain vital signs per protocol    Goal: *Skin integrity maintained  Outcome: Not Progressing Towards Goal  Podiatry following. No wound care orders currently. Will inform on-coming RN to discuss with MD    Goal: *Anxiety reduced or absent  Outcome: Not Progressing Towards Goal  Pt cursing at staff, required coaching on appropriate behavior frequently  Problem: Diabetes Self-Management    Goal: *Disease process and treatment process  Define diabetes and identify own type of diabetes; list 3 options for treating diabetes. Outcome: Not Progressing Towards Goal  Pt refusing to let staff check blood glucose as ordered by MD    Goal: *Incorporating physical activity into lifestyle  Outcome: Not Progressing Towards Goal  Pt on bed rest    Goal: *Developing strategies to promote health/change behavior  Outcome: Not Progressing Towards Goal  Pt non-compliant in treatment    Problem: Falls - Risk of    Goal: *Absence of Falls  Document Alban Fall Risk and appropriate interventions in the flowsheet.    Outcome: Progressing Towards Goal  Fall Risk Interventions:  Mobility Interventions: Communicate number of staff needed for ambulation/transfer, PT Consult for mobility concerns, PT Consult for assist device competence, Patient to call before getting OOB, OT consult for ADLs     Medication Interventions: Patient to call before getting OOB, Teach patient to arise slowly    Elimination Interventions: Call light in reach, Toileting schedule/hourly rounds

## 2017-11-22 NOTE — PROGRESS NOTES
Patient seen in Pre-op holding. Chart reviewed and he was re-evalauated. NO change in H&P. Treatment plan: Once again reviewed all above findings. Discussed the surgical procedure and overall treatment plan. Marked foot. Patient cleared for planned procedures.

## 2017-11-22 NOTE — BRIEF OP NOTE
BRIEF OPERATIVE NOTE    Date of Procedure: 11/22/2017   Preoperative Diagnosis: right foot foot gangrene with necrotic tissue. Postoperative Diagnosis:   right foot foot gangrene with necrotic tissue. Procedure(s):  RIGHT FOOT DEBRIDEMENT AND INCISION AND DRAINAGE, cutting await necrotic tissue and application of the wound VAC   Surgeon(s) and Role:     * Joselyn Don DPM - Primary         Assistant Staff:       Surgical Staff:  Circ-1: Marianne Tinoco RN  Scrub Tech-1: Ronald Yañez  Scrub Tech-2: Priyanka Jaquez  Surg Asst-1: Ramon Mo  Event Time In   Incision Start [phone]   Incision Close      Anesthesia: General   Estimated Blood Loss: minimal  Specimens:   ID Type Source Tests Collected by Time Destination   1 : Dorsal-Medial wound of right foot Wound Foot, Right CULTURE, ANAEROBIC AND AEROBIC, GRAM STAIN Joselyn Don DPM 11/22/2017 1415 Microbiology      Findings: necrotic tissue    Complications: none  Implants: none    Patient tolerated all procedures well and discharged in stable condition with wound VAC on. Tourniquet never used.

## 2017-11-23 LAB
ANION GAP SERPL CALC-SCNC: 7 MMOL/L (ref 3–18)
BACTERIA SPEC CULT: NORMAL
BACTERIA SPEC CULT: NORMAL
BUN SERPL-MCNC: 33 MG/DL (ref 7–18)
BUN/CREAT SERPL: 21 (ref 12–20)
CALCIUM SERPL-MCNC: 7.4 MG/DL (ref 8.5–10.1)
CHLORIDE SERPL-SCNC: 109 MMOL/L (ref 100–108)
CO2 SERPL-SCNC: 27 MMOL/L (ref 21–32)
CREAT SERPL-MCNC: 1.54 MG/DL (ref 0.6–1.3)
DATE LAST DOSE: NORMAL
GLUCOSE BLD STRIP.AUTO-MCNC: 162 MG/DL (ref 70–110)
GLUCOSE BLD STRIP.AUTO-MCNC: 186 MG/DL (ref 70–110)
GLUCOSE BLD STRIP.AUTO-MCNC: 206 MG/DL (ref 70–110)
GLUCOSE SERPL-MCNC: 160 MG/DL (ref 74–99)
POTASSIUM SERPL-SCNC: 4.3 MMOL/L (ref 3.5–5.5)
REPORTED DOSE,DOSE: NORMAL UNITS
REPORTED DOSE/TIME,TMG: 1600
SERVICE CMNT-IMP: NORMAL
SERVICE CMNT-IMP: NORMAL
SODIUM SERPL-SCNC: 143 MMOL/L (ref 136–145)
VANCOMYCIN TROUGH SERPL-MCNC: 17.6 UG/ML (ref 10–20)

## 2017-11-23 PROCEDURE — 74011250637 HC RX REV CODE- 250/637: Performed by: INTERNAL MEDICINE

## 2017-11-23 PROCEDURE — 74011636637 HC RX REV CODE- 636/637: Performed by: HOSPITALIST

## 2017-11-23 PROCEDURE — 80048 BASIC METABOLIC PNL TOTAL CA: CPT | Performed by: PODIATRIST

## 2017-11-23 PROCEDURE — 74011250636 HC RX REV CODE- 250/636: Performed by: INTERNAL MEDICINE

## 2017-11-23 PROCEDURE — 74011250636 HC RX REV CODE- 250/636: Performed by: HOSPITALIST

## 2017-11-23 PROCEDURE — 82962 GLUCOSE BLOOD TEST: CPT

## 2017-11-23 PROCEDURE — 77030020255 HC SOL INJ LR 1000ML BG

## 2017-11-23 PROCEDURE — 80202 ASSAY OF VANCOMYCIN: CPT | Performed by: PODIATRIST

## 2017-11-23 PROCEDURE — 97166 OT EVAL MOD COMPLEX 45 MIN: CPT

## 2017-11-23 PROCEDURE — 65660000000 HC RM CCU STEPDOWN

## 2017-11-23 PROCEDURE — 74011250637 HC RX REV CODE- 250/637: Performed by: HOSPITALIST

## 2017-11-23 PROCEDURE — 36415 COLL VENOUS BLD VENIPUNCTURE: CPT | Performed by: PODIATRIST

## 2017-11-23 PROCEDURE — 74011250636 HC RX REV CODE- 250/636: Performed by: ANESTHESIOLOGY

## 2017-11-23 RX ADMIN — BISACODYL 10 MG: 10 SUPPOSITORY RECTAL at 09:17

## 2017-11-23 RX ADMIN — Medication 10 ML: at 13:07

## 2017-11-23 RX ADMIN — LEVOFLOXACIN 750 MG: 750 TABLET, FILM COATED ORAL at 09:23

## 2017-11-23 RX ADMIN — INSULIN LISPRO 2 UNITS: 100 INJECTION, SOLUTION INTRAVENOUS; SUBCUTANEOUS at 09:28

## 2017-11-23 RX ADMIN — LOSARTAN POTASSIUM 100 MG: 50 TABLET ORAL at 09:25

## 2017-11-23 RX ADMIN — SODIUM BICARBONATE 650 MG TABLET 650 MG: at 09:23

## 2017-11-23 RX ADMIN — HYDROCODONE BITARTRATE AND ACETAMINOPHEN 2 TABLET: 5; 325 TABLET ORAL at 09:24

## 2017-11-23 RX ADMIN — Medication 10 ML: at 09:31

## 2017-11-23 RX ADMIN — METOPROLOL TARTRATE 25 MG: 25 TABLET, FILM COATED ORAL at 09:26

## 2017-11-23 RX ADMIN — FUROSEMIDE 20 MG: 10 INJECTION, SOLUTION INTRAMUSCULAR; INTRAVENOUS at 09:24

## 2017-11-23 RX ADMIN — DULOXETINE 60 MG: 60 CAPSULE, DELAYED RELEASE ORAL at 09:23

## 2017-11-23 RX ADMIN — ASPIRIN 81 MG: 81 TABLET, COATED ORAL at 09:24

## 2017-11-23 RX ADMIN — TAMSULOSIN HYDROCHLORIDE 0.4 MG: 0.4 CAPSULE ORAL at 09:25

## 2017-11-23 RX ADMIN — SODIUM CHLORIDE, SODIUM LACTATE, POTASSIUM CHLORIDE, AND CALCIUM CHLORIDE 75 ML/HR: 600; 310; 30; 20 INJECTION, SOLUTION INTRAVENOUS at 05:40

## 2017-11-23 RX ADMIN — MIRABEGRON 25 MG: 25 TABLET, FILM COATED, EXTENDED RELEASE ORAL at 09:27

## 2017-11-23 RX ADMIN — HYDROCODONE BITARTRATE AND ACETAMINOPHEN 2 TABLET: 5; 325 TABLET ORAL at 00:30

## 2017-11-23 NOTE — PROGRESS NOTES
Daily Progress Note: 11/23/2017 4:46 PM   Admit Date: 11/17/2017    Patient seen in follow up for multiple medical problems as listed below:  Patient Active Problem List   Diagnosis Code    Hypertension I10    CAD (coronary artery disease) I25.10    Hyperlipidemia E78.5    Hip pain M25.559    Chest pain R07.9    COPD (chronic obstructive pulmonary disease) ([facility]) J44.9    Tobacco abuse Z72.0    DVT (deep venous thrombosis) ([facility]) I82.409    PNA (pneumonia) J18.9    Pain of foot due to ischemia when walking ([facility]) I73.9    Gangrene ([facility] [address] I96       Assesment     Admitted with Fever, Altered mental status and also TAD. Found to have stage 4 ulcers on R foot and and ulceration top of left foot, ischemia R heal. Cardiology and vascular consulted. Incidental finding of elevated troponin with normal CK. Podiatry consulted, recommend better vascular supply prior to any foot surgery s/p angiography and fempop bypass 11/21. No wound cultures. S/p  I&D and debridement of necrotic tissue 11/22 Dr Mari Aldrich. Awaiting wound Vac and antibiotic plans.     - R and L foot ulcers and gangrene R heal - Podiatry and vascular consulting. On IV Abx. Debridement 11/22 Dr Mari Aldrich  - Elevated troponin: in setting of physiologic stress, Fevers, TAD and some sort of infection. No CP. No ACS at this time. - Fever, Leukocytosis: SIRS  - Chronic systolic/diastolic cardiomyopathy EF 45 % to 50 %. There was mild diffuse hypokinesis. grade 1 diastolic dysfunction 83/61  - CAD s/p multiple stents to RCA , Most recent Cath 4/7/12 (Dr. Ivon Tiwari) 3 vessel CAD with nonciritcal stenosis in the LAD and Cx with total occlusion of the very proximal RCA site of prior stenting, repeat stenting of RCA  - HTN  - HLD  - DM  - COPD  - PAD  - Tobacco abuse  - DVT  - H/O Non-compliance per previous chart and records    DVT Protocol Active: Y  Code Status:  Full Code     Disposition: Req 24h. wound vac need?  Return to [facility] with wound vac and antibiotics    Subjective:     CC: Shortness of Breath    Interval History: no overnight issues. Wound vac with some small amount of bloody drainage. Wound Cx pending, antibiotic plan unclear. Need Wound Vac size, pressure, and length of treatment with wound vac order paperwork signed in chart    ROS: 11 point ROS -lots of pain    Objective:     Visit Vitals    /65 (BP 1 Location: Right arm, BP Patient Position: At rest)    Pulse 62    Temp 97.6 °F (36.4 °C)    Resp 16    Ht 5' 9.5\" (1.765 m)    Wt 76.2 kg (168 lb)    SpO2 97%    BMI 24.45 kg/m2       Temp (24hrs), Av.8 °F (36.6 °C), Min:97.2 °F (36.2 °C), Max:98.3 °F (36.8 °C)        Intake/Output Summary (Last 24 hours) at 17 0819  Last data filed at 17 0622   Gross per 24 hour   Intake          1431.25 ml   Output             2300 ml   Net          -868.75 ml       Gen: AOx3, NAD  HEENT:  TERI, EOMI. Neck: No Bruits/JVD   Lungs:   CTAB. Good respiratory effort  Heart:   RR S1 S2 without M/R/G  Abdomen: ND,NT, BSX4,   Extremities:   No LE edema. R foot wrapped. Minimal pain. Wound vac in place. Skin:  no jaundice. See above      Data Review:     Meds/Labs/Tests reviewed    Current Shift:     Last three shifts:   1901 -  0700  In: 1731.3 [P.O.:780; I.V.:951.3]  Out: 2900 [Urine:2800; Drains:100]  No results for input(s): WBC, RBC, HGB, HCT, PLT, GRANS, LYMPH, EOS, HGBEXT, HCTEXT, PLTEXT, HGBEXT, HCTEXT, PLTEXT in the last 72 hours.     Recent Labs      17   0447  17   0353  17   0400   BUN  33*  35*  35*   CREA  1.54*  1.56*  1.66*   CA  7.4*  7.8*  7.4*   K  4.3  3.8  4.1   NA  143  141  142   CL  109*  106  107   CO2  27  26  28   GLU  160*  138*  153*        Lab Results   Component Value Date/Time    Glucose 160 2017 04:47 AM    Glucose 138 2017 03:53 AM    Glucose 153 2017 04:00 AM    Glucose 132 2017 03:49 AM    Glucose 140 2017 07:10 AM          Care coordination with Nursing/Consultants/staff: 15  Prior history, labs, and charting reviewed: 15    Procedures/Imaging:  Angiography LE 11/21 -   OK ANGIO AORTOGRAM ABD SERIAL [91388 (CPT®)]      IR ANGIO EXT LOWER RT [[id number]]     ATHERECTOMY PERCUT,FEM-POP [29132 (CPT®)]     IR ANGIOPLASTY POPLITEAL FEM [[id number]]     11/22 I&D and debridement of necrotic tissue    Total time spent with chart review, patient examination/education, discussion with staff on case,documentation and medication management / adjustment  :  30 Minutes      Dr Vera Ovalle  [phone]

## 2017-11-23 NOTE — PROGRESS NOTES
Problem: Falls - Risk of  Goal: *Absence of Falls  Document Alban Fall Risk and appropriate interventions in the flowsheet.    Outcome: Not Progressing Towards Goal  Fall Risk Interventions:  Mobility Interventions: Patient to call before getting OOB, PT Consult for mobility concerns         Medication Interventions: Patient to call before getting OOB    Elimination Interventions: Call light in reach, Patient to call for help with toileting needs, Urinal in reach

## 2017-11-23 NOTE — PROGRESS NOTES
[address] Prasanth Mcgrathidi., [address], [address] [facility]  Tel: DAPHNE Caraballo 130 Urvashi Wiggins MD    PROGRESS NOTE    Patient: Tai Chen MRN: [id number]  SSN: [id number]    YOB: 1950  Age: 78 y.o. Sex: male      Date: 11/23/2017    Hospital: [facility]  Post-Op Day: 0    Plan:   Remove left groin dressing today. [address] Angelica Garcia for discharge from vascular standpoint. Will arrange for outpatient follow-up. Assessment:   PPD#2 s/p right SFA atherectomy. Doing well. No complaints today. Anxious to go home. Subjective:   No complaints  Pertinent items are noted in HPI. Objective:   Admit weight: Weight: 70.3 kg (155 lb)  Last recorded weight: Weight: 76.2 kg (168 lb)    Visit Vitals    /70    Pulse 69    Temp 97.9 °F (36.6 °C)    Resp 16    Ht 5' 9.5\" (1.765 m)    Wt 76.2 kg (168 lb)    SpO2 96%    BMI 24.45 kg/m2       Intake/Output Summary (Last 24 hours) at 11/23/17 1059  Last data filed at 11/23/17 4263   Gross per 24 hour   Intake          1431.25 ml   Output             2300 ml   Net          -868.75 ml       Physical exam was negative except for: Left groin soft with clean, dry dressing in place. Labs:   No results for input(s): WBC, HGB, HCT, PLT, RDW, HGBEXT, HCTEXT, PLTEXT in the last 72 hours. Recent Labs      11/23/17   0447  11/22/17   0353  11/21/17   0400   NA  143  141  142   K  4.3  3.8  4.1   CL  109*  106  107   CO2  27  26  28   GLU  160*  138*  153*   BUN  33*  35*  35*   CREA  1.54*  1.56*  1.66*   CA  7.4*  7.8*  7.4*   INR   --    --   1.0     No results for input(s): PH, PCO2, PO2, HCO3, FIO2 in the last 72 hours.     Omar Jones MD, FACS

## 2017-11-23 NOTE — PROGRESS NOTES
Podiatry Surgery Progress Note    Patient: Nelta Severance MRN: [id number] LOS: 6     YOB: 1950  Age: 78 y.o. Sex: male        Admit Date: 11/17/2017    POD 1 Day Post-Op  Procedure:   Procedure(s):  RIGHT FOOT DEBRIDEMENT AND INCISION AND DRAINAGE WITH APPLICATION OF WOUND VAC.       Subjective:     Shayy moncada 68 y/o seen at bedside this morning  state over all feeling much better.  Patient is currently watching TV, resting quiet and no apparent distress.      Review of Systems:  Other than the above HPI  General: denies chronic fatigue, weight loss,   HEENT: denies ringing in ears, dizzy spells, sinus trouble, hoarseness  GI: denies bloating, heartburn, regurgitation, difficulty and or painful swallowing, nausea  Lungs: denies cough, SOB, hemoptysis  Heart: denies any current chest pain, irregular heart beat  Skin: denies rashes, hives, allergic reaction  Urinary: denies UTI, kidney stones   Bones and Joints: denies back pain, gout, osteoporosis  Neurologic: denies headaches, numbness    Objective:     Vitals  Patient Vitals for the past 8 hrs:   BP Temp Pulse Resp SpO2   11/23/17 1012 - 97.9 °F (36.6 °C) 69 16 96 %   11/23/17 0922 171/70 - 62 - -       I/O Current Shift       I/O Last Three Shifts  11/21 1901 - 11/23 0700  In: 1731.3 [P.O.:780; I.V.:951.3]  Out: 2900 [Urine:2800; Drains:100]      Data Review  Recent Results (from the past 24 hour(s))   GLUCOSE, POC    Collection Time: 11/22/17  2:58 PM   Result Value Ref Range    Glucose (POC) 141 (H) 70 - 110 mg/dL   GLUCOSE, POC    Collection Time: 11/22/17  4:42 PM   Result Value Ref Range    Glucose (POC) 121 (H) 70 - 110 mg/dL   CULTURE, WOUND W GRAM STAIN    Collection Time: 11/22/17  4:45 PM   Result Value Ref Range    Special Requests: NO SPECIAL REQUESTS      GRAM STAIN RARE WBC'S      GRAM STAIN FEW GRAM POSITIVE COCCI IN PAIRS      Culture result: CULTURE IN PROGRESS,FURTHER UPDATES TO FOLLOW     CULTURE, ANAEROBIC    Collection Time: 11/22/17  4:45 PM   Result Value Ref Range    Special Requests: NO SPECIAL REQUESTS      Culture result: CULTURE IN PROGRESS,FURTHER UPDATES TO FOLLOW     METABOLIC PANEL, BASIC    Collection Time: 11/23/17  4:47 AM   Result Value Ref Range    Sodium 143 136 - 145 mmol/L    Potassium 4.3 3.5 - 5.5 mmol/L    Chloride 109 (H) 100 - 108 mmol/L    CO2 27 21 - 32 mmol/L    Anion gap 7 3.0 - 18 mmol/L    Glucose 160 (H) 74 - 99 mg/dL    BUN 33 (H) 7.0 - 18 MG/DL    Creatinine 1.54 (H) 0.6 - 1.3 MG/DL    BUN/Creatinine ratio 21 (H) 12 - 20      GFR est AA 55 (L) >60 ml/min/1.73m2    GFR est non-AA 45 (L) >60 ml/min/1.73m2    Calcium 7.4 (L) 8.5 - 10.1 MG/DL   GLUCOSE, POC    Collection Time: 11/23/17  8:30 AM   Result Value Ref Range    Glucose (POC) 162 (H) 70 - 110 mg/dL   GLUCOSE, POC    Collection Time: 11/23/17 11:56 AM   Result Value Ref Range    Glucose (POC) 206 (H) 70 - 110 mg/dL       Physical Exam:    Christina Zimmerman a 78 y. o. male. Pleasant, alert and oriented x3, in no apparent distress. . Patient is well-developed and nourished, with good attention to hygiene and body habitus. Mood and affect normal, appropriate to situation. Lower Extremity Exam:   Pre-op: ulcer full thickness dorsal with erythema and exposed EHL tendon dorsal right foot measuring 7.0cm x 2.0 cm with the exposed tendon measuring 1.9 cm. The posterior plantar aspect of the right heel measuring 5.5cm x 6.0 cm. There is exudate and mild odor. Post-op: Wound VAC intact and pulling at 125 mmHg. The periwound area NO erythema,  very mild edema and NO increase temp.      VASCULAR EXAM:. Pedal pulses are palpable right. Skin temperature is warm to warmer right and left foot. Digital capillary fill time is 3 seconds right foot.  There is edema of the right and left foot and ankle.       NEUROLOGICAL EXAM:. Sensation Intact with palpation right foot.      MUSCULOSKELETAL EXAM:. Muscle tone is normal. Muscle strength of the flexor and extensor group inversion and eversion Bilateral. 4/5.       DERMATOLOGICAL EXAM:. Skin is of abnormal texture and turgor with atrophic skin changes noting hair growth, nail changes (thickening), Bilateral.        Wound Presentation:  Wound Foot Anterior;Right;Plantar (Active)   DRESSING STATUS Intact;Breakthrough drainage 11/22/2017  3:03 PM   DRESSING TYPE Foam 11/22/2017 12:46 AM   Pressure Injury Unstageable 11/20/2017 10:15 AM   Drainage Amount  None 11/22/2017 12:46 AM   Drainage Color Serosanguinous 11/20/2017 10:15 AM   Wound Odor None 11/22/2017 12:46 AM   Periwound Skin Condition Other (comment) 11/20/2017 10:15 AM   Cleansing and Cleansing Agents  Dermal wound cleanser 11/20/2017 10:15 AM   Dressing Type Applied Foam 11/20/2017 10:15 AM   Number of days:6       Wound Heel (Active)   DRESSING STATUS Clean, dry, and intact 11/22/2017 12:46 AM   DRESSING TYPE Foam 11/22/2017 12:46 AM   Drainage Amount  None 11/22/2017 12:46 AM   Drainage Color Serosanguinous 11/20/2017 10:15 AM   Wound Odor None 11/22/2017 12:46 AM   Periwound Skin Condition Other (comment) 11/20/2017 10:15 AM   Cleansing and Cleansing Agents  Dermal wound cleanser 11/20/2017 10:15 AM   Dressing Type Applied Foam 11/20/2017 10:15 AM   Procedure Tolerated Well 11/20/2017 10:15 AM   Number of days:3       Wound Foot Dorsal;Right (Active)   DRESSING STATUS Breakthrough drainage 11/22/2017  3:00 PM   DRESSING TYPE Gauze wrap (rell); Elastic bandage 11/22/2017  3:00 PM   Incision site well approximated? No 11/22/2017  1:00 PM   Number of days:1       Wound Heel Right (Active)   DRESSING STATUS Breakthrough drainage 11/22/2017  3:00 PM   DRESSING TYPE Elastic bandage 11/22/2017  3:00 PM   Incision site well approximated?  No 11/22/2017  1:00 PM   Number of days:1              Assessment:     Active Problems:    Pain of foot due to ischemia when walking ([address] (11/17/2017)      Gangrene ([address] (11/19/2017)      Plan/Recommendations/Medical Decision Making:     Continue present treatment    Discussed with the patient all the above today's clinical findings. We reviewed again all the intr-op surgical findings. All questions answered and he indicated that he understood. Continue the present treatment of IVabx and Wound VAC    Change wound VAC tomorrow.      Dr [facility]  C) [phone]  11/23/2017  2:17 PM

## 2017-11-23 NOTE — PROGRESS NOTES
Problem: Self Care Deficits Care Plan (Adult)  Goal: *Acute Goals and Plan of Care (Insert Text)  Occupational Therapy Goals  Initiated 11/23/2017 within 7 day(s). 1.  Patient will perform grooming tasks at EOB with minimal assistance and good unsupported balance and supervision. 2.  Patient will perform lower body dressing with minimal assistance & AE, prn.  3.  Patient will perform functional task at EOB with supervision for balance for 8 minutes with fair+ dynamic sitting balance to increase activity tolerance for ADls. 4.  Patient will perform toilet transfers with moderate assistance . 5. Patient will perform all aspects of toileting with moderate assistance . 6. Patient will participate in upper extremity therapeutic exercise/activities with supervision/set-up for 8 minutes to increase BUE strength for ADLs. 7.  Patient will utilize energy conservation techniques during functional activities with minimal verbal cues. Outcome: Progressing Towards Goal  Occupational Therapy EVALUATION    Patient: Awais Cortes (26 y.o. male)  Date: 11/23/2017  Primary Diagnosis: Pain of foot due to ischemia when walking (HCC)  foot  Procedure(s) (LRB):  RIGHT FOOT DEBRIDEMENT AND INCISION AND DRAINAGE WITH APPLICATION OF WOUND VAC. (Right) 1 Day Post-Op   Precautions:  Fall, Skin (NWB RLE (?))    ASSESSMENT :  Based on the objective data described below, the patient presents with impairments with regard to bed mobility, activity tolerance, LUE function/strength (hx of CVA) and  independence in ADLs secondary to R foot wound; wound vac in tact. Reports 10/10 pain but agreeable to therapy. No c/o dizziness t/o session. Pt reports being wheelchair bound for past year. Agreeable to therapy; unsure of weightbearing status of RLE (NWB for caution). Min A/additional time to maneuver to EOB; fair balance. Decreased strength AROM of LUE due to previous CVA. Pt tolerated sitting at EOB for approx 5 mins.  Max A x2 to scoot towards [address] [facility] [address] (appreciate assistance from [facility]). Recommend continued therapy during acute care stay. RLE elevated at end of session. Patient will benefit from skilled intervention to address the above impairments. Patients rehabilitation potential is considered to be Good  Factors which may influence rehabilitation potential include:   []             None noted  []             Mental ability/status  [x]             Medical condition  []             Home/family situation and support systems  []             Safety awareness  []             Pain tolerance/management  []             Other:        PLAN :  Recommendations and Planned Interventions:  [x]               Self Care Training                  [x]        Therapeutic Activities  [x]               Functional Mobility Training    []        Cognitive Retraining  [x]               Therapeutic Exercises           [x]        Endurance Activities  [x]               Balance Training                   []        Neuromuscular Re-Education  []               Visual/Perceptual Training     [x]   Home Safety Training  [x]               Patient Education                 [x]        Family Training/Education  []               Other (comment):    Frequency/Duration: Patient will be followed by occupational therapy 3-5 times a week to address goals. Discharge Recommendations: Skilled Nursing Facility  Further Equipment Recommendations for Discharge: bedside commode     SUBJECTIVE:   Patient stated I don't want to go back there.     OBJECTIVE DATA SUMMARY:     Past Medical History:   Diagnosis Date    CAD (coronary artery disease)     S/P RCA stents in past    [facility]) [facility]     Diabetes ([facility] [facility]     Hypertension     Incomplete bladder emptying     Pneumonia     Stroke (cerebrum) ([facility] [address]      Past Surgical History:   Procedure Laterality Date    HX APPENDECTOMY  1976    HX ROTATOR CUFF REPAIR       Barriers to Learning/Limitations: None  Compensate with: visual, verbal, tactile, kinesthetic cues/model    GCODES:  Self Care  Current  CL= 60-79%   Goal  CJ= 20-39%. The severity rating is based on the Other Functional Assessment, MMT, ROM    Eval Complexity: History: MEDIUM Complexity : Expanded review of history including physical, cognitive and psychosocial  history ; Examination: MEDIUM Complexity : 3-5 performance deficits relating to physical, cognitive , or psychosocial skils that result in activity limitations and / or participation restrictions; Decision Making:MEDIUM Complexity : Patient may present with comorbidities that affect occupational performnce. Miniml to moderate modification of tasks or assistance (eg, physical or verbal ) with assesment(s) is necessary to enable patient to complete evaluation     Prior Level of Function/Home Situation: Pt was independent with basic self care tasks and functional mobility PTA. Home Situation  Home Environment: [address] [place] [address] Name: Sheree  One/Two Story Residence: One story  Living Alone: No  Support Systems: Skilled nursing facility  Patient Expects to be Discharged to[de-identified] Assisted living  Current DME Used/Available at Home: Wheelchair  []  Right hand dominant   []  Left hand dominant    Cognitive/Behavioral Status:  Neurologic State: Alert  Orientation Level: Oriented X4  Cognition: Follows commands; Impulsive; Impaired decision making  Safety/Judgement: Decreased awareness of need for safety     Skin: Intact (BUEs)  Edema: None Noted (BUEs)    Vision/Perceptual:    Acuity: Within Defined Limits      Coordination:  Coordination: Grossly decreased, non-functional (LUE)  Fine Motor Skills-Upper: Right Intact; Left Impaired    Gross Motor Skills-Upper: Right Intact; Left Impaired     Balance:  Sitting: Impaired  Sitting - Static: Fair (occasional)  Sitting - Dynamic: Fair (occasional)  Standing:  (not assessed)     Strength:  Strength: Grossly decreased, non-functional (RUE: 5/5; LUE: approx 2/5; 3/5)    Tone & Sensation:  Tone: Normal (BUEs)  Sensation:  (not assessed)    irena of Motion:  AROM: Grossly decreased, non-functional (RUE WFL; LUE slight shoulder flex; 3/5 tricep; weak hand gri)  PROM: Within functional limits (BUEs)    Functional Mobility and Transfers for ADLs:  Bed Mobility:  Supine to Sit: Minimum assistance; Additional time  Sit to Supine: Minimum assistance; Additional time    Transfers:  Sit to Stand:  (not assessed)     ADL Assessment:  Feeding: Setup;Modified independent  Oral Facial Hygiene/Grooming: Setup;Supervision  Bathing: Maximum assistance  Upper Body Dressing: Maximum assistance  Lower Body Dressing: Maximum assistance  Toileting: Total assistance (condom catheter)    Cognitive Retraining  Safety/Judgement: Decreased awareness of need for safety    Pain:  Pre treatment pain level: 10/10  Post treatment pain level: 10/10  Pain Scale 1: Visual  Pain Intensity 1: 0    Activity Tolerance:  Fair  Please refer to the flowsheet for vital signs taken during this treatment. After treatment:   [] Patient left in no apparent distress sitting up in chair  [x] Patient left in no apparent distress in bed  [x] Call bell left within reach  [x] Nursing notified  [] Caregiver present  [] Bed alarm activated    COMMUNICATION/EDUCATION: Pt educated on role of oT and POC; he verbalized understanding. [] Home safety education was provided and the patient/caregiver indicated understanding. [x] Patient/family have participated as able in goal setting and plan of care. [x] Patient/family agree to work toward stated goals and plan of care. [] Patient understands intent and goals of therapy, but is neutral about his/her participation. [] Patient is unable to participate in goal setting and plan of care.     Thank you for this referral.  Time Calculation: 15 mins

## 2017-11-23 NOTE — PROGRESS NOTES
2000: Verbal shift report received from [facility]. Pt is awake in bed, no signs of distress, instructed to press call light if assistance is needed, call light within reach. Pt refused to be \"checked\" until 0700 in the morning. Pt states \"go away, ya'll talked too much\"    [id number]: Pt requested for pain med. Norco given, told pt that VS needs to be taken, pt allowed this RN to take his VS, the rest of the night meds were also taken by pt. Pt however refused to have his blood sugar checked, humalog scheduled was held. Pt states \"Now go away and dont come back until the morning\"    Bedside and Verbal shift change report given to Alie Fonseca [address] [place] [address] (oncoming nurse) by Sebastian Lanier RN (offgoing nurse). Report included the following information SBAR, Kardex, Intake/Output, MAR and Recent Results.

## 2017-11-23 NOTE — PROGRESS NOTES
Pharmacy Dosing Services: Vancomycin    Indication: HAP    Day of therapy: 6    Other Antimicrobials (Include dose, start day & day of therapy):  Levaquin 750mg PO every 48 hours (started: )    Current Maintenance dose: 1000 mg IV q24h    Goal Vancomycin Level: 15-20  (Trough 15-20 for most infections, 20 for meningitis/osteomyelitis, pre-HD level ~25)    Vancomycin Level (if drawn):    at 1430 - 15 (~10 hours post-dose)  - 17.6 mcg/dl    Significant Cultures:   Blood - NGTD x 6 days   foot gram stain: GPC     Renal function stable? (unstable defined as SCr increase of 0.5 mg/dL or > 50% increase from baseline, whichever is greater) (Y/N): Y (improving)    CAPD, Hemodialysis or Renal Replacement Therapy (Y/N): N     Recent Labs      17   0447  17   0353  17   0400   CREA  1.54*  1.56*  1.66*   BUN  33*  35*  35*     Temp (24hrs), Av ° F (36.7 ° C), Min:97.6 ° F (36.4 ° C), Max:98.3 ° F (36.8 ° C)    Creatinine Clearance (Creatinine Clearance (ml/min)): 48 ml/min     Regimen assessment: trough within goal, continue current dose  Maintenance dose: 1000 mg IV every 24 hours  Next scheduled level:  prior to 1900 dose       Pharmacy will follow daily and adjust medications as appropriate for renal function and/or serum levels.     Thank you,  Jodell Jeans

## 2017-11-23 NOTE — PROGRESS NOTES
Problem: Falls - Risk of  Goal: *Absence of Falls  Document Alban Fall Risk and appropriate interventions in the flowsheet.    Outcome: Progressing Towards Goal  Fall Risk Interventions:  Mobility Interventions: Communicate number of staff needed for ambulation/transfer, Patient to call before getting OOB         Medication Interventions: Patient to call before getting OOB    Elimination Interventions: Call light in reach

## 2017-11-23 NOTE — PROGRESS NOTES
Holding PT evaluation at this time. Underwent wound debridement and wound vac placement to right foot. Will need updated activity orders and weightbearing status to maximize participation in functional mobility.      Thank you,     Katherine Bonner, PT, DPT

## 2017-11-24 LAB
ANION GAP SERPL CALC-SCNC: 5 MMOL/L (ref 3–18)
BUN SERPL-MCNC: 31 MG/DL (ref 7–18)
BUN/CREAT SERPL: 21 (ref 12–20)
CALCIUM SERPL-MCNC: 7.7 MG/DL (ref 8.5–10.1)
CHLORIDE SERPL-SCNC: 110 MMOL/L (ref 100–108)
CO2 SERPL-SCNC: 26 MMOL/L (ref 21–32)
CREAT SERPL-MCNC: 1.51 MG/DL (ref 0.6–1.3)
GLUCOSE BLD STRIP.AUTO-MCNC: 185 MG/DL (ref 70–110)
GLUCOSE BLD STRIP.AUTO-MCNC: 223 MG/DL (ref 70–110)
GLUCOSE BLD STRIP.AUTO-MCNC: 265 MG/DL (ref 70–110)
GLUCOSE SERPL-MCNC: 144 MG/DL (ref 74–99)
POTASSIUM SERPL-SCNC: 4.3 MMOL/L (ref 3.5–5.5)
SODIUM SERPL-SCNC: 141 MMOL/L (ref 136–145)

## 2017-11-24 PROCEDURE — 74011250637 HC RX REV CODE- 250/637: Performed by: HOSPITALIST

## 2017-11-24 PROCEDURE — 80048 BASIC METABOLIC PNL TOTAL CA: CPT | Performed by: PODIATRIST

## 2017-11-24 PROCEDURE — 74011636637 HC RX REV CODE- 636/637: Performed by: HOSPITALIST

## 2017-11-24 PROCEDURE — 97162 PT EVAL MOD COMPLEX 30 MIN: CPT

## 2017-11-24 PROCEDURE — 74011250637 HC RX REV CODE- 250/637: Performed by: INTERNAL MEDICINE

## 2017-11-24 PROCEDURE — 74011250636 HC RX REV CODE- 250/636: Performed by: INTERNAL MEDICINE

## 2017-11-24 PROCEDURE — 77030019934 HC DRSG VAC ASST KCON -B

## 2017-11-24 PROCEDURE — 82962 GLUCOSE BLOOD TEST: CPT

## 2017-11-24 PROCEDURE — 65660000000 HC RM CCU STEPDOWN

## 2017-11-24 PROCEDURE — 97530 THERAPEUTIC ACTIVITIES: CPT

## 2017-11-24 PROCEDURE — 36415 COLL VENOUS BLD VENIPUNCTURE: CPT | Performed by: PODIATRIST

## 2017-11-24 RX ADMIN — INSULIN LISPRO 6 UNITS: 100 INJECTION, SOLUTION INTRAVENOUS; SUBCUTANEOUS at 12:13

## 2017-11-24 RX ADMIN — INSULIN LISPRO 4 UNITS: 100 INJECTION, SOLUTION INTRAVENOUS; SUBCUTANEOUS at 18:13

## 2017-11-24 RX ADMIN — HYDROCODONE BITARTRATE AND ACETAMINOPHEN 2 TABLET: 5; 325 TABLET ORAL at 08:18

## 2017-11-24 RX ADMIN — SODIUM CHLORIDE 1000 MG: 900 INJECTION, SOLUTION INTRAVENOUS at 19:50

## 2017-11-24 RX ADMIN — METOPROLOL TARTRATE 25 MG: 25 TABLET, FILM COATED ORAL at 11:31

## 2017-11-24 RX ADMIN — ASPIRIN 81 MG: 81 TABLET, COATED ORAL at 11:31

## 2017-11-24 RX ADMIN — Medication 10 ML: at 16:01

## 2017-11-24 RX ADMIN — INSULIN LISPRO 2 UNITS: 100 INJECTION, SOLUTION INTRAVENOUS; SUBCUTANEOUS at 09:21

## 2017-11-24 RX ADMIN — LOSARTAN POTASSIUM 100 MG: 50 TABLET ORAL at 11:31

## 2017-11-24 RX ADMIN — HYDROCODONE BITARTRATE AND ACETAMINOPHEN 2 TABLET: 5; 325 TABLET ORAL at 20:47

## 2017-11-24 RX ADMIN — HYDROCODONE BITARTRATE AND ACETAMINOPHEN 2 TABLET: 5; 325 TABLET ORAL at 16:17

## 2017-11-24 NOTE — PROGRESS NOTES
Went into patient,s room to obtain vital signs and accucheck. Patient told this RN to get out of the room and he does not want to be bothered from 7pm to 7am, Explained MD orders,patient refused vs and accucheck.

## 2017-11-24 NOTE — PROGRESS NOTES
OT attempted 2x, pt refusing c/o RLE pain 10/10 this am and now RLE bleeding through ace wrap and sock. Nodarenn Manifold, aware. Will continue to follow.     MEHRAN Klein/L

## 2017-11-24 NOTE — PROGRESS NOTES
Progress Note      Patient: Nicole Leung               Sex: male          DOA: 11/17/2017       YOB: 1950      Age:  78 y.o.        LOS:  LOS: 7 days             CHIEF COMPLAINT:    Subjective:     Pt feels well today and denies fever, chills, CP, or SOB. He wants to be discharged today and also wants to go downstairs to smoke but was advised this won't be possible and he became upset and uttered offensive language.  was at bedside. Objective:      Visit Vitals    /77    Pulse 63    Temp 98.3 °F (36.8 °C)    Resp 18    Ht 5' 9.5\" (1.765 m)    Wt 168 lb (76.2 kg)    SpO2 92%    BMI 24.45 kg/m2       Physical Exam:  GEN: AAO X 3, NAD  CVS: Normal S1S2, RRR  RESP: CTAB  ABD: Soft, NT/ND, +BS  EXT: No edema noted and right foot wrapped in dressing  NEURO: CN 2 - 12 intact with no focal deficits noted. Lab/Data Reviewed:  CMP:   Lab Results   Component Value Date/Time     11/24/2017 04:06 AM    K 4.3 11/24/2017 04:06 AM     (H) 11/24/2017 04:06 AM    CO2 26 11/24/2017 04:06 AM    AGAP 5 11/24/2017 04:06 AM     (H) 11/24/2017 04:06 AM    BUN 31 (H) 11/24/2017 04:06 AM    CREA 1.51 (H) 11/24/2017 04:06 AM    GFRAA 56 (L) 11/24/2017 04:06 AM    GFRNA 46 (L) 11/24/2017 04:06 AM    CA 7.7 (L) 11/24/2017 04:06 AM     CBC: No results found for: WBC, HGB, HGBEXT, HCT, HCTEXT, PLT, PLTEXT, HGBEXT, HCTEXT, PLTEXT        Assessment/Plan     Active Problems:    Pain of foot due to ischemia when walking ([address] (11/17/2017)      Gangrene ([address] (11/19/2017)        Plan:  R and L foot ulcers and gangrene R heal - Continue analgesics as needed. Podiatry and vascular following and appreciate help. Continue Vancomycin and PO Levaquin. S/P debridement 11/22 Dr Julia Gongora who recommends wound vac on discharge. Elevated troponin: in setting of physiologic stress, Fevers, TAD and some sort of infection. No CP. No ACS at this time.   SIRS - Fever and leukocytosis tresolved  Chronic systolic/diastolic cardiomyopathy EF 45 % to 50 %. There was mild diffuse hypokinesis. grade 1 diastolic dysfunction 63/31  CAD s/p multiple stents to RCA , Most recent Cath 4/7/12 (Dr. Theodore Hernandez) 3 vessel CAD with nonciritcal stenosis in the LAD and Cx with total occlusion of the very proximal RCA site of prior stenting, repeat stenting of RCA  HTN. Continue current meds and close blood pressure monitoring. HLD. Continue Lipitor 80 mg daily  DM - Glucose moderately controlled. COPD  PAD  Tobacco abuse - He stated that he smokes 1 ppd ongoing for several years - Counseled extensively and advised starting nicotine patch, but he refused. DVT  H/O Non-compliance per previous chart and records  Disposition: He was supposed to be discharged back to [facility] today, but per my discussion with the , discharge will be postponed until Monday, as [facility] cannot get the wound vac until Monday.       Andi Ceron DO, MPH  Internal Medicine

## 2017-11-24 NOTE — PROGRESS NOTES
1915: Bedside verbal shift report received from [facility], [facility]. Pt is awake in bed, no signs of distress, instructed to press call light if assistance is needed, call light within reach. Upon report, pt states that he does not want to be bothered until 7am in the morning. Re-educated pt, pt states he \"does not care\" as he can do whatever he wants. States \"I don't like to be bothered when I'm sleeping\"    2200: Attempted to take pt's blood sugar and vital signs taken, pt refused. 2300: Pt let this RN redress his IV dressing. Offered pt his medicine, including pain med, pt refused, pt rushing this RN to leave his room. Asked pt the reason why he is refusing care, pt states \"I don't know where are you getting these information. I'm not refusing care, I just want to be left alone from 7pm to 7am. End of discussion\" Explained to pt that we have to round on him every hour to make sure he is okay and that we do certain procedures (VS, blood sugar checks, etc) at certain times. Pt states that it's inconvenient for his schedule. 0030: Pt called to be changed, this RN and another RN went in pt's room 30 minutes after, pt states \"You took too long! Just forget it! \" explained to pt that he should be changed if he is soiled, pt states \"I'm not wet, just go, and shut my door! \"    0805: Bedside and Verbal shift change report given to Perico Brewster RN (oncoming nurse) by Julieth Aponte RN (offgoing nurse). Report included the following information SBAR, Kardex, Intake/Output, MAR and Recent Results.

## 2017-11-24 NOTE — PROGRESS NOTES
Podiatry Surgery Progress Note    Patient: Korey Gtz MRN: [id number] LOS: 7     YOB: 1950  Age: 78 y.o. Sex: male        Admit Date: 11/17/2017    POD 2 Days Post-Op  Procedure:   Procedure(s):  RIGHT FOOT DEBRIDEMENT AND INCISION AND DRAINAGE WITH APPLICATION OF WOUND VAC. Subjective:      Vijaya Fossa a 66 y/o seen at bedside this morning  state over all feeling much better.  Patient is currently watching TV, eating breakfast, resting quiet and no apparent distress. Patient asking - when I get back to the SNF how do I go outside and smoke with this unit on my foot(VAC)?      Review of Systems:  Other than the above HPI  General: denies chronic fatigue, weight loss,   HEENT: denies ringing in ears, dizzy spells, sinus trouble, hoarseness  GI: denies bloating, heartburn, regurgitation, difficulty and or painful swallowing, nausea  Lungs: denies cough, SOB, hemoptysis  Heart: denies any current chest pain, irregular heart beat  Skin: denies rashes, hives, allergic reaction  Urinary: denies UTI, kidney stones   Bones and Joints: denies back pain, gout, osteoporosis  Neurologic: denies headaches, numbness    Objective:     Vitals  No data found. I/O Current Shift       I/O Last Three Shifts  11/22 1901 - 11/24 0700  In: 1321.3 [P.O.:520;  I.V.:801.3]  Out: 2910 [Urine:2900; Drains:10]      Data Review  Recent Results (from the past 24 hour(s))   GLUCOSE, POC    Collection Time: 11/23/17 11:56 AM   Result Value Ref Range    Glucose (POC) 206 (H) 70 - 110 mg/dL   VANCOMYCIN, TROUGH    Collection Time: 11/23/17  3:15 PM   Result Value Ref Range    Vancomycin,trough 17.6 10.0 - 20.0 ug/mL    Reported dose date: [id number]      Reported dose time: 1600      Reported dose: 1000 MG UNITS   GLUCOSE, POC    Collection Time: 11/23/17  5:17 PM   Result Value Ref Range    Glucose (POC) 186 (H) 70 - 156 mg/dL   METABOLIC PANEL, BASIC    Collection Time: 11/24/17  4:06 AM   Result Value Ref Range Sodium 141 136 - 145 mmol/L    Potassium 4.3 3.5 - 5.5 mmol/L    Chloride 110 (H) 100 - 108 mmol/L    CO2 26 21 - 32 mmol/L    Anion gap 5 3.0 - 18 mmol/L    Glucose 144 (H) 74 - 99 mg/dL    BUN 31 (H) 7.0 - 18 MG/DL    Creatinine 1.51 (H) 0.6 - 1.3 MG/DL    BUN/Creatinine ratio 21 (H) 12 - 20      GFR est AA 56 (L) >60 ml/min/1.73m2    GFR est non-AA 46 (L) >60 ml/min/1.73m2    Calcium 7.7 (L) 8.5 - 10.1 MG/DL   GLUCOSE, POC    Collection Time: 11/24/17  8:22 AM   Result Value Ref Range    Glucose (POC) 185 (H) 70 - 110 mg/dL       Physical Exam:    Lauren Benavides  is a 78 y.o. male. Pleasant, alert and oriented x3, in no apparent distress. Patient is well-developed and nourished, with good attention to hygiene and body habitus. Mood and affect normal, appropriate to situation. Lower Extremity Exam:   Pre-op: ulcer full thickness dorsal with erythema and exposed EHL tendon dorsal right foot measuring 7.0cm x 2.0 cm with the exposed tendon measuring 1.9 cm. The posterior plantar aspect of the right heel measuring 5.5cm x 6.0 cm. There is exudate and mild odor.     Post-op: Wound VAC intact and pulling at 125 mmHg. The periwound area NO erythema,  very mild edema and NO increase temp. Wound bed red and with some granular tissue. Some mild yellow slough.       VASCULAR EXAM:. Pedal pulses are palpable right. Skin temperature is warm to warmer right and left foot. Digital capillary fill time is 3 seconds right foot.  There is edema of the right and left foot and ankle.       NEUROLOGICAL EXAM:. Sensation Intact with palpation right foot.      MUSCULOSKELETAL EXAM:. Muscle tone is normal. Muscle strength of the flexor and extensor group inversion and eversion Bilateral. 4/5.       DERMATOLOGICAL EXAM:. Skin is of abnormal texture and turgor with atrophic skin changes noting hair growth, nail changes (thickening), Bilateral.        Wound Presentation:  Wound Foot Anterior;Right;Plantar (Active)   DRESSING STATUS Intact;Breakthrough drainage 11/22/2017  3:03 PM   DRESSING TYPE Foam 11/22/2017 12:46 AM   Pressure Injury Unstageable 11/20/2017 10:15 AM   Drainage Amount  None 11/22/2017 12:46 AM   Drainage Color Serosanguinous 11/20/2017 10:15 AM   Wound Odor None 11/22/2017 12:46 AM   Periwound Skin Condition Other (comment) 11/20/2017 10:15 AM   Cleansing and Cleansing Agents  Dermal wound cleanser 11/20/2017 10:15 AM   Dressing Type Applied Foam 11/20/2017 10:15 AM   Number of days:7       Wound Heel (Active)   DRESSING STATUS Clean, dry, and intact 11/22/2017 12:46 AM   DRESSING TYPE Foam 11/22/2017 12:46 AM   Drainage Amount  None 11/22/2017 12:46 AM   Drainage Color Serosanguinous 11/20/2017 10:15 AM   Wound Odor None 11/22/2017 12:46 AM   Periwound Skin Condition Other (comment) 11/20/2017 10:15 AM   Cleansing and Cleansing Agents  Dermal wound cleanser 11/20/2017 10:15 AM   Dressing Type Applied Foam 11/20/2017 10:15 AM   Procedure Tolerated Well 11/20/2017 10:15 AM   Number of days:4       Wound Foot Dorsal;Right (Active)   DRESSING STATUS Breakthrough drainage 11/22/2017  3:00 PM   DRESSING TYPE Gauze wrap (rell); Elastic bandage 11/22/2017  3:00 PM   Incision site well approximated? No 11/22/2017  1:00 PM   Number of days:2       Wound Heel Right (Active)   DRESSING STATUS Breakthrough drainage 11/22/2017  3:00 PM   DRESSING TYPE Elastic bandage 11/22/2017  3:00 PM   Incision site well approximated? No 11/22/2017  1:00 PM   Number of days:2              Assessment:     Active Problems:    Pain of foot due to ischemia when walking ([address] (11/17/2017)      Gangrene ([address] (11/19/2017)          Plan/Recommendations/Medical Decision Making:      Discussed with the patient all the above today's clinical findings. We reviewed again all the surgical findings and cultures pending. Spoke with him in great detail needs to stop smoking.   All questions answered and he indicated that he understood.      Continue the present treatment of IVabx and Wound VAC     From a Podiatric Surgery point of view OK to discharge back to SNF with wound VAC and follow up in the wound care center in about a week after discharge.          Dr Frieda Singh and Ankle  C) [phone]  11/24/2017  9:07 AM

## 2017-11-24 NOTE — PROGRESS NOTES
Problem: Mobility Impaired (Adult and Pediatric)  Goal: *Acute Goals and Plan of Care (Insert Text)  Physical Therapy Goals  Initiated 11/24/2017 and to be accomplished within 7 day(s)  1. Patient will move from supine to sit and sit to supine , scoot up and down and roll side to side in bed with independence. 2.  Patient will transfer from bed to chair and chair to bed with minimal assistance using the least restrictive device. 3.  Patient will perform sit to stand with minimal assistance. 4.  Patient will demonstrate independent wheelchair mobility for 150 ft.      physical Therapy EVALUATION    Patient: Jessie Clemons (14 y.o. male)  Date: 11/24/2017  Primary Diagnosis: Pain of foot due to ischemia when walking (HCC)  foot  Procedure(s) (LRB):  RIGHT FOOT DEBRIDEMENT AND INCISION AND DRAINAGE WITH APPLICATION OF WOUND VAC. (Right) 2 Days Post-Op   Precautions:  Fall    ASSESSMENT :  Upon evaluation, pt presents awake in bed. Educated pt on role and benefit of skilled PT interventions. Pt had dressing on RLE. LLE externally rotated and flexed lying in supine. AROM WNL on RLE. Impaired on LLE. Pt able to actively DF/PF. PROM on LLE WNL. Some pain noted. ModA for supine>sit for UE management. Pt able to independently slide LLE across bed for sitting. Strength assessed in LLE at 2/5, RLE at 4/5. TE of LAQ x10 on RLE, x3 on LLE. Pt had increased difficulty extending LLE, stopped after 3 reps and stated \"that's enough on that side\". Did not attempt sit>stand d/t unknown WB status on RLE. Pt returned to bed and left with call bell in reach. Nursing notified. Pt would benefit from skilled PT interventions to address decrease in bed mobility, strength, ROM, transfers, and balance to safely return to PLOF. Patient presents with deficits in:   Bed Mobility, Transfers, Range of Motion, Gait, Strength and Balance    Patient will benefit from skilled intervention to address the above impairments.   Patients rehabilitation potential is considered to be Good  Factors which may influence rehabilitation potential include:   []         None noted  []         Mental ability/status  [x]         Medical condition  []         Home/family situation and support systems  [x]         Safety awareness  [x]         Pain tolerance/management  [x]         Other: PT compliance       PLAN :  Recommendations and Planned Interventions:  [x]           Bed Mobility Training             [x]    Neuromuscular Re-Education  [x]           Transfer Training                   []    Orthotic/Prosthetic Training  [x]           Gait Training                          []    Modalities  [x]           Therapeutic Exercises          []    Edema Management/Control  [x]           Therapeutic Activities            [x]    Patient and Family   Training/Education  []           Other (comment):    Frequency/Duration: Patient will be followed by physical therapy 3-5 times a week to address goals. Discharge Recommendations: Zack Lee  Further Equipment Recommendations for Discharge: wheelchair      SUBJECTIVE:   Patient stated I need a diabetic shoe.     OBJECTIVE DATA SUMMARY:     Past Medical History:   Diagnosis Date    CAD (coronary artery disease)     S/P RCA stents in past    [facility]     Diabetes [facility]     Hypertension     Incomplete bladder emptying     Pneumonia     Stroke (cerebrum) ([facility]      Past Surgical History:   Procedure Laterality Date    HX APPENDECTOMY  1976    HX ROTATOR CUFF REPAIR       Barriers to Learning/Limitations: yes;  physical  Compensate with: visual, verbal, tactile, kinesthetic cues/model    G CODE:Mobility  Current  CL= 60-79%   Goal  CI= 1-19%.   The severity rating is based on the Other [address] Sitting Balance Scale: 2/5    Eval Complexity: History: HIGH Complexity :3+ comorbidities / personal factors will impact the outcome/ POC Exam:MEDIUM Complexity : 3 Standardized tests and measures addressing body structure, function, activity limitation and / or participation in recreation  Presentation: MEDIUM Complexity : Evolving with changing characteristics  Clinical Decision Making:Medium Complexity [facility] Sitting Balance Scale: 2/5 Overall Complexity:MEDIUM    [facility] Sitting Balance Scale: 2/5  0: Pt performs 25% or less of sitting activity (Max assist) CN, 100% impaired. 1: Pt supports self with upper extremities but requires therapist assistance. Pt performs 25-50% of effort (Mod assist) CM, 80% to <100% impaired. 1+: Pt supports self with upper extremities but requires therapist assistance. Pt performs >50% effort. (Min assist). CL, 60% to <80% impaired. 2: Pt supports self independently with both upper extremities. CL, 60% to <80% impaired. 2+: Pt support self independently with 1 upper extremity. CK, 40% to <60% impaired. 3: Pt sits without upper extremity support for up to 30 seconds. CK, 40% to <60% impaired. 3+: Pt sits without upper extremity support for 30 seconds or greater. CJ, 20% to <40% impaired. 4: Pt moves and returns trunkal midpoint 1-2 inches in one plane. CJ, 20% to <40% impaired. 4+: Pt moves and returns trunkal midpoint 1-2 inches in multiple planes. CI, 1% to <20% impaired. 5: Pt moves and returns trunkal midpoint in all planes greater than 2 inches. CH, 0% impaired.     Prior Level of Function/Home Situation:   Home Situation  Home Environment: [address] [place] [address] Name: [facility]  One/Two Story Residence: One story  Living Alone: No  Support Systems: Skilled nursing facility  Patient Expects to be Discharged to[de-identified] Skilled nursing facility  Current DME Used/Available at Home: Wheelchair  Critical Behavior:  Neurologic State: Alert  Cognition: Follows commands  Psychosocial  Patient Behaviors: Demanding  Strength:    Strength: Generally decreased, functional (LLE 2/5 RLE 4/5)  Tone & Sensation:   Sensation: Impaired  Range Of Motion:  AROM: Within functional limits  PROM: Within functional limits  Functional Mobility:  Bed Mobility:  Supine to Sit: Moderate assistance  Sit to Supine: Stand-by asssistance  Scooting: Stand-by asssistance  Transfers: not assessed, unknown WB status on RLE  Balance:   Sitting: Impaired; Without support  Sitting - Static: Good (unsupported)  Sitting - Dynamic: Fair (occasional)  Therapuetic Exercises  LAQ: RLE x10 LLE x3  Treatment pain level: 0/10, nursing notified  Activity Tolerance:   Fair  Please refer to the flowsheet for vital signs taken during this treatment. After treatment:   []         Patient left in no apparent distress sitting up in chair  [x]         Patient left in no apparent distress in bed  [x]         Call bell left within reach  [x]         Nursing notified  []         Caregiver present  []         Bed alarm activated    COMMUNICATION/EDUCATION:   [x]         Fall prevention education was provided and the patient/caregiver indicated understanding. [x]         Patient/family have participated as able in goal setting and plan of care. [x]         Patient/family agree to work toward stated goals and plan of care. []         Patient understands intent and goals of therapy, but is neutral about his/her participation. []         Patient is unable to participate in goal setting and plan of care.     Thank you for this referral.  Kyle Bella, SPT   Time Calculation: 18 mins

## 2017-11-24 NOTE — PROGRESS NOTES
Spoke to the patient at length. He stated he wanted to discharge today. I explained that I had spoken to Florina with [facility] and she stated she was unable to obtain a wound vac until Monday. He stated he will leave without the wound vac. I explained that if he leaves without a wound vac the facility will not accept him back as they will not have orders for his wound care. I verified this with Florina. He says he just wants to go outside for a cigarette and if he didn't get a cigarette he was leaving AMA. Attempted to defuse the situation with rational explanation,  He asked to speak to his nurse whim he said will roll him downstairs for a smoke.  I notified his nurse of his request. I spoke to the patient doctor and explained the patients request. Shanelle Molina RN

## 2017-11-24 NOTE — PROGRESS NOTES
Received report from [address] [facility] [address], RN. Patient alert and oriented x 4, resting in bed. Bed locked and in lowest position with call bell in reach. 0930- Patient refuses morning medications. Educated patient of importance of taking medications. 1650- Several nurses attempted change of wound vac and dressing, with each attempt reading a leak. Wet to dry dressing applied, with kerlix and ace bandage applied. Dressing clean, dry, and intact.

## 2017-11-24 NOTE — OP NOTES
Zack Butler    Name:  Renea Daily  MR#:  [id number]  :  1950  Account #:  [de-identified]  Date of Adm:  2017  Date of Surgery:  2017      PREOPERATIVE DIAGNOSIS: Right foot gangrenous ulcer on the  dorsum of his foot, as well as plantar posterior heel. POSTOPERATIVE DIAGNOSIS: Right foot gangrenous ulcer on the  dorsum of his foot, as well as plantar posterior heel. SURGICAL PROCEDURES PERFORMED: Right foot incision and  drainage of necrotic tissue, along with sharp dissection of gangrenous  tissue and application of a wound VAC. SURGEON: Ashleigh Briseno DPM    ANESTHESIA: IV sedation with a right ankle block, per Anesthesia. COMPLICATIONS: None. HEMOSTASIS: minimal    ESTIMATED BLOOD LOSS: Minimal.    SPECIMENS REMOVED: tissue C&S    INDICATIONS FOR SURGERY: The patient is a 69-year-old male,  who presents with a gangrenous right foot and is status post  revascularization, and now presents for surgical debridement of any  necrotic tissue. DESCRIPTION OF PROCEDURE: The patient was brought to the  operating room upon his bed. His right foot was then prepped,  scrubbed and draped in a normal sterile fashion. After it was determined he was adequately anesthetized, attention was  addressed to the dorsomedial aspect of his right foot over the area of  his first metatarsal and medial cuneiform area, beginning where there  was noted necrotic tissue with exposed extensor hallucis longus  tendon. This necrotic ulceration, which was full-thickness, was  surgically excised. The pre-debridement measurements were  approximately 7 x 2 cm. When his presurgical debridement  measurements were 7.0 x approximately 2.0 cm, his after-debridement  measurements were 8 x 2.5 cm in width.  Again, this incision was  begun proximally at the level of the medial cuneiform navicular area;  it was brought out medially and distally along the medial shaft of the  first metatarsal, cutting and dissecting and removing all blackened   necrotic tissue, out towards the base of the proximal phalanx and the  metatarsophalangeal joint, brought across proximal to the MPJ, and  then brought back laterally and then posteriorly lateral to the extensor  hallucis longus tendon towards the base of the first metatarsal, Lisfranc  joint area, brought back proximally and again back towards the medial  cuneiform navicular joint. This necrotic tissue was then removed in  toto. Specimen of tissue was sent off for cultures and sensitivities, both  anaerobic and aerobic. The deep tissues were also noted to be a  mixture of necrotic and granular tissue; therefore, further dissection  and cutting away necrotic tissue, along with viable and nonviable  tissues was then performed down to red bleeding granular tissue. There was noted a pustular pocket heading towards the first  interspace, and this was dissected out, again removing necrotic tissues  along the lateral part of the first metatarsophalangeal joint and the first  metatarsal head. Attention was then addressed to the plantar aspect of the foot, where  the ulceration and necrotic tissue at this level measured approximately  5.5 x 6 cm, and after debridement, it was approximately 6 cm x 6.5 cm. This was sharply dissected out, removing all necrotic tissue down into  the adipose layer. Attention was then addressed back dorsally, once again, reevaluating  part of the extensor hallucis longus tendon. The paratenon had  some necrotic tissue in it; this cut away sharply with a number 15 blade   was also removed. The area was then flushed with copious amounts of normal saline  using Pulsavac system with bacitracin, and a total of 3 liters of saline  was utilized. After having been flushed with copious amounts of normal  saline, there were no pulsating bleeders. Some local bleeders were  then bovied off.  Again, tourniquet was never used.    A wound VAC was then applied. The sponge was then custom  fitted for the   dorsal woundalong fitting the sponge in the plantar heel ulcer. The sponges   placed into boththe wounds. The wound VAC hose system was attached   and checked for any leakage at 125 mmHg. None was noted; therefore, this   Was then wrapped with dry sterile Kerlix  along with an Ace wrap. Patient tolerated all procedures well and was discharged to Recovery  in stable condition, again with all vascular status intact. Tourniquet was  never used.         FIDEL Glasgow / Russell Courts  D:  11/23/2017   15:33  T:  11/23/2017   21:06  Job #:  [id number]

## 2017-11-24 NOTE — PROGRESS NOTES
Nutrition follow up/Plan of care      RECOMMENDATIONS:     1. Low Na (2 g) diet  2. Ensure BID (Vanilla)  3. Monitor weight and PO intake  4. RD to follow     GOALS:     1. Ongoing: PO intake meets >75% of protein/calorie needs by 11/29  2. Met/Ongoing: Weight Maintenance (+/- 1-2 lb) by 12/1        ASSESSMENT:   Per BMI of 24.5, weight is in the normal classification. PO intake is fair. Continue Ensure BID for additional calories/protein. Labs noted. Nutrition recommendations listed. RD to follow. Nutrition Risk:  [] High  [x] Moderate []  Low    SUBJECTIVE/OBJECTIVE:     Pt with Gangrene; stage 4 ulcers in the superior aspect of the feet bilaterally right/o occlusion of the tibialis bilaterally. He has h/o MI, HTN, DM, CHF, CAD, CVA, COPD. Pt had less than 50% intake of lunch meal today but likes drinking Ensure supplements. Encouraged adequate intake. Will monitor. Information Obtained from:    [x] Chart Review   [x] Patient   [] Family/Caregiver   [] Nurse/Physician   [] Interdisciplinary Meeting/Rounds    Diet: Diabetic; 2 gm NA diet  Medications: [x] Reviewed (Lasix)   Allergies: [x] Reviewed   Encounter Diagnoses     ICD-10-CM ICD-9-CM   1. Pressure ulcer of dorsum of right foot, stage 4 (HCC) L89.894 707.09     707.24   2. TAD (acute kidney injury) ([facility] [address] N17.9 584.9   3. Dehydration E86.0 276.51   4.  Elevated troponin R74.8 790.6     Past Medical History:   Diagnosis Date    CAD (coronary artery disease)     S/P RCA stents in past    COPD (chronic obstructive pulmonary disease) ([facility] [address]     Diabetes ([facility] [address]     Hypertension     Incomplete bladder emptying     Pneumonia     Stroke (cerebrum) ([facility] [address]       Labs:    Lab Results   Component Value Date/Time    Sodium 141 11/24/2017 04:06 AM    Potassium 4.3 11/24/2017 04:06 AM    Chloride 110 11/24/2017 04:06 AM    CO2 26 11/24/2017 04:06 AM    Anion gap 5 11/24/2017 04:06 AM    Glucose 144 11/24/2017 04:06 AM    BUN 31 11/24/2017 04:06 AM Creatinine 1.51 11/24/2017 04:06 AM    Calcium 7.7 11/24/2017 04:06 AM    Magnesium 2.6 03/17/2016 05:48 AM    Phosphorus 2.8 02/02/2014 06:20 AM    Albumin 1.8 11/20/2017 03:49 AM     Anthropometrics: BMI (calculated): 24.5  Last 3 Recorded Weights in this Encounter    11/20/17 1243 11/22/17 0635 11/22/17 1320   Weight: 76.4 kg (168 lb 6.4 oz) 76.4 kg (168 lb 6.5 oz) 76.2 kg (168 lb)      Ht Readings from Last 1 Encounters:   11/22/17 5' 9.5\" (1.765 m)     Patient Vitals for the past 100 hrs:   % Diet Eaten   11/24/17 1401 25 %   11/24/17 0918 25 %   11/23/17 1802 75 %   11/23/17 1308 50 %   11/23/17 0940 100 %   11/22/17 1707 100 %   11/21/17 1827 100 %   11/21/17 1330 50 %     Nutrition Needs:   Calories: 7101-9643 Kcal   Protein:    g      [x] No Cultural, Moravian or ethnic dietary need identified.     [] Cultural, Moravian and ethnic food preferences identified and addressed     Wt Status:  [x] Normal (18.6 - 24.9) [] Underweight (<18.5) [] Overweight (25 - 29.9) [] Mild Obesity (30 - 34.9)  [] Moderate Obesity (35 - 39.9) [] Morbid Obesity (40+)     Nutrition Problems Identified:   [x] Suboptimal PO intake   [] Food Allergies  [] Difficulty chewing/swallowing/poor dentition  [] Constipation/Diarrhea   [] Nausea/Vomiting   [] None  [x] Other: Wound healing    Plan:   [x] Therapeutic Diet  [x]  Obtained/adjusted food preferences/tolerances and/or snacks options   [x]  Continue dietary supplements as prescribed   [] Occupational therapy following for feeding techniques  []  HS snack added   []  Modify diet texture   []  Modify diet for food allergies   []  Educate patient   []  Assist with menu selection   [x]  Monitor PO intake on meal rounds   [x]  Continue inpatient monitoring and intervention   []  Participated in discharge planning/Interdisciplinary rounds/Team meetings   []  Other:     Education Needs:   [] Not appropriate for teaching at this time due to:   [x] Identified and addressed    Nutrition Monitoring and Evaluation:  [x] Continue ongoing monitoring and intervention  [] Other    Manuel Tellez  Pager: [phone]

## 2017-11-25 LAB
ANION GAP SERPL CALC-SCNC: 7 MMOL/L (ref 3–18)
BACTERIA SPEC CULT: ABNORMAL
BASOPHILS # BLD: 0 K/UL (ref 0–0.06)
BASOPHILS NFR BLD: 0 % (ref 0–2)
BUN SERPL-MCNC: 30 MG/DL (ref 7–18)
BUN/CREAT SERPL: 20 (ref 12–20)
CALCIUM SERPL-MCNC: 7.8 MG/DL (ref 8.5–10.1)
CHLORIDE SERPL-SCNC: 108 MMOL/L (ref 100–108)
CO2 SERPL-SCNC: 26 MMOL/L (ref 21–32)
CREAT SERPL-MCNC: 1.53 MG/DL (ref 0.6–1.3)
DIFFERENTIAL METHOD BLD: ABNORMAL
EOSINOPHIL # BLD: 0.7 K/UL (ref 0–0.4)
EOSINOPHIL NFR BLD: 6 % (ref 0–5)
ERYTHROCYTE [DISTWIDTH] IN BLOOD BY AUTOMATED COUNT: 14.2 % (ref 11.6–14.5)
GLUCOSE BLD STRIP.AUTO-MCNC: 159 MG/DL (ref 70–110)
GLUCOSE BLD STRIP.AUTO-MCNC: 214 MG/DL (ref 70–110)
GLUCOSE SERPL-MCNC: 155 MG/DL (ref 74–99)
GRAM STN SPEC: ABNORMAL
GRAM STN SPEC: ABNORMAL
HCT VFR BLD AUTO: 25.7 % (ref 36–48)
HGB BLD-MCNC: 8.2 G/DL (ref 13–16)
LYMPHOCYTES # BLD: 1.7 K/UL (ref 0.9–3.6)
LYMPHOCYTES NFR BLD: 15 % (ref 21–52)
MCH RBC QN AUTO: 28.8 PG (ref 24–34)
MCHC RBC AUTO-ENTMCNC: 31.9 G/DL (ref 31–37)
MCV RBC AUTO: 90.2 FL (ref 74–97)
MONOCYTES # BLD: 0.5 K/UL (ref 0.05–1.2)
MONOCYTES NFR BLD: 4 % (ref 3–10)
NEUTS SEG # BLD: 8.6 K/UL (ref 1.8–8)
NEUTS SEG NFR BLD: 75 % (ref 40–73)
PLATELET # BLD AUTO: 385 K/UL (ref 135–420)
PMV BLD AUTO: 8.9 FL (ref 9.2–11.8)
POTASSIUM SERPL-SCNC: 4.9 MMOL/L (ref 3.5–5.5)
RBC # BLD AUTO: 2.85 M/UL (ref 4.7–5.5)
SERVICE CMNT-IMP: ABNORMAL
SODIUM SERPL-SCNC: 141 MMOL/L (ref 136–145)
WBC # BLD AUTO: 11.5 K/UL (ref 4.6–13.2)

## 2017-11-25 PROCEDURE — 74011250637 HC RX REV CODE- 250/637: Performed by: INTERNAL MEDICINE

## 2017-11-25 PROCEDURE — 36415 COLL VENOUS BLD VENIPUNCTURE: CPT | Performed by: HOSPITALIST

## 2017-11-25 PROCEDURE — 82962 GLUCOSE BLOOD TEST: CPT

## 2017-11-25 PROCEDURE — 85025 COMPLETE CBC W/AUTO DIFF WBC: CPT | Performed by: HOSPITALIST

## 2017-11-25 PROCEDURE — 65660000000 HC RM CCU STEPDOWN

## 2017-11-25 PROCEDURE — 74011636637 HC RX REV CODE- 636/637: Performed by: HOSPITALIST

## 2017-11-25 PROCEDURE — 74011250637 HC RX REV CODE- 250/637: Performed by: HOSPITALIST

## 2017-11-25 PROCEDURE — 80048 BASIC METABOLIC PNL TOTAL CA: CPT | Performed by: HOSPITALIST

## 2017-11-25 PROCEDURE — 74011250636 HC RX REV CODE- 250/636: Performed by: INTERNAL MEDICINE

## 2017-11-25 RX ADMIN — LEVOFLOXACIN 750 MG: 750 TABLET, FILM COATED ORAL at 09:21

## 2017-11-25 RX ADMIN — LOSARTAN POTASSIUM 100 MG: 50 TABLET ORAL at 09:20

## 2017-11-25 RX ADMIN — SODIUM BICARBONATE 650 MG TABLET 650 MG: at 09:19

## 2017-11-25 RX ADMIN — ASPIRIN 81 MG: 81 TABLET, COATED ORAL at 09:19

## 2017-11-25 RX ADMIN — TAMSULOSIN HYDROCHLORIDE 0.4 MG: 0.4 CAPSULE ORAL at 09:20

## 2017-11-25 RX ADMIN — METOPROLOL TARTRATE 25 MG: 25 TABLET, FILM COATED ORAL at 09:21

## 2017-11-25 RX ADMIN — INSULIN LISPRO 2 UNITS: 100 INJECTION, SOLUTION INTRAVENOUS; SUBCUTANEOUS at 09:29

## 2017-11-25 RX ADMIN — AMLODIPINE BESYLATE 10 MG: 10 TABLET ORAL at 09:21

## 2017-11-25 RX ADMIN — HYDROCODONE BITARTRATE AND ACETAMINOPHEN 2 TABLET: 5; 325 TABLET ORAL at 07:20

## 2017-11-25 RX ADMIN — INSULIN LISPRO 4 UNITS: 100 INJECTION, SOLUTION INTRAVENOUS; SUBCUTANEOUS at 13:08

## 2017-11-25 RX ADMIN — SODIUM CHLORIDE 1000 MG: 900 INJECTION, SOLUTION INTRAVENOUS at 19:00

## 2017-11-25 NOTE — PROGRESS NOTES
Problem: Pain    Goal: *Control of Pain  Outcome: Not Progressing Towards Goal  Pt reports pain not well controlled. However, pt refuses to be woken up during the night to be given pain meds    Problem: Diabetes Self-Management  Goal: *Disease process and treatment process  Define diabetes and identify own type of diabetes; list 3 options for treating diabetes. Outcome: Not Progressing Towards Goal  Pt refusing glucose checks/insulin admin HS    Goal: *Incorporating physical activity into lifestyle  Outcome: Not Progressing Towards Goal  Pt on bed rest    Goal: *Using medications safely  State effect of diabetes medications on diabetes; name diabetes medication taking, action and side effects. Outcome: Not Progressing Towards Goal  Pt refusing glucose checks/insulin admin HS    Goal: *Prevention, detection, treatment of acute complications  List symptoms of hyper- and hypoglycemia; describe how to treat low blood sugar and actions for lowering  high blood glucose level. Outcome: Not Progressing Towards Goal  Pt refusing glucose checks/insulin admin HS    Goal: *Prevention, detection and treatment of chronic complications  Define the natural course of diabetes and describe the relationship of blood glucose levels to long term complications of diabetes. Outcome: Not Progressing Towards Goal  Educated pt on consequences of poorly controlled blood glucose and wound healing    Problem: General Medical Care Plan    Goal: *Vital signs within specified parameters  Outcome: Not Progressing Towards Goal  Pt refusing vital signs monitoring as ordered    Goal: *Skin integrity maintained  Outcome: Not Progressing Towards Goal  Wound vac changed today    Goal: *Progressive mobility and function (eg: ADL's)  Outcome: Not Progressing Towards Goal  Pt remains in bed    Problem: Falls - Risk of    Goal: *Absence of Falls  Document Alban Fall Risk and appropriate interventions in the flowsheet.    Outcome: Progressing Towards Goal  Fall Risk Interventions:  Mobility Interventions: Communicate number of staff needed for ambulation/transfer, Patient to call before getting OOB     Medication Interventions: Patient to call before getting OOB    Elimination Interventions: Call light in reach, Patient to call for help with toileting needs, Toileting schedule/hourly rounds

## 2017-11-25 NOTE — PROGRESS NOTES
Progress Note      Patient: Parisa Lopez               Sex: male          DOA: 11/17/2017       YOB: 1950      Age:  78 y.o.        LOS:  LOS: 8 days             CHIEF COMPLAINT:    Subjective:     Nurse reports that pt has refusing his medications and he was sleeping at time of my evaluation. Objective:      Visit Vitals    /85 (BP 1 Location: Left arm, BP Patient Position: At rest)    Pulse (!) 58    Temp 98.1 °F (36.7 °C)    Resp 19    Ht 5' 9.5\" (1.765 m)    Wt 168 lb (76.2 kg)    SpO2 93%    BMI 24.45 kg/m2       Physical Exam:  GEN: Sleeping in no distress  CVS: Normal S1S2, RRR  RESP: CTAB  ABD: Soft, non distended, +BS  EXT: No edema noted and right foot wrapped in dressing  NEURO: Could not be assessed as sleeping        Lab/Data Reviewed:  CMP: No results found for: NA, K, CL, CO2, AGAP, GLU, BUN, CREA, GFRAA, GFRNA, CA, MG, PHOS, ALB, TBIL, TP, ALB, GLOB, AGRAT, SGOT, ALT, GPT  CBC:   Lab Results   Component Value Date/Time    WBC 11.5 11/25/2017 09:30 AM    HGB 8.2 (L) 11/25/2017 09:30 AM    HCT 25.7 (L) 11/25/2017 09:30 AM     11/25/2017 09:30 AM           Assessment/Plan     Active Problems:    Pain of foot due to ischemia when walking ([facility] [address] (11/17/2017)      Gangrene ([facility] [address] (11/19/2017)        Plan:  R and L foot ulcers and gangrene R heal - Continue analgesics as needed. Podiatry and vascular following and appreciate help. Continue Vancomycin and PO Levaquin. S/P debridement 11/22 Dr Kadeem Medrano who recommends wound vac on discharge. Elevated troponin: in setting of physiologic stress, Fevers, TAD and some sort of infection. No CP. No ACS at this time. SIRS - Fever and leukocytosis tresolved  Chronic systolic/diastolic cardiomyopathy EF 45 % to 50 %. There was mild diffuse hypokinesis.  grade 1 diastolic dysfunction 74/73  CAD s/p multiple stents to RCA , Most recent Cath 4/7/12 (Dr. Feliz Resendez) 3 vessel CAD with nonciritcal stenosis in the LAD and Cx with total occlusion of the very proximal RCA site of prior stenting, repeat stenting of RCA  HTN. Continue current meds and close blood pressure monitoring. HLD. Continue Lipitor 80 mg daily  DM - Glucose moderately controlled. COPD  PAD  Tobacco abuse - He stated that he smokes 1 ppd ongoing for several years - Counseled extensively and advised starting nicotine patch, but he refused.   DVT  H/O Non-compliance per previous chart and records  Disposition: He was supposed to be discharged back to [facility] yesterday, but per my discussion with the  yesterday, discharge was postponed until Monday, as [employer] cannot get the wound vac until Monday.        Rock Koch DO, MPH  Internal Medicine

## 2017-11-25 NOTE — PROGRESS NOTES
0500  Pt refused morning lab work. Bedside and Verbal shift change report given to [address] [place] [address] by Adi Galvez RN. Report included the following information SBAR, Kardex, Intake/Output and MAR.

## 2017-11-25 NOTE — PROGRESS NOTES
conducted a Follow up consultation and Spiritual Assessment for Veronique Fink, who is a 78 y.o.,male. The  provided the following Interventions:  Continued the relationship of care and support. Listened empathically. Offered prayer and assurance of continued prayer on patients behalf. Chart reviewed. The following outcomes were achieved:  Patient expressed gratitude for 's visit. Assessment:  There are no spiritual or Presybeterian issues which require intervention at this time. Plan:  Chaplains will continue to follow and will provide pastoral care on an as needed/requested basis.  recommends bedside caregivers page  on duty if patient shows signs of acute spiritual or emotional distress. Ruba Hackett M.Div.   [facility] [address]  [phone]

## 2017-11-25 NOTE — PROGRESS NOTES
0935:  Pt adamantly refusing PT stating \"I am tired, they kept [email or web address] messing with my foot. \"  Will follow up later today. 1238:  2nd attempt, pt immediately stated \"I am not going to do anything today\" upon me opening the door. Will follow up tomorrow.

## 2017-11-25 NOTE — PROGRESS NOTES
Problem: Falls - Risk of  Goal: *Absence of Falls  Document Alban Fall Risk and appropriate interventions in the flowsheet.    Outcome: Progressing Towards Goal  Fall Risk Interventions:  Mobility Interventions: Communicate number of staff needed for ambulation/transfer, Patient to call before getting OOB         Medication Interventions: Patient to call before getting OOB    Elimination Interventions: Call light in reach, Patient to call for help with toileting needs, Toileting schedule/hourly rounds

## 2017-11-25 NOTE — PROGRESS NOTES
Assumed care of patient, patient in bed. Pain medication given to patient, pain 10/10. Medication given. Call bell in reach, bed in the lowest position. Sbar report received from [employer].        930: Patient refuse medications, Dr. Heather Fox informed of patient refusal.     1800 Patient refused all 1800 medications, including his finger stick and Insulin coverage.

## 2017-11-25 NOTE — ROUTINE PROCESS
Bedside report given to Wilder RN (oncoming nurse) by China Ojeda RN (offgoing nurse). Patient alert and oriented x4, resting in bed, with bed locked and in lowest position. Call bell in reached.

## 2017-11-26 LAB
ANION GAP SERPL CALC-SCNC: 6 MMOL/L (ref 3–18)
BASOPHILS # BLD: 0 K/UL (ref 0–0.06)
BASOPHILS NFR BLD: 0 % (ref 0–2)
BUN SERPL-MCNC: 31 MG/DL (ref 7–18)
BUN/CREAT SERPL: 21 (ref 12–20)
CALCIUM SERPL-MCNC: 7.7 MG/DL (ref 8.5–10.1)
CHLORIDE SERPL-SCNC: 109 MMOL/L (ref 100–108)
CO2 SERPL-SCNC: 27 MMOL/L (ref 21–32)
CREAT SERPL-MCNC: 1.46 MG/DL (ref 0.6–1.3)
DATE LAST DOSE: ABNORMAL
DIFFERENTIAL METHOD BLD: ABNORMAL
EOSINOPHIL # BLD: 0.6 K/UL (ref 0–0.4)
EOSINOPHIL NFR BLD: 5 % (ref 0–5)
ERYTHROCYTE [DISTWIDTH] IN BLOOD BY AUTOMATED COUNT: 14.2 % (ref 11.6–14.5)
GLUCOSE BLD STRIP.AUTO-MCNC: 166 MG/DL (ref 70–110)
GLUCOSE BLD STRIP.AUTO-MCNC: 175 MG/DL (ref 70–110)
GLUCOSE BLD STRIP.AUTO-MCNC: 189 MG/DL (ref 70–110)
GLUCOSE BLD STRIP.AUTO-MCNC: 235 MG/DL (ref 70–110)
GLUCOSE SERPL-MCNC: 168 MG/DL (ref 74–99)
HCT VFR BLD AUTO: 24.2 % (ref 36–48)
HGB BLD-MCNC: 7.8 G/DL (ref 13–16)
LYMPHOCYTES # BLD: 1.6 K/UL (ref 0.9–3.6)
LYMPHOCYTES NFR BLD: 13 % (ref 21–52)
MCH RBC QN AUTO: 28.8 PG (ref 24–34)
MCHC RBC AUTO-ENTMCNC: 32.2 G/DL (ref 31–37)
MCV RBC AUTO: 89.3 FL (ref 74–97)
MONOCYTES # BLD: 0.5 K/UL (ref 0.05–1.2)
MONOCYTES NFR BLD: 4 % (ref 3–10)
NEUTS SEG # BLD: 9.7 K/UL (ref 1.8–8)
NEUTS SEG NFR BLD: 78 % (ref 40–73)
PLATELET # BLD AUTO: 359 K/UL (ref 135–420)
PMV BLD AUTO: 9 FL (ref 9.2–11.8)
POTASSIUM SERPL-SCNC: 4.7 MMOL/L (ref 3.5–5.5)
RBC # BLD AUTO: 2.71 M/UL (ref 4.7–5.5)
REPORTED DOSE,DOSE: ABNORMAL UNITS
REPORTED DOSE/TIME,TMG: 1900
SODIUM SERPL-SCNC: 142 MMOL/L (ref 136–145)
VANCOMYCIN TROUGH SERPL-MCNC: 24.2 UG/ML (ref 10–20)
WBC # BLD AUTO: 12.4 K/UL (ref 4.6–13.2)

## 2017-11-26 PROCEDURE — 74011250637 HC RX REV CODE- 250/637: Performed by: INTERNAL MEDICINE

## 2017-11-26 PROCEDURE — 74011250636 HC RX REV CODE- 250/636: Performed by: HOSPITALIST

## 2017-11-26 PROCEDURE — 82962 GLUCOSE BLOOD TEST: CPT

## 2017-11-26 PROCEDURE — 74011250637 HC RX REV CODE- 250/637: Performed by: HOSPITALIST

## 2017-11-26 PROCEDURE — 80048 BASIC METABOLIC PNL TOTAL CA: CPT | Performed by: HOSPITALIST

## 2017-11-26 PROCEDURE — 80202 ASSAY OF VANCOMYCIN: CPT | Performed by: INTERNAL MEDICINE

## 2017-11-26 PROCEDURE — 74011636637 HC RX REV CODE- 636/637: Performed by: HOSPITALIST

## 2017-11-26 PROCEDURE — 85025 COMPLETE CBC W/AUTO DIFF WBC: CPT | Performed by: HOSPITALIST

## 2017-11-26 PROCEDURE — 36415 COLL VENOUS BLD VENIPUNCTURE: CPT | Performed by: HOSPITALIST

## 2017-11-26 PROCEDURE — 65660000000 HC RM CCU STEPDOWN

## 2017-11-26 RX ORDER — AMOXICILLIN AND CLAVULANATE POTASSIUM 875; 125 MG/1; MG/1
1 TABLET, FILM COATED ORAL EVERY 12 HOURS
Status: DISCONTINUED | OUTPATIENT
Start: 2017-11-26 | End: 2017-11-27 | Stop reason: HOSPADM

## 2017-11-26 RX ORDER — FENTANYL 25 UG/1
1 PATCH TRANSDERMAL
Status: DISCONTINUED | OUTPATIENT
Start: 2017-11-26 | End: 2017-11-27 | Stop reason: HOSPADM

## 2017-11-26 RX ADMIN — ATORVASTATIN CALCIUM 80 MG: 40 TABLET, FILM COATED ORAL at 08:32

## 2017-11-26 RX ADMIN — SODIUM BICARBONATE 650 MG TABLET 650 MG: at 08:32

## 2017-11-26 RX ADMIN — SODIUM BICARBONATE 650 MG TABLET 650 MG: at 18:56

## 2017-11-26 RX ADMIN — TAMSULOSIN HYDROCHLORIDE 0.4 MG: 0.4 CAPSULE ORAL at 08:32

## 2017-11-26 RX ADMIN — METOPROLOL TARTRATE 25 MG: 25 TABLET, FILM COATED ORAL at 08:33

## 2017-11-26 RX ADMIN — FUROSEMIDE 20 MG: 10 INJECTION, SOLUTION INTRAMUSCULAR; INTRAVENOUS at 18:00

## 2017-11-26 RX ADMIN — GUAIFENESIN 600 MG: 600 TABLET, EXTENDED RELEASE ORAL at 08:33

## 2017-11-26 RX ADMIN — INSULIN LISPRO 2 UNITS: 100 INJECTION, SOLUTION INTRAVENOUS; SUBCUTANEOUS at 08:28

## 2017-11-26 RX ADMIN — AMLODIPINE BESYLATE 10 MG: 10 TABLET ORAL at 08:40

## 2017-11-26 RX ADMIN — AMOXICILLIN AND CLAVULANATE POTASSIUM 1 TABLET: 875; 125 TABLET, FILM COATED ORAL at 16:34

## 2017-11-26 RX ADMIN — LOSARTAN POTASSIUM 100 MG: 50 TABLET ORAL at 08:31

## 2017-11-26 RX ADMIN — SODIUM BICARBONATE 650 MG TABLET 650 MG: at 16:35

## 2017-11-26 RX ADMIN — INSULIN LISPRO 4 UNITS: 100 INJECTION, SOLUTION INTRAVENOUS; SUBCUTANEOUS at 18:57

## 2017-11-26 RX ADMIN — GABAPENTIN 400 MG: 400 CAPSULE ORAL at 16:34

## 2017-11-26 RX ADMIN — DULOXETINE 60 MG: 60 CAPSULE, DELAYED RELEASE ORAL at 08:33

## 2017-11-26 RX ADMIN — FINASTERIDE 5 MG: 5 TABLET, FILM COATED ORAL at 08:30

## 2017-11-26 RX ADMIN — ASPIRIN 81 MG: 81 TABLET, COATED ORAL at 08:33

## 2017-11-26 RX ADMIN — MIRABEGRON 25 MG: 25 TABLET, FILM COATED, EXTENDED RELEASE ORAL at 08:30

## 2017-11-26 RX ADMIN — GUAIFENESIN 600 MG: 600 TABLET, EXTENDED RELEASE ORAL at 18:56

## 2017-11-26 RX ADMIN — FUROSEMIDE 20 MG: 10 INJECTION, SOLUTION INTRAMUSCULAR; INTRAVENOUS at 08:32

## 2017-11-26 RX ADMIN — GABAPENTIN 400 MG: 400 CAPSULE ORAL at 08:32

## 2017-11-26 NOTE — PROGRESS NOTES
Problem: Falls - Risk of  Goal: *Absence of Falls  Document Alban Fall Risk and appropriate interventions in the flowsheet. Fall Risk Interventions:  Mobility Interventions: Patient to call before getting OOB, PT Consult for mobility concerns         Medication Interventions: Patient to call before getting OOB    Elimination Interventions: Patient to call for help with toileting needs, Call light in reach             Problem: Diabetes Self-Management  Goal: *Incorporating nutritional management into lifestyle  Describe effect of type, amount and timing of food on blood glucose; list 3 methods for planning meals. Outcome: Not Progressing Towards Goal  Pt refuses blood sugar checks   Goal: *Using medications safely  State effect of diabetes medications on diabetes; name diabetes medication taking, action and side effects. Outcome: Not Progressing Towards Goal  Pt refuses medication and cares. He states \" I don't want to be bothered. \"  Goal: *Monitoring blood glucose, interpreting and using results  Identify recommended blood glucose targets  and personal targets. Outcome: Not Progressing Towards Goal  Pt refuses  blood sugar check and insulin. Goal: *Prevention, detection, treatment of acute complications  List symptoms of hyper- and hypoglycemia; describe how to treat low blood sugar and actions for lowering  high blood glucose level. Outcome: Not Progressing Towards Goal  Pt non compliant with the care. Goal: *Developing strategies to address psychosocial issues  Describe feelings about living with diabetes; identify support needed and support network   Outcome: Not Progressing Towards Goal  Pt refuses care and health education. Problem: General Medical Care Plan  Goal: *Vital signs within specified parameters  Outcome: Not Progressing Towards Goal  Pt refuses vitals sign to be taken.

## 2017-11-26 NOTE — PROGRESS NOTES
Podiatry Surgery Progress Note    Patient: Makenna Marie MRN: [id number] LOS: 8     YOB: 1950  Age: 78 y.o. Sex: male        Admit Date: 11/17/2017    POD 3 Days Post-Op  Procedure:   Procedure(s):  RIGHT FOOT DEBRIDEMENT AND INCISION AND DRAINAGE WITH APPLICATION OF WOUND VAC. Subjective:      Ivelisse moncada 68 y/o seen at bedside this morning  state over all feeling much better.  Patient is currently watching TV, eating breakfast, resting quiet and no apparent distress.       Review of Systems:  Other than the above HPI  General: denies chronic fatigue, weight loss,   HEENT: denies ringing in ears, dizzy spells, sinus trouble, hoarseness  GI: denies bloating, heartburn, regurgitation, difficulty and or painful swallowing, nausea  Lungs: denies cough, SOB, hemoptysis  Heart: denies any current chest pain, irregular heart beat  Skin: denies rashes, hives, allergic reaction  Urinary: denies UTI, kidney stones   Bones and Joints: denies back pain, gout, osteoporosis  Neurologic: denies headaches, numbness    Objective:     Vitals  No data found. I/O Current Shift       I/O Last Three Shifts  11/24 0701 - 11/25 1900  In: 1 [P.O.:720; I.V.:250]  Out: 2050 [Urine:2050]      Data Review  Recent Results (from the past 24 hour(s))   GLUCOSE, POC    Collection Time: 11/25/17  7:43 AM   Result Value Ref Range    Glucose (POC) 159 (H) 70 - 110 mg/dL   CBC WITH AUTOMATED DIFF    Collection Time: 11/25/17  9:30 AM   Result Value Ref Range    WBC 11.5 4.6 - 13.2 K/uL    RBC 2.85 (L) 4.70 - 5.50 M/uL    HGB 8.2 (L) 13.0 - 16.0 g/dL    HCT 25.7 (L) 36.0 - 48.0 %    MCV 90.2 74.0 - 97.0 FL    MCH 28.8 24.0 - 34.0 PG    MCHC 31.9 31.0 - 37.0 g/dL    RDW 14.2 11.6 - 14.5 %    PLATELET 288 701 - 571 K/uL    MPV 8.9 (L) 9.2 - 11.8 FL    NEUTROPHILS 75 (H) 40 - 73 %    LYMPHOCYTES 15 (L) 21 - 52 %    MONOCYTES 4 3 - 10 %    EOSINOPHILS 6 (H) 0 - 5 %    BASOPHILS 0 0 - 2 %    ABS.  NEUTROPHILS 8.6 (H) 1.8 - 8.0 K/UL    ABS. LYMPHOCYTES 1.7 0.9 - 3.6 K/UL    ABS. MONOCYTES 0.5 0.05 - 1.2 K/UL    ABS. EOSINOPHILS 0.7 (H) 0.0 - 0.4 K/UL    ABS. BASOPHILS 0.0 0.0 - 0.06 K/UL    DF AUTOMATED     METABOLIC PANEL, BASIC    Collection Time: 11/25/17  9:30 AM   Result Value Ref Range    Sodium 141 136 - 145 mmol/L    Potassium 4.9 3.5 - 5.5 mmol/L    Chloride 108 100 - 108 mmol/L    CO2 26 21 - 32 mmol/L    Anion gap 7 3.0 - 18 mmol/L    Glucose 155 (H) 74 - 99 mg/dL    BUN 30 (H) 7.0 - 18 MG/DL    Creatinine 1.53 (H) 0.6 - 1.3 MG/DL    BUN/Creatinine ratio 20 12 - 20      GFR est AA 55 (L) >60 ml/min/1.73m2    GFR est non-AA 46 (L) >60 ml/min/1.73m2    Calcium 7.8 (L) 8.5 - 10.1 MG/DL   GLUCOSE, POC    Collection Time: 11/25/17 12:09 PM   Result Value Ref Range    Glucose (POC) 214 (H) 70 - 110 mg/dL       Physical Exam:     Eduard Golden  is a 78 y.o. male. Pleasant, alert and oriented x3, in no apparent distress. Patient is well-developed and nourished, with good attention to hygiene and body habitus. Mood and affect normal, appropriate to situation.      Lower Extremity Exam:   Post-op: Wound VAC intact and pulling at 125 mmHg.  The periwound area NO erythema,  very mild edema and NO increase temp. Wound bed red and with some granular tissue. Some mild yellow slough.       VASCULAR EXAM:. Pedal pulses are palpable right. Skin temperature is warm to warmer right and left foot. Digital capillary fill time is 3 seconds right foot.  There is edema of the right and left foot and ankle.       NEUROLOGICAL EXAM:. Sensation Intact with palpation right foot.      MUSCULOSKELETAL EXAM:. Muscle tone is normal. Muscle strength of the flexor and extensor group inversion and eversion Bilateral. 4/5.       DERMATOLOGICAL EXAM:. Skin is of abnormal texture and turgor with atrophic skin changes noting hair growth, nail changes (thickening), Bilateral       Wound Presentation:  Wound Foot Anterior;Right;Plantar (Active)   DRESSING STATUS Changed per order 11/24/2017  7:30 PM   DRESSING TYPE Wet to dry 11/24/2017  7:30 PM   Non-Pressure Injury Full thickness (subcut/muscle) 11/24/2017  7:30 PM   Pressure Injury Unstageable 11/20/2017 10:15 AM   Tissue Type Red;Pink 11/24/2017  7:30 PM   Drainage Amount  Moderate 11/24/2017  7:30 PM   Drainage Color Sanguinous 11/24/2017  7:30 PM   Wound Odor None 11/24/2017  7:30 PM   Periwound Skin Condition Erythema, blanchable 11/24/2017  7:30 PM   Cleansing and Cleansing Agents  Normal saline 11/24/2017  7:30 PM   Dressing Type Applied Vacuum dressing;Elastic bandage 11/24/2017  7:30 PM   Procedure Tolerated Well 11/24/2017  7:30 PM   Number of days:8       Wound Heel (Active)   DRESSING STATUS Clean, dry, and intact 11/22/2017 12:46 AM   DRESSING TYPE Foam 11/22/2017 12:46 AM   Drainage Amount  None 11/22/2017 12:46 AM   Drainage Color Serosanguinous 11/20/2017 10:15 AM   Wound Odor None 11/22/2017 12:46 AM   Periwound Skin Condition Other (comment) 11/20/2017 10:15 AM   Cleansing and Cleansing Agents  Dermal wound cleanser 11/20/2017 10:15 AM   Dressing Type Applied Foam 11/20/2017 10:15 AM   Procedure Tolerated Well 11/20/2017 10:15 AM   Number of days:5       Wound Foot Dorsal;Right (Active)   DRESSING STATUS Breakthrough drainage 11/22/2017  3:00 PM   DRESSING TYPE Gauze wrap (rell); Elastic bandage 11/22/2017  3:00 PM   Incision site well approximated? No 11/22/2017  1:00 PM   Number of days:3       Wound Heel Right (Active)   DRESSING STATUS Breakthrough drainage 11/22/2017  3:00 PM   DRESSING TYPE Elastic bandage 11/22/2017  3:00 PM   Incision site well approximated? No 11/22/2017  1:00 PM   Number of days:3              Assessment:     Active Problems:    Pain of foot due to ischemia when walking ([address] (11/17/2017)      Gangrene ([address] (11/19/2017)          Plan/Recommendations/Medical Decision Making:     Discussed with the patient all the above today's clinical findings.  We reviewed again all the surgical findings along with overall treatment plan. Once again discussed in great detail the importance of NOT smoking.  He indicated that he understood and all questions answered.       Continue the present treatment of IVabx and Wound VAC      From a Podiatric Surgery point of view OK to discharge back to SNF with wound VAC and follow up in the wound care center in about a week after discharge      Dr Christelle [facility]  C) [phone]  11/25/2017  9:01 PM

## 2017-11-26 NOTE — PROGRESS NOTES
Daily Progress Note: 11/26/2017 4:46 PM   Admit Date: 11/17/2017    Patient seen in follow up for multiple medical problems as listed below:  Patient Active Problem List   Diagnosis Code    Hypertension I10    CAD (coronary artery disease) I25.10    Hyperlipidemia E78.5    Hip pain M25.559    Chest pain R07.9    COPD (chronic obstructive pulmonary disease) ([facility]) J44.9    Tobacco abuse Z72.0    DVT (deep venous thrombosis) ([facility]) I82.409    PNA (pneumonia) J18.9    Pain of foot due to ischemia when walking ([facility]) I73.9    Gangrene ([facility] I96       Assesment     Admitted with Fever, Altered mental status and also TAD. Found to have stage 4 ulcers on R foot and and ulceration top of left foot, ischemia R heal. Cardiology and vascular consulted. Incidental finding of elevated troponin with normal CK. Podiatry consulted, recommend better vascular supply prior to any foot surgery s/p angiography and fempop bypass 11/21. No wound cultures. S/p  I&D and debridement of necrotic tissue 11/22 Dr Yash Jolly. Awaiting wound Vac. - R and L foot ulcers and gangrene R heal - Podiatry and vascular consulting. On IV Abx. Debridement 11/22 Dr Yash Jolly  - Elevated troponin: in setting of physiologic stress, Fevers, TAD and some sort of infection. No CP. No ACS at this time. - Fever, Leukocytosis: SIRS  - Chronic systolic/diastolic cardiomyopathy EF 45 % to 50 %. There was mild diffuse hypokinesis. grade 1 diastolic dysfunction 91/10  - CAD s/p multiple stents to RCA , Most recent Cath 4/7/12 (Dr. Lisa De Paz) 3 vessel CAD with nonciritcal stenosis in the LAD and Cx with total occlusion of the very proximal RCA site of prior stenting, repeat stenting of RCA  - HTN  - HLD  - DM  - COPD  - PAD  - Tobacco abuse  - DVT  - H/O Non-compliance per previous chart and records    DVT Protocol Active: Y  Code Status:  Full Code     Disposition: Req 24h. wound vac need?  Return to [facility] with wound vac and antibiotics    Subjective: CC: Shortness of Breath    Interval History: no overnight issues. Wound vac is causing him R foot pain since placement. Awaiting wound vac delivery [facility]. Wound E Faecalis and Staph changing to Augmentin. Plan 7d post surgical treatment    ROS: 11 point ROS -lots of pain    Objective:     Visit Vitals    /69 (BP 1 Location: Left arm, BP Patient Position: At rest)    Pulse 63    Temp 98.1 °F (36.7 °C)    Resp 19    Ht 5' 9.5\" (1.765 m)    Wt 76.2 kg (168 lb)    SpO2 93%    BMI 24.45 kg/m2       Temp (24hrs), Av.1 °F (36.7 °C), Min:98.1 °F (36.7 °C), Max:98.1 °F (36.7 °C)        Intake/Output Summary (Last 24 hours) at 17 1408  Last data filed at 17 0848   Gross per 24 hour   Intake              720 ml   Output              200 ml   Net              520 ml       Gen: AOx3, NAD  HEENT:  TERI, EOMI. Neck: No Bruits/JVD   Lungs:   CTAB. Good respiratory effort  Heart:   RR S1 S2 without M/R/G  Abdomen: ND,NT, BSX4,   Extremities:   No LE edema. R foot wrapped. Minimal pain. Wound vac in place. Skin:  no jaundice.  See above      Data Review:     Meds/Labs/Tests reviewed    Current Shift:  701 - 1900  In: 480 [P.O.:480]  Out: 200 [Urine:200]  Last three shifts:  1901 -  07  In: 730 [P.O.:480; I.V.:250]  Out: 825 [Urine:825]  Recent Labs      17   0705  17   0930   WBC  12.4  11.5   RBC  2.71*  2.85*   HGB  7.8*  8.2*   HCT  24.2*  25.7*   PLT  359  385   GRANS  78*  75*   LYMPH  13*  15*   EOS  5  6*       Recent Labs      17   0705  17   0930  17   0406   BUN  31*  30*  31*   CREA  1.46*  1.53*  1.51*   CA  7.7*  7.8*  7.7*   K  4.7  4.9  4.3   NA  142  141  141   CL  109*  108  110*   CO2  27  26  26   GLU  168*  155*  144*        Lab Results   Component Value Date/Time    Glucose 168 2017 07:05 AM    Glucose 155 2017 09:30 AM    Glucose 144 2017 04:06 AM    Glucose 160 2017 04:47 AM Glucose 138 11/22/2017 03:53 AM          Care coordination with Nursing/Consultants/staff: 15  Prior history, labs, and charting reviewed: 15    Procedures/Imaging:  Angiography LE 11/21 -   NM ANGIO AORTOGRAM ABD SERIAL [81339 (CPT®)]      IR ANGIO EXT LOWER RT [UGP0102]     ATHERECTOMY PERCUT,FEM-POP [13192 (CPT®)]     IR ANGIOPLASTY POPLITEAL FEM [[id number]]     11/22 I&D and debridement of necrotic tissue    Total time spent with chart review, patient examination/education, discussion with staff on case,documentation and medication management / adjustment  :  30 Minutes      Dr Carlos Reed  [phone]

## 2017-11-26 NOTE — PROGRESS NOTES
Assumed care of patient, patient stated\" He is more cooperative in the morning\". He dose not want any pain medication. Wound back in place and draining appropriately.  Sbar report received from Raghavendra Colvin.     [id number] Per Dr. Cool Fentanyl patch increase to 25mcg      0617 Spoke with patient and he refused to fentanyl patch, he did not have a fentanyl patch on.     1910 Sbar report given to Abdullahi Obando, \" Patient stated he did not want any medications and did not want to be disturb until 7am

## 2017-11-26 NOTE — PROGRESS NOTES
Assumed pt care after bedside report from [place], [place]. When nurses went to pt room to get report immediately after knocking and getting in pt stated \" get out of here. \" I don't want to be bothered I just want to sleep''  Nurse explained to pt about hourly rounding to make sure he is doing well and the wound vac in working as ordered. Pt shouted \" I don't care leave me alone get out of the room and close the door so that I can go to sleep. \"    8179- Pt refused all his scheduled med . He also refused blood sugar to be checked. . Dr Janet Jon was notified about pt's refusal if med and cares. 0137- Pt refused vitals to be taken. 0630- Pt put on the call light and asked for coffee and extra blankets. All were provided as requested. Pt in bed awake resting in no distress. Wound vac working well at 125 mmHg. Serosanguinous drainage in canister around 70 ml. Wound dressing intact, pt was able to move toes and has good sensations. IVF at [facility]. Condom cath in place draining well. 1930- Bedside and Verbal shift change report given to [place], RN (oncoming nurse) by Sha Quinn RN (offgoing nurse). Report included the following information SBAR, Kardex, Intake/Output, MAR and Recent Results.

## 2017-11-26 NOTE — PROGRESS NOTES
1110:  Pt refusing PT stating he is too tired. Will follow up later today. 2nd attempt, pt called the nurses station and asked that I return. Pt asking me to take him out side to smoke. Politely informed pt I can not do that and this is a non smoking facility. I offer transfer and w/c training but not outside. Pt stated \"get out and find someone who can take me outside. \"

## 2017-11-27 VITALS
TEMPERATURE: 98.6 F | HEIGHT: 70 IN | DIASTOLIC BLOOD PRESSURE: 63 MMHG | OXYGEN SATURATION: 93 % | SYSTOLIC BLOOD PRESSURE: 157 MMHG | WEIGHT: 170 LBS | BODY MASS INDEX: 24.34 KG/M2 | RESPIRATION RATE: 16 BRPM | HEART RATE: 66 BPM

## 2017-11-27 LAB
BACTERIA SPEC CULT: NORMAL
GLUCOSE BLD STRIP.AUTO-MCNC: 153 MG/DL (ref 70–110)
GLUCOSE BLD STRIP.AUTO-MCNC: 198 MG/DL (ref 70–110)
SERVICE CMNT-IMP: NORMAL

## 2017-11-27 PROCEDURE — 74011250636 HC RX REV CODE- 250/636: Performed by: HOSPITALIST

## 2017-11-27 PROCEDURE — 74011250637 HC RX REV CODE- 250/637: Performed by: HOSPITALIST

## 2017-11-27 PROCEDURE — 82962 GLUCOSE BLOOD TEST: CPT

## 2017-11-27 PROCEDURE — 74011250637 HC RX REV CODE- 250/637: Performed by: INTERNAL MEDICINE

## 2017-11-27 PROCEDURE — 74011636637 HC RX REV CODE- 636/637: Performed by: HOSPITALIST

## 2017-11-27 RX ORDER — AMOXICILLIN AND CLAVULANATE POTASSIUM 875; 125 MG/1; MG/1
1 TABLET, FILM COATED ORAL EVERY 12 HOURS
Qty: 6 TAB | Refills: 0 | Status: SHIPPED | OUTPATIENT
Start: 2017-11-27 | End: 2017-11-30

## 2017-11-27 RX ADMIN — GUAIFENESIN 600 MG: 600 TABLET, EXTENDED RELEASE ORAL at 09:24

## 2017-11-27 RX ADMIN — HYDROCODONE BITARTRATE AND ACETAMINOPHEN 2 TABLET: 5; 325 TABLET ORAL at 09:29

## 2017-11-27 RX ADMIN — TAMSULOSIN HYDROCHLORIDE 0.4 MG: 0.4 CAPSULE ORAL at 09:24

## 2017-11-27 RX ADMIN — SODIUM BICARBONATE 650 MG TABLET 650 MG: at 09:23

## 2017-11-27 RX ADMIN — LOSARTAN POTASSIUM 100 MG: 50 TABLET ORAL at 09:23

## 2017-11-27 RX ADMIN — ASPIRIN 81 MG: 81 TABLET, COATED ORAL at 09:23

## 2017-11-27 RX ADMIN — FINASTERIDE 5 MG: 5 TABLET, FILM COATED ORAL at 09:24

## 2017-11-27 RX ADMIN — INSULIN LISPRO 2 UNITS: 100 INJECTION, SOLUTION INTRAVENOUS; SUBCUTANEOUS at 09:24

## 2017-11-27 RX ADMIN — GABAPENTIN 400 MG: 400 CAPSULE ORAL at 09:24

## 2017-11-27 RX ADMIN — ATORVASTATIN CALCIUM 80 MG: 40 TABLET, FILM COATED ORAL at 09:24

## 2017-11-27 RX ADMIN — AMLODIPINE BESYLATE 10 MG: 10 TABLET ORAL at 09:23

## 2017-11-27 RX ADMIN — DULOXETINE 60 MG: 60 CAPSULE, DELAYED RELEASE ORAL at 09:24

## 2017-11-27 RX ADMIN — MIRABEGRON 25 MG: 25 TABLET, FILM COATED, EXTENDED RELEASE ORAL at 09:23

## 2017-11-27 RX ADMIN — AMOXICILLIN AND CLAVULANATE POTASSIUM 1 TABLET: 875; 125 TABLET, FILM COATED ORAL at 09:23

## 2017-11-27 RX ADMIN — METOPROLOL TARTRATE 25 MG: 25 TABLET, FILM COATED ORAL at 09:24

## 2017-11-27 NOTE — ROUTINE PROCESS
Bedside and Verbal shift change report given to Idalia Moon RN (oncoming nurse) by Kristel Hunter RN (offgoing nurse). Report included the following information SBAR, Kardex, Intake/Output, MAR and Recent Results.

## 2017-11-27 NOTE — PROGRESS NOTES
Problem: Falls - Risk of  Goal: *Absence of Falls  Document Alban Fall Risk and appropriate interventions in the flowsheet. Outcome: Progressing Towards Goal  Fall Risk Interventions:  Mobility Interventions: Patient to call before getting OOB     Medication Interventions: Patient to call before getting OOB    Elimination Interventions: Patient to call for help with toileting needs, Call light in reach     Problem: Diabetes Self-Management  Goal: *Disease process and treatment process  Define diabetes and identify own type of diabetes; list 3 options for treating diabetes. Outcome: Not Progressing Towards Goal  Pt refuses blood sugar checks and insulin  Goal: *Prevention, detection and treatment of chronic complications  Define the natural course of diabetes and describe the relationship of blood glucose levels to long term complications of diabetes.    Outcome: Progressing Towards Goal

## 2017-11-27 NOTE — DISCHARGE INSTRUCTIONS
DISCHARGE SUMMARY from Nurse    PATIENT INSTRUCTIONS:    After general anesthesia or intravenous sedation, for 24 hours or while taking prescription Narcotics:  · Limit your activities  · Do not drive and operate hazardous machinery  · Do not make important personal or business decisions  · Do  not drink alcoholic beverages  · If you have not urinated within 8 hours after discharge, please contact your surgeon on call. Report the following to your surgeon:  · Excessive pain, swelling, redness or odor of or around the surgical area  · Temperature over 100.5  · Nausea and vomiting lasting longer than 4 hours or if unable to take medications  · Any signs of decreased circulation or nerve impairment to extremity: change in color, persistent  numbness, tingling, coldness or increase pain  · Any questions    What to do at Home:  Recommended activity: Activity as tolerated. If you experience any of the following symptoms increased pain, swelling, warmth, pain, please follow up with your primary care provider, podiatrist, or local ED. *  Please give a list of your current medications to your Primary Care Provider. *  Please update this list whenever your medications are discontinued, doses are      changed, or new medications (including over-the-counter products) are added. *  Please carry medication information at all times in case of emergency situations. These are general instructions for a healthy lifestyle:    No smoking/ No tobacco products/ Avoid exposure to second hand smoke  Surgeon General's Warning:  Quitting smoking now greatly reduces serious risk to your health.     Obesity, smoking, and sedentary lifestyle greatly increases your risk for illness    A healthy diet, regular physical exercise & weight monitoring are important for maintaining a healthy lifestyle    You may be retaining fluid if you have a history of heart failure or if you experience any of the following symptoms:  Weight gain of 3 pounds or more overnight or 5 pounds in a week, increased swelling in our hands or feet or shortness of breath while lying flat in bed. Please call your doctor as soon as you notice any of these symptoms; do not wait until your next office visit. Recognize signs and symptoms of STROKE:    F-face looks uneven    A-arms unable to move or move unevenly    S-speech slurred or non-existent    T-time-call 911 as soon as signs and symptoms begin-DO NOT go       Back to bed or wait to see if you get better-TIME IS BRAIN. Warning Signs of HEART ATTACK     Call 911 if you have these symptoms:   Chest discomfort. Most heart attacks involve discomfort in the center of the chest that lasts more than a few minutes, or that goes away and comes back. It can feel like uncomfortable pressure, squeezing, fullness, or pain.  Discomfort in other areas of the upper body. Symptoms can include pain or discomfort in one or both arms, the back, neck, jaw, or stomach.  Shortness of breath with or without chest discomfort.  Other signs may include breaking out in a cold sweat, nausea, or lightheadedness. Don't wait more than five minutes to call 911 - MINUTES MATTER! Fast action can save your life. Calling 911 is almost always the fastest way to get lifesaving treatment. Emergency Medical Services staff can begin treatment when they arrive -- up to an hour sooner than if someone gets to the hospital by car. Patient armband removed and shredded. The discharge information has been reviewed with the patient. The patient verbalized understanding. Discharge medications reviewed with the patient and appropriate educational materials and side effects teaching were provided.   ___________________________________________________________________________________________________________________________________

## 2017-11-27 NOTE — PROGRESS NOTES
1000 pt very agitated at wound vac, wants it off. Will call Dr Terrence Spears to see pt first when he is on the floor. [facility] pt, so pt can be discharged today. 1140 pt has discharge orders, will be going back to [facility] report called to nghia RN at [facility] pt being discharged by transport. Pt wanted to keep condom cath on but it leaked  So he is now wet with urine. Pt does not have any other pants and does not want to take his wet pants off. Pt requested a urinal to take with him, clean urinal given. nghia at [facility] called to inform.

## 2017-11-27 NOTE — DISCHARGE SUMMARY
Internal Medicine Discharge Summary        Patient: Tiney Gosselin    YOB: 1950    Age:  78 y.o. Admit Date: 11/17/2017    Discharge Date: 11/27/2017    LOS:  LOS: 10 days     Discharge To: SNF    Consults: Vascular Surgery and Podiatry    Admission Diagnoses: Pain of foot due to ischemia when walking ([facility]  foot  Right heel wound    Discharge Diagnoses:    Problem List as of 11/27/2017  Date Reviewed: 11/22/2017          Codes Class Noted - Resolved    * (Principal)Gangrene ([facility] ICD-10-CM: I19  ICD-9-CM: 785.4  11/19/2017 - Present        Pain of foot due to ischemia when walking ([facility] ICD-10-CM: I73.9  ICD-9-CM: 443.9  11/17/2017 - Present        PNA (pneumonia) ICD-10-CM: J18.9  ICD-9-CM: 486  5/15/2016 - Present        DVT (deep venous thrombosis) ([facility] ICD-10-CM: I82.409  ICD-9-CM: 453.40  5/14/2016 - Present        Hypertension (Chronic) ICD-10-CM: I10  ICD-9-CM: 401.9  3/17/2016 - Present        CAD (coronary artery disease) (Chronic) ICD-10-CM: I25.10  ICD-9-CM: 414.00  3/17/2016 - Present        Hyperlipidemia (Chronic) ICD-10-CM: E78.5  ICD-9-CM: 272.4  3/17/2016 - Present        Hip pain (Chronic) ICD-10-CM: M25.559  ICD-9-CM: 719.45  3/17/2016 - Present        Chest pain ICD-10-CM: R07.9  ICD-9-CM: 786.50  3/17/2016 - Present        COPD (chronic obstructive pulmonary disease) (HCC) (Chronic) ICD-10-CM: J44.9  ICD-9-CM: 496  3/17/2016 - Present        Tobacco abuse (Chronic) ICD-10-CM: Z72.0  ICD-9-CM: 305.1  3/17/2016 - Present              Discharge Condition:  Improved    Procedures: I&D and debridement necrotic tissue R foot. Angiography and fempop bypass          Hospital Course (Including Significant Findings): The patient was admitted with Fever, Altered mental status and also TAD. Found to have stage 4 ulcers on R foot and and ulceration top of left foot, ischemia R heal. Cardiology and vascular consulted.   Incidental finding of elevated troponin with normal CK. Podiatry consulted, recommend better vascular supply prior to any foot surgery s/p angiography and fempop bypass 11/21. No wound cultures. S/p  I&D and debridement of necrotic tissue 11/22 Dr Molly Dent. Wound vac placed and will be continued at discharge with follow up wound care center. Discharged on an additional 3 days of augmentin. He was no longer having fevers and had no leukocytosis. Renal function resolved back to his baseline CKD III levels. He was instructed to follow up with vascular surgery in 2-4 weeks. The rest of the patient's chronic conditions were managed appropriately during their admission. They were medically stable at the time of discharge. Visit Vitals    /74 (BP 1 Location: Right arm, BP Patient Position: At rest)    Pulse 81    Temp 98.1 °F (36.7 °C)    Resp 18    Ht 5' 9.5\" (1.765 m)    Wt 77.1 kg (170 lb)    SpO2 93%    BMI 24.74 kg/m2       Physical Exam at Discharge:  General Appearance: NAD, conversant  HENT: normocephalic/atraumatic, moist mucus membranes  Lungs: CTA with normal respiratory effort  CV: RRR, no m/r/g  Abdomen: soft, non-tender, normal bowel sounds  Extremities: no cyanosis, no peripheral edema  Neuro: moves all extremities, no focal deficits  Psych: appropriate affect, alert and oriented to person, place and time    Labs Prior to Discharge:  Labs: Results:       Chemistry Recent Labs      11/26/17   0705  11/25/17   0930   GLU  168*  155*   NA  142  141   K  4.7  4.9   CL  109*  108   CO2  27  26   BUN  31*  30*   CREA  1.46*  1.53*   CA  7.7*  7.8*   AGAP  6  7   BUCR  21*  20      CBC w/Diff Recent Labs      11/26/17   0705  11/25/17   0930   WBC  12.4  11.5   RBC  2.71*  2.85*   HGB  7.8*  8.2*   HCT  24.2*  25.7*   PLT  359  385   GRANS  78*  75*   LYMPH  13*  15*   EOS  5  6*      Cardiac Enzymes No results for input(s): CPK, CKND1, PRETTY in the last 72 hours.     No lab exists for component: CKRMB, TROIP   Coagulation No results for input(s): PTP, INR, APTT in the last 72 hours. No lab exists for component: INREXT    Lipid Panel Lab Results   Component Value Date/Time    Cholesterol, total 178 05/15/2016 03:28 AM    HDL Cholesterol 36 05/15/2016 03:28 AM    LDL, calculated 88 05/15/2016 03:28 AM    Triglyceride 269 05/15/2016 03:28 AM    CHOL/HDL Ratio 4.9 05/15/2016 03:28 AM      BNP No results for input(s): BNPP in the last 72 hours. Liver Enzymes No results for input(s): TP, ALB, TBIL, AP, SGOT, GPT in the last 72 hours. No lab exists for component: DBIL   Thyroid Studies Lab Results   Component Value Date/Time    TSH 0.45 11/18/2017 06:06 AM            Significant Imaging:  Xr Foot Rt Min 3 V    Result Date: 11/17/2017  Examination: Right foot 3 views. HISTORY: Right foot pain while walking. FINDINGS: Dorsal, oblique, lateral views of the right foot are performed and interpreted without comparison. Osseous alignment is as expected on these nonweightbearing projections. No fracture. No retained radiopaque foreign object. Mild right first metatarsophalangeal joint osteoarthritis is present. Prominent os trigonum. Small plantar calcaneal spur. No retained radiopaque foreign object. Atherosclerotic vascular calcifications noted diffusely. IMPRESSION: 1. No acute radiographic abnormality involving the right foot. 2. Mild right first metatarsophalangeal joint osteoarthritis. Xr Chest Port    Result Date: 11/18/2017  Chest, single view Indication: Congestive heart failure. Comparison: Several prior chest radiographs, most recently 11/17/2017 Findings:  Portable upright AP view of the chest was obtained. There is no focal pneumonic consolidation, pneumothorax, or pleural effusion. Mild prominence of central vascular structures is improved from prior examination of 11/6/2017, and similar to prior exam performed yesterday. Cardiac size and mediastinal contours remain stable. There is no acute osseous abnormality.      Impression: No radiographic evidence of an acute abnormality. Xr Chest Port    Result Date: 11/17/2017  EXAM: One-view chest CLINICAL HISTORY: Shortness of Breath , COMPARISON: None FINDINGS: Frontal view of the chest demonstrate clear lungs. Cardiac silhouette is normal in size and contour. No acute bony or soft tissue abnormality. IMPRESSION: No acute pulmonary process identified. Xr Chest Port    Result Date: 11/6/2017  Chest, single view Indication: Coronary artery disease, left-sided chest pain. Comparison: Chest radiograph May 14, 2016 Findings:  Portable upright AP view of the chest was obtained. There is no focal pneumonic opacity, pneumothorax, or pleural effusion. Cardiac size and mediastinal contours are stable. Atherosclerotic calcification of the thoracic aorta noted. No acute osseous abnormality. Impression: No radiographic evidence of an acute abnormality. Stable exam.           Discharge Medications:     Current Discharge Medication List      START taking these medications    Details   amoxicillin-clavulanate (AUGMENTIN) 875-125 mg per tablet Take 1 Tab by mouth every twelve (12) hours for 3 days. Qty: 6 Tab, Refills: 0         CONTINUE these medications which have CHANGED    Details   oxyCODONE IR (ROXICODONE) 10 mg tab immediate release tablet Take 1 Tab by mouth every six (6) hours as needed. Max Daily Amount: 40 mg.  Qty: 30 Tab, Refills: 0         CONTINUE these medications which have NOT CHANGED    Details   atorvastatin (LIPITOR) 80 mg tablet Take 80 mg by mouth daily. bacitracin zinc (BACITRACIN) ointment Apply  to affected area two (2) times a day. fluticasone-vilanterol (BREO ELLIPTA) 200-25 mcg/dose inhaler Take 1 Puff by inhalation daily. ammonium lactate (LAC-HYDRIN) 12 % lotion Apply  to affected area as needed. rub in to affected area well      escitalopram oxalate (LEXAPRO) 10 mg tablet Take 10 mg by mouth daily. melatonin 3 mg tablet Take 3 mg by mouth. magnesium hydroxide (BROWN MILK OF MAGNESIA) 400 mg/5 mL suspension Take 30 mL by mouth daily as needed for Constipation. guaiFENesin ER (MUCINEX) 600 mg ER tablet Take 600 mg by mouth two (2) times a day.      naloxone (NARCAN) 4 mg/actuation nasal spray 1 [place] by IntraNASal route once as needed. Use 1 spray intranasally into 1 nostril. Use a new Narcan nasal spray for subsequent doses and administer into alternating nostrils. May repeat every 2 to 3 minutes as needed. gabapentin (NEURONTIN) 400 mg capsule Take 400 mg by mouth three (3) times daily. nitroglycerin (NITROSTAT) 0.4 mg SL tablet 0.4 mg by SubLINGual route every five (5) minutes as needed for Chest Pain. amLODIPine (NORVASC) 10 mg tablet Take 10 mg by mouth daily. insulin NPH/insulin regular (NOVOLIN 70/30) 100 unit/mL (70-30) injection 10 Units by SubCUTAneous route two (2) times a day. insulin aspart protamine/insulin aspart (NOVOLOG MIX 70-30 FLEXPEN) 100 unit/mL (70-30) inpn by SubCUTAneous route. nystatin (MYCOSTATIN) 100,000 unit/gram ointment Apply  to affected area two (2) times a day. clopidogrel (PLAVIX) 75 mg tab Take 75 mg by mouth. finasteride (PROSCAR) 5 mg tablet Take 5 mg by mouth daily. zinc oxide (SECURA PROTECTIVE, ZINC OXIDE,) 10 % topical cream Apply  to affected area. senna (SENNA) 8.6 mg tablet Take 1 Tab by mouth daily. sodium bicarbonate 650 mg tablet Take 650 mg by mouth four (4) times daily. OMEGA-3 FATTY ACIDS/FISH OIL (THERAGRAN-M PO) Take  by mouth. tiotropium (SPIRIVA) 18 mcg inhalation capsule Take 1 Cap by inhalation daily. linagliptin (TRADJENTA) 5 mg tablet Take 5 mg by mouth daily. traZODone (DESYREL) 50 mg tablet Take 50 mg by mouth nightly. cholecalciferol (VITAMIN D3) 1,000 unit cap Take  by mouth daily. ondansetron (ZOFRAN ODT) 8 mg disintegrating tablet Take 8 mg by mouth every eight (8) hours as needed for Nausea. acetaminophen (TYLENOL) 325 mg tablet Take  by mouth every four (4) hours as needed for Pain. oxymetazoline (AFRIN, OXYMETAZOLINE,) 0.05 % nasal spray 2 Sprays two (2) times a day. diphenhydrAMINE-zinc acetate 2%-0.1% (BENADRYL EXTRA STRENGTH) 2-0.1 % topical cream Apply  to affected area two (2) times daily as needed for Itching. DULoxetine (CYMBALTA) 60 mg capsule Take 60 mg by mouth daily. apixaban (ELIQUIS) 5 mg tablet Take 2.5 mg by mouth two (2) times a day. tamsulosin (FLOMAX) 0.4 mg capsule Take 0.4 mg by mouth daily. furosemide (LASIX) 20 mg tablet Take  by mouth daily. losartan (COZAAR) 100 mg tablet Take 100 mg by mouth daily. mirabegron ER (MYRBETRIQ) 25 mg ER tablet Take 1 Tab by mouth daily. Qty: 30 Tab, Refills: 6      aspirin delayed-release 81 mg tablet Take 1 Tab by mouth daily. Qty: 30 Tab, Refills: 1      ipratropium-albuterol (COMBIVENT RESPIMAT)  mcg/actuation inhaler Take 1 Puff by inhalation two (2) times a day. Qty: 1 Inhaler, Refills: 1      insulin glargine (LANTUS) 100 unit/mL injection by SubCUTAneous route nightly. Per scale      insulin lispro (HUMALOG) 100 unit/mL injection by SubCUTAneous route once. STOP taking these medications       fentaNYL (DURAGESIC) 12 mcg/hr patch Comments:   Reason for Stopping:               Activity: PT/OT Eval and Treat    Diet: Cardiac Diet    Wound Care: As directed    Follow-up:   Please follow up with your PCP within 7 days to discuss your recent hospitalization. Patient to arrange.          Total time spent including time spent on final examination and discharge discussion, discharge documentation and records reviewed and medication reconciliation: > 30 minutes    Tiny Benton DO  Internal Medicine, Hospitalist  Pager: [phone]

## 2021-03-22 NOTE — PROGRESS NOTES
----- Message from Claudia Moreno RN sent at 3/15/2021 12:31 PM CDT -----  CBC in 1 week and start ibrance if ANC >1000     Component      Latest Ref Rng & Units 3/15/2021 3/22/2021   Absolute Neutrophil      1.8 - 7.7 K/mcL 0.9 (L) 1.5 (L)     Writer called pt and instructed her to start her next ibrance cycle. She verbalized understanding.    Physical Therapy Screening:  Services are indicated and therapy order is required. Needs weightbearing clarification     An InBasket screening referral was triggered for physical therapy based on results obtained during the nursing admission assessment. The patients chart was reviewed and the patient is appropriate for a skilled therapy evaluation. Please order a consult for physical therapy if you are in agreement and would like an evaluation to be completed. Thank you.     Nish Key, PT

## 2024-12-02 NOTE — ED NOTES
Patient pushing himself in the wheel chair, states he wants to wait outside in the sun for transpotrt, explained to the patient that he needs to be in his bed for med transport to take him home.  Security called as patient insists he can go outside, patient taken to the back vang in a sheel chair to wait for transport [place]

## (undated) DEVICE — SPONGE LAP 18X18IN STRL -- 5/PK

## (undated) DEVICE — BNDG CMPR ELAS KNT VEL STD 4IN -- MEDICHOICE

## (undated) DEVICE — SOFT TISSUE TIP

## (undated) DEVICE — SOLUTION IV 1000ML 0.9% SOD CHL

## (undated) DEVICE — KENDALL SCD EXPRESS SLEEVES, KNEE LENGTH, MEDIUM: Brand: KENDALL SCD

## (undated) DEVICE — SYR 10ML CTRL LR LCK NSAF LF --

## (undated) DEVICE — KIT PROC EXTRM HND FT CUST LF --

## (undated) DEVICE — INTENDED FOR TISSUE SEPARATION, AND OTHER PROCEDURES THAT REQUIRE A SHARP SURGICAL BLADE TO PUNCTURE OR CUT.: Brand: BARD-PARKER ® CARBON RIB-BACK BLADES

## (undated) DEVICE — DRSG VAC ASST CLSR GRNUFM MED -- 18X12.5X3.2CM

## (undated) DEVICE — DRSG VAC ASST CLSR GRNUFM SM --

## (undated) DEVICE — BANDAGE ROLL,100% COTTON, 8 PLY, LARGE: Brand: KERLIX

## (undated) DEVICE — REM POLYHESIVE ADULT PATIENT RETURN ELECTRODE: Brand: VALLEYLAB

## (undated) DEVICE — OCCLUSIVE GAUZE STRIP,3% BISMUTH TRIBROMOPHENATE IN PETROLATUM BLEND: Brand: XEROFORM

## (undated) DEVICE — STOCKINETTE IMPERV REG 9X48IN --

## (undated) DEVICE — X-RAY DETECTABLE SPONGES,12 PLY: Brand: VISTEC

## (undated) DEVICE — SOLUTION SCRB 4OZ 10% PVP I POVIDONE IOD TOP PAINT EXIDINE

## (undated) DEVICE — NEEDLE HYPO 25GA L1.5IN BLU POLYPR HUB S STL REG BVL STR

## (undated) DEVICE — CANISTER VAC 500MLW/O GEL 5PK -- ORDER AS CA

## (undated) DEVICE — CULTURETTE SGL EVAC TUBE PALL -- 100/CA